# Patient Record
Sex: FEMALE | HISPANIC OR LATINO | Employment: FULL TIME | URBAN - METROPOLITAN AREA
[De-identification: names, ages, dates, MRNs, and addresses within clinical notes are randomized per-mention and may not be internally consistent; named-entity substitution may affect disease eponyms.]

---

## 2017-04-14 ENCOUNTER — OFFICE VISIT (OUTPATIENT)
Dept: URGENT CARE | Facility: PHYSICIAN GROUP | Age: 22
End: 2017-04-14
Payer: COMMERCIAL

## 2017-04-14 VITALS
DIASTOLIC BLOOD PRESSURE: 74 MMHG | TEMPERATURE: 98.5 F | WEIGHT: 210 LBS | HEART RATE: 88 BPM | BODY MASS INDEX: 30.06 KG/M2 | OXYGEN SATURATION: 96 % | RESPIRATION RATE: 18 BRPM | HEIGHT: 70 IN | SYSTOLIC BLOOD PRESSURE: 116 MMHG

## 2017-04-14 DIAGNOSIS — L29.9 PRURITUS: ICD-10-CM
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
## 2017-04-14 PROCEDURE — 99214 OFFICE O/P EST MOD 30 MIN: CPT | Performed by: PHYSICIAN ASSISTANT

## 2017-04-14 RX ORDER — HYDROXYZINE HYDROCHLORIDE 25 MG/1
25 TABLET, FILM COATED ORAL 3 TIMES DAILY PRN
Qty: 30 TAB | Refills: 0 | Status: SHIPPED | OUTPATIENT
Start: 2017-04-14 | End: 2017-05-22

## 2017-04-14 RX ORDER — METHYLPREDNISOLONE 4 MG/1
TABLET ORAL
Qty: 1 KIT | Refills: 0 | Status: SHIPPED | OUTPATIENT
Start: 2017-04-14 | End: 2017-05-22

## 2017-04-14 RX ORDER — TRIAMCINOLONE ACETONIDE 1 MG/ML
LOTION TOPICAL
Qty: 1 BOTTLE | Refills: 1 | Status: SHIPPED | OUTPATIENT
Start: 2017-04-14 | End: 2017-05-22

## 2017-04-14 NOTE — MR AVS SNAPSHOT
"        Mercy Almeida   2017 3:30 PM   Office Visit   MRN: [id number]    Department:  [facility]   Dept Phone:  [phone]    Description:  Female : 1995   Provider:  Lisa Terry PA-C           Reason for Visit     Insect Bite poss bed bugs, feet swelling      Allergies as of 2017     No Known Allergies      You were diagnosed with     Pruritus   [[id number]]         Vital Signs     Blood Pressure Pulse Temperature Respirations Height Weight    116/74 mmHg 88 36.9 °C (98.5 °F) 18 1.778 m (5' 10\") 95.255 kg (210 lb)    Body Mass Index Oxygen Saturation Smoking Status             30.13 kg/m2 96% Never Smoker          Basic Information     Date Of Birth Sex Race Ethnicity Preferred Language    1995 Female  or   Origin (Vincentian,Rwandan,Nigerian,John, etc) English      Health Maintenance        Date Due Completion Dates    IMM HEP A VACCINE (1 of 2 - Standard Series) 1995 ---    IMM HPV VACCINE (1 of 3 - Female 3 Dose Series) 2005 ---    IMM VARICELLA (CHICKENPOX) VACCINE (1 of 2 - 2 Dose Adolescent Series) 2007 ---    IMM DTaP/Tdap/Td Vaccine (1 - Tdap) 2013 ---    PAP SMEAR 2015 ---    IMM HEP B VACCINE (2 of 3 - Primary Series) 2015            Current Immunizations     Hepatitis B Vaccine Recombivax (Adol/Adult) 2015    Influenza Vaccine Quad Inj (Preserved) 10/7/2015    MMR Vaccine 10/7/2015      Below and/or attached are the medications your provider expects you to take. Review all of your home medications and newly ordered medications with your provider and/or pharmacist. Follow medication instructions as directed by your provider and/or pharmacist. Please keep your medication list with you and share with your provider. Update the information when medications are discontinued, doses are changed, or new medications (including over-the-counter products) are added; and carry medication information at " all times in the event of emergency situations     Allergies:  No Known Allergies          Medications  Valid as of: April 14, 2017 -  4:37 PM    Generic Name Brand Name Tablet Size Instructions for use    Amoxicillin (Cap) AMOXIL 500 MG Take 1 Cap by mouth 3 times a day.        Azithromycin (Tab) ZITHROMAX 250 MG Take 2 tabs by mouth on day 1, then take 1 tab on days 2-5        Cholecalciferol (Tab) cholecalciferol 1000 UNIT Take 1,000 Units by mouth every day.        Clindamycin Phosphate (Gel) CLEOCIN T 1 % Apply a small amount twice daily.        HydrOXYzine HCl (Tab) ATARAX 25 MG Take 1 Tab by mouth 3 times a day as needed for Itching.        Ibuprofen (Cap) Ibuprofen 200 MG Take  by mouth.        MethylPREDNISolone (Tablet Therapy Pack) MEDROL DOSEPAK 4 MG Take as directed        MetroNIDAZOLE (Tab) FLAGYL 500 MG Take 500 mg by mouth 3 times a day.        Triamcinolone Acetonide (Lotion) KENALOG 0.1 % Apply thin film to affected areas twice daily until resolved        Vitamin E (Cap) VITAMIN E 1000 UNIT Take 5 Caps by mouth every day.        .                 Medicines prescribed today were sent to:     [facility] [address] [place] [address] - [address] [place] [address] AT [facility] & [address] [address]    Phone: [phone] Fax: [phone]    Open 24 Hours?: No      Medication refill instructions:       If your prescription bottle indicates you have medication refills left, it is not necessary to call your provider’s office. Please contact your pharmacy and they will refill your medication.    If your prescription bottle indicates you do not have any refills left, you may request refills at any time through one of the following ways: The online [facility] system (except Urgent Care), by calling your provider’s office, or by asking your pharmacy to contact your provider’s office with a refill request. Medication refills are processed only during regular business hours and may not be  available until the next business day. Your provider may request additional information or to have a follow-up visit with you prior to refilling your medication.   *Please Note: Medication refills are assigned a new Rx number when refilled electronically. Your pharmacy may indicate that no refills were authorized even though a new prescription for the same medication is available at the pharmacy. Please request the medicine by name with the pharmacy before contacting your provider for a refill.           [employer] [facility] Access Code: Activation code not generated  Current [facility] Status: Active

## 2017-04-18 ASSESSMENT — ENCOUNTER SYMPTOMS
MUSCULOSKELETAL NEGATIVE: 1
FEVER: 0
DIZZINESS: 0
HEADACHES: 0
VOMITING: 0
SHORTNESS OF BREATH: 0
NAUSEA: 0
CHILLS: 0

## 2017-04-18 NOTE — PROGRESS NOTES
"Subjective:      Mercy Almeida is a 21 y.o. female who presents with Insect Bite            HPI   Patient presents to urgent care concerned about bites on her body that she believes are due to bed bugs. She states her and her mother recently took a trip to [place], and she started developing small bumps all over her body after the first night staying in a new hotel. She denies any previous similar episode like this. States the areas are extremely itchy. Denies any history of skin problems. Her mother does not have similar symptoms, and she does report sleeping in the same bed as her. They just returned home from their trip yesterday and she did notice a bug in her home yesterday.     Review of Systems   Constitutional: Negative for fever and chills.   Respiratory: Negative for shortness of breath.    Cardiovascular: Negative for chest pain.   Gastrointestinal: Negative for nausea and vomiting.   Genitourinary: Negative.    Musculoskeletal: Negative.    Skin: Positive for itching and rash.   Neurological: Negative for dizziness and headaches.          Objective:     /74 mmHg  Pulse 88  Temp(Src) 36.9 °C (98.5 °F)  Resp 18  Ht 1.778 m (5' 10\")  Wt 95.255 kg (210 lb)  BMI 30.13 kg/m2  SpO2 96%     Physical Exam   Constitutional: She is oriented to person, place, and time. She appears well-developed and well-nourished. No distress.   HENT:   Head: Normocephalic and atraumatic.   Eyes: Pupils are equal, round, and reactive to light.   Neck: Normal range of motion.   Cardiovascular: Normal rate.    Pulmonary/Chest: Effort normal.   Musculoskeletal: Normal range of motion.   Neurological: She is alert and oriented to person, place, and time.   Skin: Skin is warm and dry. Rash noted. She is not diaphoretic.        Multiple small, punctate lesions that have scabbed over with large areas of surrounding erythema and edema consistent with urticaria.    Psychiatric: She has a normal mood and affect. Her " behavior is normal.   Nursing note and vitals reviewed.         PMH:  has no past medical history on file.  MEDS:   Current outpatient prescriptions:   •  MethylPREDNISolone (MEDROL DOSEPAK) 4 MG Tablet Therapy Pack, Take as directed, Disp: 1 Kit, Rfl: 0  •  triamcinolone (KENALOG) 0.1 % lotion, Apply thin film to affected areas twice daily until resolved, Disp: 1 Bottle, Rfl: 1  •  hydrOXYzine (ATARAX) 25 MG Tab, Take 1 Tab by mouth 3 times a day as needed for Itching., Disp: 30 Tab, Rfl: 0  •  vitamin D (CHOLECALCIFEROL) 1000 UNIT Tab, Take 1,000 Units by mouth every day., Disp: , Rfl:   •  amoxicillin (AMOXIL) 500 MG Cap, Take 1 Cap by mouth 3 times a day., Disp: 30 Cap, Rfl: 0  •  vitamin E (VITAMIN E) 1000 UNIT Cap, Take 5 Caps by mouth every day., Disp: , Rfl:   •  azithromycin (ZITHROMAX) 250 MG Tab, Take 2 tabs by mouth on day 1, then take 1 tab on days 2-5, Disp: 6 Tab, Rfl: 0  •  metronidazole (FLAGYL) 500 MG TABS, Take 500 mg by mouth 3 times a day., Disp: , Rfl:   •  clindamycin (CLEOCIN T) 1 % GEL, Apply a small amount twice daily., Disp: 1 Tube, Rfl: 0  •  Ibuprofen (ADVIL) 200 MG CAPS, Take  by mouth., Disp: , Rfl:   ALLERGIES: No Known Allergies  SURGHX: History reviewed. No pertinent past surgical history.  SOCHX:  reports that she has never smoked. She does not have any smokeless tobacco history on file. She reports that she does not drink alcohol or use illicit drugs.  FH: family history is not on file.       Assessment/Plan:     1. Multiple insect bites  - MethylPREDNISolone (MEDROL DOSEPAK) 4 MG Tablet Therapy Pack; Take as directed  Dispense: 1 Kit; Refill: 0  - triamcinolone (KENALOG) 0.1 % lotion; Apply thin film to affected areas twice daily until resolved  Dispense: 1 Bottle; Refill: 1    2. Pruritus  - hydrOXYzine (ATARAX) 25 MG Tab; Take 1 Tab by mouth 3 times a day as needed for Itching.  Dispense: 30 Tab; Refill: 0   - Will cause sedation, avoid driving, operating heavy machinery, and  drinking alcohol    Advised patient her lesions resemble bed bug bites. Will treat with medrol dose pack due to large areas or urticarial rashes underlying lesions. Advised to spot treat with triamcinolone cream twice daily until resolved. Atarax given for pruritis. Patient advised that she wash all clothing and bedding at her house in very hot water and contact a  for inspection of her home. Call or return to office if symptoms of infection develop.

## 2017-05-22 ENCOUNTER — OFFICE VISIT (OUTPATIENT)
Dept: URGENT CARE | Facility: CLINIC | Age: 22
End: 2017-05-22
Payer: COMMERCIAL

## 2017-05-22 VITALS
SYSTOLIC BLOOD PRESSURE: 118 MMHG | BODY MASS INDEX: 30.49 KG/M2 | RESPIRATION RATE: 16 BRPM | WEIGHT: 213 LBS | DIASTOLIC BLOOD PRESSURE: 78 MMHG | TEMPERATURE: 98.4 F | OXYGEN SATURATION: 96 % | HEIGHT: 70 IN | HEART RATE: 81 BPM

## 2017-05-22 DIAGNOSIS — H10.33 ACUTE BACTERIAL CONJUNCTIVITIS OF BOTH EYES: ICD-10-CM

## 2017-05-22 DIAGNOSIS — J01.90 ACUTE BACTERIAL SINUSITIS: ICD-10-CM

## 2017-05-22 DIAGNOSIS — J02.9 ACUTE PHARYNGITIS, UNSPECIFIED ETIOLOGY: ICD-10-CM

## 2017-05-22 DIAGNOSIS — B96.89 ACUTE BACTERIAL SINUSITIS: ICD-10-CM

## 2017-05-22 LAB
INT CON NEG: NEGATIVE
INT CON POS: POSITIVE
S PYO AG THROAT QL: NEGATIVE

## 2017-05-22 PROCEDURE — 99214 OFFICE O/P EST MOD 30 MIN: CPT | Performed by: FAMILY MEDICINE

## 2017-05-22 PROCEDURE — 87880 STREP A ASSAY W/OPTIC: CPT | Performed by: FAMILY MEDICINE

## 2017-05-22 RX ORDER — AMOXICILLIN 875 MG/1
TABLET, COATED ORAL
Qty: 20 TAB | Refills: 0 | Status: SHIPPED | OUTPATIENT
Start: 2017-05-22 | End: 2018-01-23

## 2017-05-22 RX ORDER — OFLOXACIN 3 MG/ML
SOLUTION/ DROPS OPHTHALMIC
Qty: 5 ML | Refills: 1 | Status: CANCELLED | OUTPATIENT
Start: 2017-05-22

## 2017-05-22 RX ORDER — OFLOXACIN 3 MG/ML
SOLUTION/ DROPS OPHTHALMIC
Qty: 5 ML | Refills: 1 | Status: SHIPPED | OUTPATIENT
Start: 2017-05-22 | End: 2017-05-30

## 2017-05-22 NOTE — PROGRESS NOTES
Chief Complaint:    Chief Complaint   Patient presents with   • Eye Problem     x since yesterday having red, itchy, L eye, R eye started today, heavy discharge    • Nasal Congestion     x started having nasal congestion and drainage, dry cough, irritated throat       History of Present Illness:    This is a new problem. Started with sore throat on 5/20/17. On 5/21/17, started with both eyes red and with purulent discharge, nasal congestion, and cough (mainly when lays down - thinks may be due to post-nasal drainage). No fever. No contact lens use, foreign body to eye, or significant change in vision.      Review of Systems:    Constitutional: Negative for fever, chills, and diaphoresis.   Eyes: See HPI.  ENT: See HPI..    Respiratory: See HPI.  Cardiovascular: Negative for chest pain, palpitations, orthopnea, claudication, leg swelling, and PND.   Gastrointestinal: Negative for abdominal pain, nausea, vomiting, diarrhea, constipation, blood in stool, and melena.   Genitourinary: Negative for dysuria, urinary urgency, urinary frequency, hematuria, and flank pain.   Musculoskeletal: Negative for myalgias, joint pain, neck pain, and back pain.   Skin: Negative for rash and itching.   Neurological: Negative for dizziness, tingling, tremors, sensory change, speech change, focal weakness, seizures, loss of consciousness, and headaches.   Endo: Negative for polydipsia.   Heme: Does not bruise/bleed easily.   Psychiatric/Behavioral: Negative for depression, suicidal ideas, hallucinations, memory loss and substance abuse. The patient is not nervous/anxious and does not have insomnia.      Past Medical History:    History reviewed. No pertinent past medical history.    Past Surgical History:    History reviewed. No pertinent past surgical history.    Social History:    Social History     Social History   • Marital Status: Single     Spouse Name: N/A   • Number of Children: N/A   • Years of Education: N/A     Occupational  "History   • Not on file.     Social History Main Topics   • Smoking status: Never Smoker    • Smokeless tobacco: Not on file   • Alcohol Use: No   • Drug Use: No   • Sexual Activity: Not on file     Other Topics Concern   • Not on file     Social History Narrative       Family History:    History reviewed. No pertinent family history.    Medications:    No current outpatient prescriptions on file prior to visit.     No current facility-administered medications on file prior to visit.       Allergies:    No Known Allergies      Vitals:    Filed Vitals:    05/22/17 0850   BP: 118/78   Pulse: 81   Temp: 36.9 °C (98.4 °F)   Resp: 16   Height: 1.778 m (5' 10\")   Weight: 96.616 kg (213 lb)   SpO2: 96%       Physical Exam:    Constitutional: Vital signs reviewed. Appears well-developed and well-nourished. No acute distress.   Eyes: Bilateral conjunctivae are moderate-severely injected with dried purulent discharge at medial canthi bilaterally. PERRLA.  ENT: Bilateral nasal congestion and erythematous nasal mucosa. External ears normal. Hearing normal. Nasal mucosa pink. Lips/teeth are normal. Oral mucosa pink and moist. Posterior pharynx and tonsils are moderately erythematous without exudate.  Neck: Neck supple.   Cardiovascular: Regular rate and rhythm. No murmur.  Pulmonary/Chest: Respirations non-labored. Clear to auscultation bilaterally.  Lymph: Cervical nodes without tenderness or enlargement.  Musculoskeletal: Normal gait. Normal range of motion. No muscular atrophy or weakness.  Neurological: Alert and oriented to person, place, and time. Muscle tone normal. Coordination normal.   Skin: No rashes or lesions. Warm, dry, normal turgor.  Psychiatric: Normal mood and affect. Behavior is normal. Judgment and thought content normal.     Diagnostics:     POCT RAPID STREP A (Order #[id number]) on 5/22/17       Component Results      Component     Rapid Strep Screen     Negative     Internal Control Positive     Positive "     Internal Control Negative     Negative         Last Resulted Time     Mon May 22, 2017  9:58 AM       Assessment / Plan:    1. Acute bacterial conjunctivitis of both eyes  - ofloxacin (OCUFLOX) 0.3 % Solution; 1-2 DROPS TO EYES EVERY 2-4 HOURS WHILE AWAKE. USE FOR ONE EXTRA DAY AFTER BETTER.  Dispense: 5 mL; Refill: 1    2. Acute pharyngitis, unspecified etiology  - POCT Rapid Strep A  - amoxicillin (AMOXIL) 875 MG tablet; 1 TAB TWICE A DAY X 10 DAYS.  Dispense: 20 Tab; Refill: 0    3. Acute bacterial sinusitis  - amoxicillin (AMOXIL) 875 MG tablet; 1 TAB TWICE A DAY X 10 DAYS.  Dispense: 20 Tab; Refill: 0      Work note given - excuse for 5/22 thru and including 5/24/17. May return sooner if better.    Discussed with her limitations of Rapid Strep test (possible false negative, only testing for Strep A), DDX, and management options.    May use OTC meds for symptoms prn.    Agreeable to medications prescribed.    Declines Throat culture.    Follow-up with PCP or urgent care if getting worse or not better with above.

## 2017-05-22 NOTE — MR AVS SNAPSHOT
"        Mercy Almeida   2017 8:45 AM   Office Visit   MRN: [id number]    Department:  [facility]   Dept Phone:  [phone]    Description:  Female : 1995   Provider:  Betito Subramanian M.D.           Reason for Visit     Eye Problem x since yesterday having red, itchy, L eye, R eye started today, heavy discharge     Nasal Congestion x started having nasal congestion and drainage, dry cough, irritated throat      Allergies as of 2017     No Known Allergies      You were diagnosed with     Acute bacterial conjunctivitis of both eyes   [[id number]]       Acute pharyngitis, unspecified etiology   [[id number]]       Acute bacterial sinusitis   [[id number]]         Vital Signs     Blood Pressure Pulse Temperature Respirations Height Weight    118/78 mmHg 81 36.9 °C (98.4 °F) 16 1.778 m (5' 10\") 96.616 kg (213 lb)    Body Mass Index Oxygen Saturation Smoking Status             30.56 kg/m2 96% Never Smoker          Basic Information     Date Of Birth Sex Race Ethnicity Preferred Language    1995 Female  or   Origin (Northern Irish,Mosotho,Syrian,South African, etc) English      Health Maintenance        Date Due Completion Dates    IMM HEP A VACCINE (1 of 2 - Standard Series) 1995 ---    IMM HPV VACCINE (1 of 3 - Female 3 Dose Series) 2005 ---    IMM VARICELLA (CHICKENPOX) VACCINE (1 of 2 - 2 Dose Adolescent Series) 2007 ---    IMM DTaP/Tdap/Td Vaccine (1 - Tdap) 2013 ---    PAP SMEAR 2015 ---    IMM HEP B VACCINE (2 of 3 - Primary Series) 2015            Current Immunizations     Hepatitis B Vaccine Recombivax (Adol/Adult) 2015    Influenza Vaccine Quad Inj (Preserved) 10/7/2015    MMR Vaccine 10/7/2015      Below and/or attached are the medications your provider expects you to take. Review all of your home medications and newly ordered medications with your provider and/or pharmacist. Follow medication instructions as directed by " your provider and/or pharmacist. Please keep your medication list with you and share with your provider. Update the information when medications are discontinued, doses are changed, or new medications (including over-the-counter products) are added; and carry medication information at all times in the event of emergency situations     Allergies:  No Known Allergies          Medications  Valid as of: May 22, 2017 -  9:44 AM    Generic Name Brand Name Tablet Size Instructions for use    Amoxicillin (Tab) AMOXIL 875 MG 1 TAB TWICE A DAY X 10 DAYS.        Ofloxacin (Solution) OCUFLOX 0.3 % 1-2 DROPS TO EYES EVERY 2-4 HOURS WHILE AWAKE. USE FOR ONE EXTRA DAY AFTER BETTER.        .                 Medicines prescribed today were sent to:     [facility] [address] [place] [address], [place] - [address] [facility] AT [facility] & [place] [id number] [facility] [address]    Phone: [phone] Fax: [phone]    Open 24 Hours?: No      Medication refill instructions:       If your prescription bottle indicates you have medication refills left, it is not necessary to call your provider’s office. Please contact your pharmacy and they will refill your medication.    If your prescription bottle indicates you do not have any refills left, you may request refills at any time through one of the following ways: The online [employer] system (except Urgent Care), by calling your provider’s office, or by asking your pharmacy to contact your provider’s office with a refill request. Medication refills are processed only during regular business hours and may not be available until the next business day. Your provider may request additional information or to have a follow-up visit with you prior to refilling your medication.   *Please Note: Medication refills are assigned a new Rx number when refilled electronically. Your pharmacy may indicate that no refills were authorized even though a new prescription for the same medication is available  at the pharmacy. Please request the medicine by name with the pharmacy before contacting your provider for a refill.           MyChart Access Code: Activation code not generated  Current MyChart Status: Active

## 2017-05-22 NOTE — Clinical Note
May 22, 2017         Patient: Mercy Almeida   YOB: 1995   Date of Visit: 5/22/2017           To Whom it May Concern:    Mercy Almeida was seen in my clinic on 5/22/2017.     Please excuse from work for 5/22 thru and including 5/24/17 due to medical condition.    May return sooner if better.    If you have any questions or concerns, please don't hesitate to call.        Sincerely,           Betito Subramanian M.D.  Electronically Signed

## 2017-05-30 ENCOUNTER — OFFICE VISIT (OUTPATIENT)
Dept: URGENT CARE | Facility: PHYSICIAN GROUP | Age: 22
End: 2017-05-30
Payer: COMMERCIAL

## 2017-05-30 VITALS
SYSTOLIC BLOOD PRESSURE: 122 MMHG | TEMPERATURE: 97.9 F | DIASTOLIC BLOOD PRESSURE: 70 MMHG | OXYGEN SATURATION: 98 % | BODY MASS INDEX: 28.84 KG/M2 | RESPIRATION RATE: 16 BRPM | WEIGHT: 201 LBS | HEART RATE: 82 BPM

## 2017-05-30 DIAGNOSIS — H10.33 ACUTE BACTERIAL CONJUNCTIVITIS OF BOTH EYES: ICD-10-CM

## 2017-05-30 PROCEDURE — 99214 OFFICE O/P EST MOD 30 MIN: CPT | Performed by: FAMILY MEDICINE

## 2017-05-30 NOTE — Clinical Note
May 30, 2017         Patient: Mercy Almeida   YOB: 1995   Date of Visit: 5/30/2017           To Whom it May Concern:    Mrecy Almeida was seen in my clinic on 5/30/2017. She may return to work on 5/31/2017..    If you have any questions or concerns, please don't hesitate to call.        Sincerely,           Sky Murillo M.D.  Electronically Signed

## 2017-05-30 NOTE — PROGRESS NOTES
Subjective:      Mercy Almeida is a 22 y.o. female who presents with Conjunctivitis            Conjunctivitis  This is a new problem. The current episode started in the past 7 days. The problem occurs constantly. The problem has been rapidly worsening. Pertinent negatives include no chills, fever, headaches or visual change.       Review of Systems   Constitutional: Negative for fever and chills.   Neurological: Negative for headaches.     No Known Allergies      Objective:     /70 mmHg  Pulse 82  Temp(Src) 36.6 °C (97.9 °F)  Resp 16  Wt 91.173 kg (201 lb)  SpO2 98%     Physical Exam   Constitutional: She is oriented to person, place, and time. She appears well-developed and well-nourished. No distress.   HENT:   Head: Normocephalic and atraumatic.   Eyes: Conjunctivae and EOM are normal. Pupils are equal, round, and reactive to light. Right eye exhibits discharge. Left eye exhibits discharge. Right conjunctiva has no hemorrhage. Left conjunctiva has no hemorrhage.   Cardiovascular: Normal rate and regular rhythm.    No murmur heard.  Pulmonary/Chest: Effort normal and breath sounds normal. No respiratory distress.   Abdominal: Soft. She exhibits no distension. There is no tenderness.   Neurological: She is alert and oriented to person, place, and time. She has normal reflexes. No sensory deficit.   Skin: Skin is warm and dry.   Psychiatric: She has a normal mood and affect.               Assessment/Plan:     1. Acute bacterial conjunctivitis of both eyes  Differential diagnosis, natural history, supportive care, and indications for immediate follow-up discussed.   - azithromycin (AZASITE) 1 % ophthalmic solution; Place 1 Drop in both eyes every day.  Dispense: 2.5 mL; Refill: 0

## 2017-05-30 NOTE — PATIENT INSTRUCTIONS
"Conjunctivitis  Conjunctivitis is commonly called \"pink eye.\" Conjunctivitis can be caused by bacterial or viral infection, allergies, or injuries. There is usually redness of the lining of the eye, itching, discomfort, and sometimes discharge. There may be deposits of matter along the eyelids. A viral infection usually causes a watery discharge, while a bacterial infection causes a yellowish, thick discharge. Pink eye is very contagious and spreads by direct contact.  You may be given antibiotic eyedrops as part of your treatment. Before using your eye medicine, remove all drainage from the eye by washing gently with warm water and cotton balls. Continue to use the medication until you have awakened 2 mornings in a row without discharge from the eye. Do not rub your eye. This increases the irritation and helps spread infection. Use separate towels from other household members. Wash your hands with soap and water before and after touching your eyes. Use cold compresses to reduce pain and sunglasses to relieve irritation from light. Do not wear contact lenses or wear eye makeup until the infection is gone.  SEEK MEDICAL CARE IF:   · Your symptoms are not better after 3 days of treatment.  · You have increased pain or trouble seeing.  · The outer eyelids become very red or swollen.  Document Released: 01/25/2006 Document Revised: 03/11/2013 Document Reviewed: 12/18/2006  [email or web address]® Patient Information ©2014 [employer].    "

## 2017-05-30 NOTE — MR AVS SNAPSHOT
Mercy Almeida   2017 10:45 AM   Office Visit   MRN: [id number]    Department:  [facility]   Dept Phone:  [phone]    Description:  Female : 1995   Provider:  Sky Murillo M.D.           Reason for Visit     Conjunctivitis dx pink eye last wk, needs more meds due to loss of previous rx      Allergies as of 2017     No Known Allergies      You were diagnosed with     Acute bacterial conjunctivitis of both eyes   [[id number]]         Vital Signs     Blood Pressure Pulse Temperature Respirations Weight Oxygen Saturation    122/70 mmHg 82 36.6 °C (97.9 °F) 16 91.173 kg (201 lb) 98%    Smoking Status                   Never Smoker            Basic Information     Date Of Birth Sex Race Ethnicity Preferred Language    1995 Female  or   Origin (Icelandic,Slovenian,Singaporean,Serbian, etc) English      Health Maintenance        Date Due Completion Dates    IMM HEP A VACCINE (1 of 2 - Standard Series) 1995 ---    IMM HPV VACCINE (1 of 3 - Female 3 Dose Series) 2005 ---    IMM VARICELLA (CHICKENPOX) VACCINE (1 of 2 - 2 Dose Adolescent Series) 2007 ---    IMM DTaP/Tdap/Td Vaccine (1 - Tdap) 2013 ---    PAP SMEAR 2015 ---    IMM HEP B VACCINE (2 of 3 - Primary Series) 2015            Current Immunizations     Hepatitis B Vaccine Recombivax (Adol/Adult) 2015    Influenza Vaccine Quad Inj (Preserved) 10/7/2015    MMR Vaccine 10/7/2015      Below and/or attached are the medications your provider expects you to take. Review all of your home medications and newly ordered medications with your provider and/or pharmacist. Follow medication instructions as directed by your provider and/or pharmacist. Please keep your medication list with you and share with your provider. Update the information when medications are discontinued, doses are changed, or new medications (including over-the-counter products) are added; and  "carry medication information at all times in the event of emergency situations     Allergies:  No Known Allergies          Medications  Valid as of: May 30, 2017 - 12:03 PM    Generic Name Brand Name Tablet Size Instructions for use    Amoxicillin (Tab) AMOXIL 875 MG 1 TAB TWICE A DAY X 10 DAYS.        Azithromycin (Solution) AZASITE 1 % Place 1 Drop in both eyes every day.        .                 Medicines prescribed today were sent to:     [facility] [id number] [place] [address], [address] [facility] [address]    Phone: [phone] Fax: [phone]    Open 24 Hours?: No      Medication refill instructions:       If your prescription bottle indicates you have medication refills left, it is not necessary to call your provider’s office. Please contact your pharmacy and they will refill your medication.    If your prescription bottle indicates you do not have any refills left, you may request refills at any time through one of the following ways: The online Reglare system (except Urgent Care), by calling your provider’s office, or by asking your pharmacy to contact your provider’s office with a refill request. Medication refills are processed only during regular business hours and may not be available until the next business day. Your provider may request additional information or to have a follow-up visit with you prior to refilling your medication.   *Please Note: Medication refills are assigned a new Rx number when refilled electronically. Your pharmacy may indicate that no refills were authorized even though a new prescription for the same medication is available at the pharmacy. Please request the medicine by name with the pharmacy before contacting your provider for a refill.        Instructions    Conjunctivitis  Conjunctivitis is commonly called \"pink eye.\" Conjunctivitis can be caused by bacterial or viral infection, allergies, or injuries. " There is usually redness of the lining of the eye, itching, discomfort, and sometimes discharge. There may be deposits of matter along the eyelids. A viral infection usually causes a watery discharge, while a bacterial infection causes a yellowish, thick discharge. Pink eye is very contagious and spreads by direct contact.  You may be given antibiotic eyedrops as part of your treatment. Before using your eye medicine, remove all drainage from the eye by washing gently with warm water and cotton balls. Continue to use the medication until you have awakened 2 mornings in a row without discharge from the eye. Do not rub your eye. This increases the irritation and helps spread infection. Use separate towels from other household members. Wash your hands with soap and water before and after touching your eyes. Use cold compresses to reduce pain and sunglasses to relieve irritation from light. Do not wear contact lenses or wear eye makeup until the infection is gone.  SEEK MEDICAL CARE IF:   · Your symptoms are not better after 3 days of treatment.  · You have increased pain or trouble seeing.  · The outer eyelids become very red or swollen.  Document Released: 01/25/2006 Document Revised: 03/11/2013 Document Reviewed: 12/18/2006  ExitCare® Patient Information ©2014 [employer].            [employer]hart Access Code: Activation code not generated  Current [facility] Medical Status: Active

## 2017-06-12 ASSESSMENT — ENCOUNTER SYMPTOMS
FEVER: 0
HEADACHES: 0
VISUAL CHANGE: 0
CHILLS: 0

## 2017-11-28 ENCOUNTER — HOSPITAL ENCOUNTER (OUTPATIENT)
Facility: MEDICAL CENTER | Age: 22
End: 2017-11-28
Attending: FAMILY MEDICINE
Payer: COMMERCIAL

## 2017-11-28 PROCEDURE — 88175 CYTOPATH C/V AUTO FLUID REDO: CPT

## 2017-11-29 LAB — CYTOLOGY REG CYTOL: NORMAL

## 2018-01-23 ENCOUNTER — OFFICE VISIT (OUTPATIENT)
Dept: URGENT CARE | Facility: CLINIC | Age: 23
End: 2018-01-23
Payer: COMMERCIAL

## 2018-01-23 VITALS
SYSTOLIC BLOOD PRESSURE: 112 MMHG | WEIGHT: 215 LBS | DIASTOLIC BLOOD PRESSURE: 80 MMHG | BODY MASS INDEX: 30.78 KG/M2 | HEART RATE: 75 BPM | TEMPERATURE: 98.5 F | HEIGHT: 70 IN | OXYGEN SATURATION: 97 % | RESPIRATION RATE: 16 BRPM

## 2018-01-23 DIAGNOSIS — J02.9 SORE THROAT: ICD-10-CM

## 2018-01-23 DIAGNOSIS — J02.0 ACUTE STREPTOCOCCAL PHARYNGITIS: ICD-10-CM

## 2018-01-23 LAB
INT CON NEG: NEGATIVE
INT CON POS: POSITIVE
S PYO AG THROAT QL: POSITIVE

## 2018-01-23 PROCEDURE — 87880 STREP A ASSAY W/OPTIC: CPT | Performed by: NURSE PRACTITIONER

## 2018-01-23 PROCEDURE — 99214 OFFICE O/P EST MOD 30 MIN: CPT | Performed by: NURSE PRACTITIONER

## 2018-01-23 RX ORDER — PENICILLIN V POTASSIUM 500 MG/1
500 TABLET ORAL 3 TIMES DAILY
Qty: 30 TAB | Refills: 0 | Status: SHIPPED | OUTPATIENT
Start: 2018-01-23 | End: 2018-02-02

## 2018-01-23 NOTE — LETTER
January 23, 2018         Patient: Mercy Almeida   YOB: 1995   Date of Visit: 1/23/2018           To Whom it May Concern:    Mercy Almeida was seen in my clinic on 1/23/2018. She may be excused from work today and tomorrow.     If you have any questions or concerns, please don't hesitate to call.        Sincerely,           JUNI Walker.  Electronically Signed

## 2018-01-23 NOTE — NON-PROVIDER
Chief Complaint   Patient presents with   • Pharyngitis     3 days   • Chills     last nigh       HISTORY OF PRESENT ILLNESS: Patient is a 22 y.o. female who presents today due to symptoms that started 3 days ago. Reports moderate to severe sore throat, nasal congestion, sinus pressure, subjective fevers and chills, with generalized body aches. Denies chest pain, shortness of breath, or wheezing. Denies h/o asthma/copd/CAP. No immunocompromise. Has tried OTC cold medications without significant relief of symptoms. No recent ABX use. No other aggravating or alleviating factors.     There are no active problems to display for this patient.      Allergies:Patient has no known allergies.    Current Outpatient Prescriptions Ordered in [facility]   Medication Sig Dispense Refill   • penicillin v potassium (VEETID) 500 MG Tab Take 1 Tab by mouth 3 times a day for 10 days. 30 Tab 0   • maalox plus-benadryl-visc lidocaine (MAGIC MOUTHWASH) Take 5 mL by mouth every 6 hours as needed. 100 mL 0     No current Epic-ordered facility-administered medications on file.        No past medical history on file.    Social History   Substance Use Topics   • Smoking status: Never Smoker   • Smokeless tobacco: Never Used   • Alcohol use No       No family status information on file.   No family history on file.    ROS:  Review of Systems   Constitutional: Positive for subjective fever, chills, fatigue. Negative for weight loss and malaise.  HENT: Positive for congestion and sore throat. Negative for ear pain, nosebleeds, and neck pain.    Eyes: Negative for vision changes.   Cardiovascular: Negative for chest pain, palpitations, orthopnea and leg swelling.   Respiratory: Negative for cough and sputum production. Negative for shortness of breath and wheezing.   Gastrointestinal: Negative for abdominal pain, nausea, vomiting or diarrhea.   Skin: Negative for rash, diaphoresis.     Exam:  Blood pressure 112/80, pulse 75, temperature 36.9 °C (98.5  "°F), resp. rate 16, height 1.778 m (5' 10\"), weight 97.5 kg (215 lb), SpO2 97 %.  General: well-nourished, well-developed female in NAD  Head: normocephalic, atraumatic  Eyes: PERRLA, EOM within normal limits, no conjunctival injection, no scleral icterus, visual fields and acuity grossly intact.  Ears: normal shape and symmetry, no tenderness, no discharge. External canals are without any significant edema or erythema. Tympanic membranes are without any inflammation, no effusion. Gross auditory acuity is intact.  Nose: symmetrical without tenderness, mild discharge, erythema present bilateral nares.  Mouth/Throat: reasonable hygiene, tonsillar enlargement +3 with erythema present. No exudate.   Neck: no masses, range of motion within normal limits, no tracheal deviation.  Lymph: No cervical adenopathy. No supraclavicular adenopathy. Mild submandibular adenopathy  Neuro: alert and oriented. Cranial nerves 1-12 grossly intact.   Cardiovascular: regular rate and rhythm without murmurs, rubs, or gallops. No edema.   Pulmonary: no distress. Chest is symmetrical with respiration, no wheezes, crackles, or rhonchi.   Musculoskeletal: appropriate muscle tone, gait is stable.  Skin: warm, dry, intact, no clubbing, no cyanosis.   Psych: appropriate mood, affect, judgement.         Assessment/Plan:  1. Acute streptococcal pharyngitis  POCT Rapid Strep A    penicillin v potassium (VEETID) 500 MG Tab   2. Sore throat  maalox plus-benadryl-visc lidocaine (MAGIC MOUTHWASH)         Patient reports having sore throat x 3 days with associated cold symptoms upon onset. Reports having recent travel to [place] denies having recent sick contact.   Discussed symptoms most likely bacterial, prescribed pen v k for 10 days with magic mouth wash for pain and discomfort. Discussed natural progression and sx care. With OTC ibuprofen and or tylenol for fevers and or pain  Rest, increase fluids, hand and respiratory hygiene.  Offered Flu " vaccination patient declines at this time.    Honey for cough. Supportive care, differential diagnoses, and indications for immediate follow-up discussed with patient. Pathogenesis of diagnosis discussed including typical length and natural progression.  Instructed to return to clinic or nearest emergency department for any change in condition, further concerns, or worsening of symptoms.  Patient states understanding of the plan of care and discharge instructions.  Instructed to make an appointment with their primary care provider in the next 3-7 days if not significantly improving and for further care.         Please note that this dictation was created using voice recognition software. I have made every reasonable attempt to correct obvious errors, but I expect that there are errors of grammar and possibly content that I did not discover before finalizing the note.      Jimmy Rosenbaum RN, BSN, A.P.R.N.-Student

## 2018-01-23 NOTE — PROGRESS NOTES
Chart reviewed. I personally have evaluated patient and agree with student Jimmy Rosenbaum's evaluation, assessment and treatment plan.       Amara Mahan, APRN

## 2018-02-07 ENCOUNTER — OFFICE VISIT (OUTPATIENT)
Dept: URGENT CARE | Facility: PHYSICIAN GROUP | Age: 23
End: 2018-02-07
Payer: COMMERCIAL

## 2018-02-07 VITALS
OXYGEN SATURATION: 100 % | RESPIRATION RATE: 16 BRPM | TEMPERATURE: 97.5 F | DIASTOLIC BLOOD PRESSURE: 84 MMHG | HEART RATE: 84 BPM | HEIGHT: 70 IN | SYSTOLIC BLOOD PRESSURE: 114 MMHG | WEIGHT: 214 LBS | BODY MASS INDEX: 30.64 KG/M2

## 2018-02-07 DIAGNOSIS — J02.0 STREP THROAT: ICD-10-CM

## 2018-02-07 LAB
INT CON NEG: NEGATIVE
INT CON POS: POSITIVE
S PYO AG THROAT QL: NORMAL

## 2018-02-07 PROCEDURE — 99214 OFFICE O/P EST MOD 30 MIN: CPT | Performed by: PHYSICIAN ASSISTANT

## 2018-02-07 PROCEDURE — 87880 STREP A ASSAY W/OPTIC: CPT | Performed by: PHYSICIAN ASSISTANT

## 2018-02-07 RX ORDER — DEXAMETHASONE SODIUM PHOSPHATE 10 MG/ML
10 INJECTION INTRAMUSCULAR; INTRAVENOUS ONCE
Status: COMPLETED | OUTPATIENT
Start: 2018-02-07 | End: 2018-02-07

## 2018-02-07 RX ORDER — CEPHALEXIN 500 MG/1
500 CAPSULE ORAL 2 TIMES DAILY
Qty: 20 CAP | Refills: 0 | Status: SHIPPED | OUTPATIENT
Start: 2018-02-07 | End: 2018-02-17

## 2018-02-07 RX ADMIN — DEXAMETHASONE SODIUM PHOSPHATE 10 MG: 10 INJECTION INTRAMUSCULAR; INTRAVENOUS at 15:51

## 2018-02-07 ASSESSMENT — ENCOUNTER SYMPTOMS
TROUBLE SWALLOWING: 1
HOARSE VOICE: 0
SHORTNESS OF BREATH: 0
NECK PAIN: 0
COUGH: 0
DIARRHEA: 0
SWOLLEN GLANDS: 1
STRIDOR: 0

## 2018-02-07 NOTE — PROGRESS NOTES
"Subjective:      Mercy Almeida is a 22 y.o. female who presents with Sore Throat (x2 weeks, finished antibiotics, feels like symptoms are worse)            Pharyngitis    This is a new problem. The current episode started yesterday. The problem has been rapidly worsening. The maximum temperature recorded prior to her arrival was 100.4 - 100.9 F. The pain is at a severity of 6/10. The pain is moderate. Associated symptoms include swollen glands and trouble swallowing. Pertinent negatives include no coughing, diarrhea, drooling, hoarse voice, plugged ear sensation, neck pain, shortness of breath or stridor. She has had exposure to strep. She has tried nothing for the symptoms.     Past medical history: Is not pertinent to this examination  Past surgical history: Not pertinent to this examination  Family history: Is not pertinent to this examination  Allergies: No known drug allergies  Social history: Is reviewed in Epic      Review of Systems   HENT: Positive for trouble swallowing. Negative for drooling and hoarse voice.    Respiratory: Negative for cough, shortness of breath and stridor.    Gastrointestinal: Negative for diarrhea.   Musculoskeletal: Negative for neck pain.          Objective:     /84   Pulse 84   Temp 36.4 °C (97.5 °F)   Resp 16   Ht 1.778 m (5' 10\")   Wt 97.1 kg (214 lb)   SpO2 100%   Breastfeeding? No   BMI 30.71 kg/m²      Physical Exam       Gen.: Patient is A and O ×3, no acute distress, well-nourished well-hydrated  Vitals: Are listed and unremarkable  HEENT: Heads normocephalic, atraumatic, PERRLA, extraocular movements intact, oropharynx is 3+ enlarged tonsils with bilateral exudate. No uvular deviation  Neck: Soft supple anterior cervical lymphadenopathy  Cardiovascular: Regular rate and rhythm normal S1 and S2. No murmurs, rubs or gallops  Lungs are clear to auscultation bilaterally. no wheezes rales or rhonchi  Abdomen is soft, nontender, nondistended with good " bowel sounds, no hepatosplenomegaly  Skin: Is well perfused without evidence of rash or lesions  Neurological:  cranial nerves II through XII were assessed and intact.  Musculoskeletal: Full range of motion, 5 out of 5 strength against resistance  Neurovascularly: Intact with a 2 second cap refill, good distal pulses   urgent care course rapid strep was positive  Assessment/Plan:     1. Strep throat  Supportive care measures also encouraged. Throughout your toothbrush and get a new one. Patient was on penicillin a few weeks ago. Therefore we'll use Keflex today.    - dexamethasone (DECADRON) injection (check route below) 10 mg; 1 mL by Intramuscular route Once.  - cephALEXin (KEFLEX) 500 MG Cap; Take 1 Cap by mouth 2 times a day for 10 days.  Dispense: 20 Cap; Refill: 0

## 2018-02-22 ENCOUNTER — HOSPITAL ENCOUNTER (OUTPATIENT)
Dept: LAB | Facility: MEDICAL CENTER | Age: 23
End: 2018-02-22
Attending: FAMILY MEDICINE
Payer: COMMERCIAL

## 2018-02-22 LAB
25(OH)D3 SERPL-MCNC: 14 NG/ML (ref 30–100)
ALBUMIN SERPL BCP-MCNC: 4.1 G/DL (ref 3.2–4.9)
ALBUMIN/GLOB SERPL: 1.3 G/DL
ALP SERPL-CCNC: 65 U/L (ref 30–99)
ALT SERPL-CCNC: 9 U/L (ref 2–50)
ANION GAP SERPL CALC-SCNC: 8 MMOL/L (ref 0–11.9)
AST SERPL-CCNC: 12 U/L (ref 12–45)
BASOPHILS # BLD AUTO: 0.4 % (ref 0–1.8)
BASOPHILS # BLD: 0.03 K/UL (ref 0–0.12)
BILIRUB SERPL-MCNC: 0.6 MG/DL (ref 0.1–1.5)
BUN SERPL-MCNC: 20 MG/DL (ref 8–22)
CALCIUM SERPL-MCNC: 9.1 MG/DL (ref 8.5–10.5)
CHLORIDE SERPL-SCNC: 108 MMOL/L (ref 96–112)
CHOLEST SERPL-MCNC: 144 MG/DL (ref 100–199)
CO2 SERPL-SCNC: 24 MMOL/L (ref 20–33)
CREAT SERPL-MCNC: 0.82 MG/DL (ref 0.5–1.4)
EOSINOPHIL # BLD AUTO: 0.19 K/UL (ref 0–0.51)
EOSINOPHIL NFR BLD: 2.6 % (ref 0–6.9)
ERYTHROCYTE [DISTWIDTH] IN BLOOD BY AUTOMATED COUNT: 41.6 FL (ref 35.9–50)
GLOBULIN SER CALC-MCNC: 3.2 G/DL (ref 1.9–3.5)
GLUCOSE SERPL-MCNC: 95 MG/DL (ref 65–99)
HCT VFR BLD AUTO: 46.1 % (ref 37–47)
HDLC SERPL-MCNC: 53 MG/DL
HGB BLD-MCNC: 15.1 G/DL (ref 12–16)
IMM GRANULOCYTES # BLD AUTO: 0.02 K/UL (ref 0–0.11)
IMM GRANULOCYTES NFR BLD AUTO: 0.3 % (ref 0–0.9)
LDLC SERPL CALC-MCNC: 84 MG/DL
LYMPHOCYTES # BLD AUTO: 2.54 K/UL (ref 1–4.8)
LYMPHOCYTES NFR BLD: 35.4 % (ref 22–41)
MCH RBC QN AUTO: 27.3 PG (ref 27–33)
MCHC RBC AUTO-ENTMCNC: 32.8 G/DL (ref 33.6–35)
MCV RBC AUTO: 83.4 FL (ref 81.4–97.8)
MONOCYTES # BLD AUTO: 0.33 K/UL (ref 0–0.85)
MONOCYTES NFR BLD AUTO: 4.6 % (ref 0–13.4)
NEUTROPHILS # BLD AUTO: 4.07 K/UL (ref 2–7.15)
NEUTROPHILS NFR BLD: 56.7 % (ref 44–72)
NRBC # BLD AUTO: 0 K/UL
NRBC BLD-RTO: 0 /100 WBC
PLATELET # BLD AUTO: 373 K/UL (ref 164–446)
PMV BLD AUTO: 9.5 FL (ref 9–12.9)
POTASSIUM SERPL-SCNC: 4.2 MMOL/L (ref 3.6–5.5)
PROT SERPL-MCNC: 7.3 G/DL (ref 6–8.2)
RBC # BLD AUTO: 5.53 M/UL (ref 4.2–5.4)
SODIUM SERPL-SCNC: 140 MMOL/L (ref 135–145)
TRIGL SERPL-MCNC: 33 MG/DL (ref 0–149)
TSH SERPL DL<=0.005 MIU/L-ACNC: 1.79 UIU/ML (ref 0.38–5.33)
WBC # BLD AUTO: 7.2 K/UL (ref 4.8–10.8)

## 2018-02-22 PROCEDURE — 84443 ASSAY THYROID STIM HORMONE: CPT

## 2018-02-22 PROCEDURE — 80053 COMPREHEN METABOLIC PANEL: CPT

## 2018-02-22 PROCEDURE — 85025 COMPLETE CBC W/AUTO DIFF WBC: CPT

## 2018-02-22 PROCEDURE — 80061 LIPID PANEL: CPT

## 2018-02-22 PROCEDURE — 36415 COLL VENOUS BLD VENIPUNCTURE: CPT

## 2018-02-22 PROCEDURE — 82306 VITAMIN D 25 HYDROXY: CPT

## 2018-05-16 ENCOUNTER — NON-PROVIDER VISIT (OUTPATIENT)
Dept: OCCUPATIONAL MEDICINE | Facility: CLINIC | Age: 23
End: 2018-05-16

## 2018-05-16 DIAGNOSIS — Z02.1 PRE-EMPLOYMENT DRUG SCREENING: ICD-10-CM

## 2018-05-16 LAB
AMP AMPHETAMINE: NORMAL
COC COCAINE: NORMAL
INT CON NEG: NORMAL
INT CON POS: NORMAL
MET METHAMPHETAMINES: NORMAL
OPI OPIATES: NORMAL
PCP PHENCYCLIDINE: NORMAL
POC DRUG COMMENT 753798-OCCUPATIONAL HEALTH: NORMAL
THC: NORMAL

## 2018-05-16 PROCEDURE — 80305 DRUG TEST PRSMV DIR OPT OBS: CPT | Performed by: PREVENTIVE MEDICINE

## 2018-07-27 ENCOUNTER — HOSPITAL ENCOUNTER (OUTPATIENT)
Dept: LAB | Facility: MEDICAL CENTER | Age: 23
End: 2018-07-27
Attending: OBSTETRICS & GYNECOLOGY
Payer: COMMERCIAL

## 2018-07-27 PROCEDURE — 86694 HERPES SIMPLEX NES ANTBDY: CPT

## 2018-07-27 PROCEDURE — 87389 HIV-1 AG W/HIV-1&-2 AB AG IA: CPT

## 2018-07-27 PROCEDURE — 86695 HERPES SIMPLEX TYPE 1 TEST: CPT | Mod: 91

## 2018-07-27 PROCEDURE — 86803 HEPATITIS C AB TEST: CPT

## 2018-07-27 PROCEDURE — 86696 HERPES SIMPLEX TYPE 2 TEST: CPT | Mod: 91

## 2018-07-27 PROCEDURE — 86780 TREPONEMA PALLIDUM: CPT

## 2018-07-28 LAB
HCV AB SER QL: NEGATIVE
HIV 1+2 AB+HIV1 P24 AG SERPL QL IA: NON REACTIVE
TREPONEMA PALLIDUM IGG+IGM AB [PRESENCE] IN SERUM OR PLASMA BY IMMUNOASSAY: NON REACTIVE

## 2018-07-29 LAB
HSV1 GG IGG SER-ACNC: 4.13 IV
HSV1+2 IGG SER IA-ACNC: 10.1 IV
HSV2 GG IGG SER-ACNC: 0.07 IV

## 2018-08-05 LAB — TEST NAME 95000: NORMAL

## 2018-09-05 ENCOUNTER — OFFICE VISIT (OUTPATIENT)
Dept: URGENT CARE | Facility: CLINIC | Age: 23
End: 2018-09-05
Payer: COMMERCIAL

## 2018-09-05 VITALS
DIASTOLIC BLOOD PRESSURE: 80 MMHG | HEIGHT: 70 IN | HEART RATE: 80 BPM | TEMPERATURE: 98.3 F | OXYGEN SATURATION: 96 % | SYSTOLIC BLOOD PRESSURE: 116 MMHG | BODY MASS INDEX: 32.33 KG/M2 | RESPIRATION RATE: 14 BRPM | WEIGHT: 225.8 LBS

## 2018-09-05 DIAGNOSIS — J02.0 STREP PHARYNGITIS: ICD-10-CM

## 2018-09-05 LAB
INT CON NEG: NORMAL
INT CON POS: NORMAL
S PYO AG THROAT QL: POSITIVE

## 2018-09-05 PROCEDURE — 87880 STREP A ASSAY W/OPTIC: CPT | Performed by: PHYSICIAN ASSISTANT

## 2018-09-05 PROCEDURE — 99214 OFFICE O/P EST MOD 30 MIN: CPT | Performed by: PHYSICIAN ASSISTANT

## 2018-09-05 RX ORDER — METHYLPREDNISOLONE 4 MG/1
TABLET ORAL
Qty: 1 KIT | Refills: 0 | Status: SHIPPED | OUTPATIENT
Start: 2018-09-05 | End: 2020-01-09

## 2018-09-05 RX ORDER — AMOXICILLIN AND CLAVULANATE POTASSIUM 875; 125 MG/1; MG/1
1 TABLET, FILM COATED ORAL 2 TIMES DAILY
Qty: 20 TAB | Refills: 0 | Status: SHIPPED | OUTPATIENT
Start: 2018-09-05 | End: 2018-09-15

## 2018-09-05 ASSESSMENT — ENCOUNTER SYMPTOMS
SORE THROAT: 1
SWOLLEN GLANDS: 1
VOMITING: 0
DIZZINESS: 0
CHILLS: 0
HEADACHES: 0
TROUBLE SWALLOWING: 1
ABDOMINAL PAIN: 0
COUGH: 0
SPUTUM PRODUCTION: 0
MUSCULOSKELETAL NEGATIVE: 1
DIARRHEA: 0
SHORTNESS OF BREATH: 0
NAUSEA: 0
FEVER: 0

## 2018-09-05 ASSESSMENT — PAIN SCALES - GENERAL: PAINLEVEL: NO PAIN

## 2018-09-06 NOTE — PROGRESS NOTES
"Subjective:      Mercy Almeida is a 23 y.o. female who presents with Sore Throat (X3 dry, scratchy, ongoing tonsil problems for a year, dry cough)            Pharyngitis    This is a new problem. The current episode started in the past 7 days (2 days). The problem has been unchanged. Neither side of throat is experiencing more pain than the other. There has been no fever. The pain is at a severity of 2/10. The pain is mild. Associated symptoms include swollen glands and trouble swallowing. Pertinent negatives include no abdominal pain, congestion, coughing, diarrhea, ear pain, headaches, shortness of breath or vomiting. She has had no exposure to strep or mono. She has tried nothing for the symptoms.       Review of Systems   Constitutional: Negative for chills and fever.   HENT: Positive for sore throat and trouble swallowing. Negative for congestion and ear pain.    Respiratory: Negative for cough, sputum production and shortness of breath.    Cardiovascular: Negative for chest pain.   Gastrointestinal: Negative for abdominal pain, diarrhea, nausea and vomiting.   Genitourinary: Negative.    Musculoskeletal: Negative.    Skin: Negative for rash.   Neurological: Negative for dizziness and headaches.          Objective:     /80   Pulse 80   Temp 36.8 °C (98.3 °F)   Resp 14   Ht 1.778 m (5' 10\")   Wt 102.4 kg (225 lb 12.8 oz)   LMP 08/01/2018   SpO2 96%   Breastfeeding? No   BMI 32.40 kg/m²      Physical Exam   Constitutional: She is oriented to person, place, and time. She appears well-developed and well-nourished. No distress.   HENT:   Head: Normocephalic and atraumatic.   Right Ear: Hearing, tympanic membrane, external ear and ear canal normal.   Left Ear: Hearing, tympanic membrane, external ear and ear canal normal.   Mouth/Throat: Uvula is midline. No trismus in the jaw. Posterior oropharyngeal erythema present. No oropharyngeal exudate or posterior oropharyngeal edema. Tonsils are " 3+ on the right. Tonsils are 2+ on the left. No tonsillar exudate.   Eyes: Pupils are equal, round, and reactive to light. Right eye exhibits no discharge. Left eye exhibits no discharge.   Neck: Normal range of motion.   Cardiovascular: Normal rate, regular rhythm and normal heart sounds.    No murmur heard.  Pulmonary/Chest: Effort normal and breath sounds normal. No respiratory distress. She has no wheezes. She has no rales.   Musculoskeletal: Normal range of motion.   Lymphadenopathy:     She has cervical adenopathy.   Neurological: She is alert and oriented to person, place, and time.   Skin: Skin is warm and dry. She is not diaphoretic.   Psychiatric: She has a normal mood and affect. Her behavior is normal.   Nursing note and vitals reviewed.         PMH:  has no past medical history on file.  MEDS:   Current Outpatient Prescriptions:   •  amoxicillin-clavulanate (AUGMENTIN) 875-125 MG Tab, Take 1 Tab by mouth 2 times a day for 10 days., Disp: 20 Tab, Rfl: 0  •  MethylPREDNISolone (MEDROL DOSEPAK) 4 MG Tablet Therapy Pack, Take as directed, Disp: 1 Kit, Rfl: 0  •  maalox plus-benadryl-visc lidocaine (MAGIC MOUTHWASH), Take 5 mL by mouth every 6 hours as needed., Disp: 100 mL, Rfl: 0  ALLERGIES: No Known Allergies  SURGHX: History reviewed. No pertinent surgical history.  SOCHX:  reports that she has never smoked. She has never used smokeless tobacco. She reports that she does not drink alcohol or use drugs.  FH: family history is not on file.       Assessment/Plan:     1. Strep pharyngitis  - POCT Rapid Strep A: POSITIVE  - amoxicillin-clavulanate (AUGMENTIN) 875-125 MG Tab; Take 1 Tab by mouth 2 times a day for 10 days.  Dispense: 20 Tab; Refill: 0   - Complete full course of antibiotics as prescribed   - MethylPREDNISolone (MEDROL DOSEPAK) 4 MG Tablet Therapy Pack; Take as directed  Dispense: 1 Kit; Refill: 0  - REFERRAL TO ENT    - Advised to throw away toothbrush and get a new one 2-3 days after  initiation of treatment  - Advised she is contagious for 24 hours after initiating treatment  - Call or return to office if symptoms persist or worsen  - Patient has had recurrent episodes of strep and tonsillitis for many years. This is the 4th episode in the past 6 months. Referral to ENT placed today for discussion of tonsillectomy

## 2019-02-21 ENCOUNTER — HOSPITAL ENCOUNTER (OUTPATIENT)
Dept: LAB | Facility: MEDICAL CENTER | Age: 24
End: 2019-02-21
Attending: CLINICAL NURSE SPECIALIST
Payer: COMMERCIAL

## 2019-02-21 PROCEDURE — 87205 SMEAR GRAM STAIN: CPT

## 2019-02-21 PROCEDURE — 87070 CULTURE OTHR SPECIMN AEROBIC: CPT

## 2019-02-22 LAB
GRAM STN SPEC: NORMAL
SIGNIFICANT IND 70042: NORMAL
SITE SITE: NORMAL
SOURCE SOURCE: NORMAL

## 2019-02-25 LAB
BACTERIA GENITAL AEROBE CULT: NORMAL
GRAM STN SPEC: NORMAL
SIGNIFICANT IND 70042: NORMAL
SITE SITE: NORMAL
SOURCE SOURCE: NORMAL

## 2019-04-05 ENCOUNTER — HOSPITAL ENCOUNTER (OUTPATIENT)
Dept: LAB | Facility: MEDICAL CENTER | Age: 24
End: 2019-04-05
Attending: CLINICAL NURSE SPECIALIST
Payer: COMMERCIAL

## 2019-04-05 ENCOUNTER — HOSPITAL ENCOUNTER (OUTPATIENT)
Dept: LAB | Facility: MEDICAL CENTER | Age: 24
End: 2019-04-05
Attending: FAMILY MEDICINE
Payer: COMMERCIAL

## 2019-04-05 LAB
25(OH)D3 SERPL-MCNC: 21 NG/ML (ref 30–100)
ALBUMIN SERPL BCP-MCNC: 4.2 G/DL (ref 3.2–4.9)
ALBUMIN SERPL BCP-MCNC: 4.3 G/DL (ref 3.2–4.9)
ALBUMIN/GLOB SERPL: 1.4 G/DL
ALBUMIN/GLOB SERPL: 1.4 G/DL
ALP SERPL-CCNC: 77 U/L (ref 30–99)
ALP SERPL-CCNC: 79 U/L (ref 30–99)
ALT SERPL-CCNC: 15 U/L (ref 2–50)
ALT SERPL-CCNC: 16 U/L (ref 2–50)
ANION GAP SERPL CALC-SCNC: 6 MMOL/L (ref 0–11.9)
ANION GAP SERPL CALC-SCNC: 8 MMOL/L (ref 0–11.9)
AST SERPL-CCNC: 15 U/L (ref 12–45)
AST SERPL-CCNC: 15 U/L (ref 12–45)
BASOPHILS # BLD AUTO: 0.3 % (ref 0–1.8)
BASOPHILS # BLD: 0.02 K/UL (ref 0–0.12)
BILIRUB SERPL-MCNC: 0.6 MG/DL (ref 0.1–1.5)
BILIRUB SERPL-MCNC: 0.7 MG/DL (ref 0.1–1.5)
BUN SERPL-MCNC: 15 MG/DL (ref 8–22)
BUN SERPL-MCNC: 15 MG/DL (ref 8–22)
CALCIUM SERPL-MCNC: 8.9 MG/DL (ref 8.5–10.5)
CALCIUM SERPL-MCNC: 9.3 MG/DL (ref 8.5–10.5)
CHLORIDE SERPL-SCNC: 110 MMOL/L (ref 96–112)
CHLORIDE SERPL-SCNC: 110 MMOL/L (ref 96–112)
CHOLEST SERPL-MCNC: 155 MG/DL (ref 100–199)
CO2 SERPL-SCNC: 23 MMOL/L (ref 20–33)
CO2 SERPL-SCNC: 24 MMOL/L (ref 20–33)
CREAT SERPL-MCNC: 0.82 MG/DL (ref 0.5–1.4)
CREAT SERPL-MCNC: 0.84 MG/DL (ref 0.5–1.4)
EOSINOPHIL # BLD AUTO: 0.11 K/UL (ref 0–0.51)
EOSINOPHIL NFR BLD: 1.8 % (ref 0–6.9)
ERYTHROCYTE [DISTWIDTH] IN BLOOD BY AUTOMATED COUNT: 43.3 FL (ref 35.9–50)
FASTING STATUS PATIENT QL REPORTED: NORMAL
FASTING STATUS PATIENT QL REPORTED: NORMAL
FSH SERPL-ACNC: 3.2 MIU/ML
GLOBULIN SER CALC-MCNC: 3 G/DL (ref 1.9–3.5)
GLOBULIN SER CALC-MCNC: 3.1 G/DL (ref 1.9–3.5)
GLUCOSE SERPL-MCNC: 98 MG/DL (ref 65–99)
GLUCOSE SERPL-MCNC: 98 MG/DL (ref 65–99)
HCT VFR BLD AUTO: 46.6 % (ref 37–47)
HDLC SERPL-MCNC: 48 MG/DL
HGB BLD-MCNC: 15.4 G/DL (ref 12–16)
IMM GRANULOCYTES # BLD AUTO: 0.01 K/UL (ref 0–0.11)
IMM GRANULOCYTES NFR BLD AUTO: 0.2 % (ref 0–0.9)
LDLC SERPL CALC-MCNC: 99 MG/DL
LH SERPL-ACNC: 4 IU/L
LYMPHOCYTES # BLD AUTO: 2.31 K/UL (ref 1–4.8)
LYMPHOCYTES NFR BLD: 37.8 % (ref 22–41)
MCH RBC QN AUTO: 27.8 PG (ref 27–33)
MCHC RBC AUTO-ENTMCNC: 33 G/DL (ref 33.6–35)
MCV RBC AUTO: 84.3 FL (ref 81.4–97.8)
MONOCYTES # BLD AUTO: 0.31 K/UL (ref 0–0.85)
MONOCYTES NFR BLD AUTO: 5.1 % (ref 0–13.4)
NEUTROPHILS # BLD AUTO: 3.35 K/UL (ref 2–7.15)
NEUTROPHILS NFR BLD: 54.8 % (ref 44–72)
NRBC # BLD AUTO: 0 K/UL
NRBC BLD-RTO: 0 /100 WBC
PLATELET # BLD AUTO: 387 K/UL (ref 164–446)
PMV BLD AUTO: 9.3 FL (ref 9–12.9)
POTASSIUM SERPL-SCNC: 3.8 MMOL/L (ref 3.6–5.5)
POTASSIUM SERPL-SCNC: 3.8 MMOL/L (ref 3.6–5.5)
PROLACTIN SERPL-MCNC: 17.48 NG/ML (ref 2.8–26)
PROT SERPL-MCNC: 7.3 G/DL (ref 6–8.2)
PROT SERPL-MCNC: 7.3 G/DL (ref 6–8.2)
RBC # BLD AUTO: 5.53 M/UL (ref 4.2–5.4)
SODIUM SERPL-SCNC: 140 MMOL/L (ref 135–145)
SODIUM SERPL-SCNC: 141 MMOL/L (ref 135–145)
TRIGL SERPL-MCNC: 38 MG/DL (ref 0–149)
TSH SERPL DL<=0.005 MIU/L-ACNC: 1.88 UIU/ML (ref 0.38–5.33)
WBC # BLD AUTO: 6.1 K/UL (ref 4.8–10.8)

## 2019-04-05 PROCEDURE — 85025 COMPLETE CBC W/AUTO DIFF WBC: CPT

## 2019-04-05 PROCEDURE — 84146 ASSAY OF PROLACTIN: CPT

## 2019-04-05 PROCEDURE — 80053 COMPREHEN METABOLIC PANEL: CPT | Mod: 91

## 2019-04-05 PROCEDURE — 80053 COMPREHEN METABOLIC PANEL: CPT

## 2019-04-05 PROCEDURE — 82306 VITAMIN D 25 HYDROXY: CPT

## 2019-04-05 PROCEDURE — 84270 ASSAY OF SEX HORMONE GLOBUL: CPT

## 2019-04-05 PROCEDURE — 83002 ASSAY OF GONADOTROPIN (LH): CPT

## 2019-04-05 PROCEDURE — 83001 ASSAY OF GONADOTROPIN (FSH): CPT

## 2019-04-05 PROCEDURE — 83525 ASSAY OF INSULIN: CPT

## 2019-04-05 PROCEDURE — 84403 ASSAY OF TOTAL TESTOSTERONE: CPT

## 2019-04-05 PROCEDURE — 80061 LIPID PANEL: CPT

## 2019-04-05 PROCEDURE — 36415 COLL VENOUS BLD VENIPUNCTURE: CPT

## 2019-04-05 PROCEDURE — 84443 ASSAY THYROID STIM HORMONE: CPT

## 2019-04-09 LAB
INSULIN P FAST SERPL-ACNC: 11 UIU/ML (ref 3–19)
SHBG SERPL-SCNC: 96 NMOL/L (ref 30–135)
TESTOST FREE SERPL-MCNC: 2.6 PG/ML (ref 0.8–7.4)
TESTOST SERPL-MCNC: 32 NG/DL (ref 9–55)

## 2019-04-27 ENCOUNTER — HOSPITAL ENCOUNTER (OUTPATIENT)
Dept: LAB | Facility: MEDICAL CENTER | Age: 24
End: 2019-04-27
Attending: OBSTETRICS & GYNECOLOGY
Payer: COMMERCIAL

## 2019-04-27 LAB — PROGEST SERPL-MCNC: 0.19 NG/ML

## 2019-04-27 PROCEDURE — 36415 COLL VENOUS BLD VENIPUNCTURE: CPT

## 2019-04-27 PROCEDURE — 84144 ASSAY OF PROGESTERONE: CPT

## 2019-04-28 ENCOUNTER — OFFICE VISIT (OUTPATIENT)
Dept: URGENT CARE | Facility: CLINIC | Age: 24
End: 2019-04-28
Payer: COMMERCIAL

## 2019-04-28 VITALS
WEIGHT: 225 LBS | OXYGEN SATURATION: 99 % | RESPIRATION RATE: 16 BRPM | DIASTOLIC BLOOD PRESSURE: 80 MMHG | HEART RATE: 73 BPM | HEIGHT: 70 IN | SYSTOLIC BLOOD PRESSURE: 106 MMHG | TEMPERATURE: 98.6 F | BODY MASS INDEX: 32.21 KG/M2

## 2019-04-28 DIAGNOSIS — R21 RASH, SKIN: ICD-10-CM

## 2019-04-28 DIAGNOSIS — L29.8 PRURITIC ERYTHEMATOUS RASH: ICD-10-CM

## 2019-04-28 PROCEDURE — 99214 OFFICE O/P EST MOD 30 MIN: CPT | Performed by: NURSE PRACTITIONER

## 2019-04-28 RX ORDER — TRIAMCINOLONE ACETONIDE 1 MG/G
1 CREAM TOPICAL 2 TIMES DAILY
Qty: 1 TUBE | Refills: 1 | Status: SHIPPED | OUTPATIENT
Start: 2019-04-28 | End: 2019-05-05

## 2019-04-28 NOTE — PROGRESS NOTES
"Subjective:      Mercy Almeida is a 23 y.o. female who presents with Rash (upper body itchi x2 days)            Rash   This is a new problem. Episode onset: pt reports new onset of rash to her upper chest, under her arms and sides of her abdomen that started one week ago. She does not know what caused the rash. The problem is unchanged. The rash is characterized by redness and itchiness. It is unknown if there was an exposure to a precipitant. Past treatments include cold compress. The treatment provided no relief. There is no history of allergies or eczema.       Review of Systems   Skin: Positive for itching and rash.   All other systems reviewed and are negative.    No past medical history on file. No past surgical history on file.   Social History     Social History   • Marital status: Single     Spouse name: N/A   • Number of children: N/A   • Years of education: N/A     Occupational History   • Not on file.     Social History Main Topics   • Smoking status: Never Smoker   • Smokeless tobacco: Never Used   • Alcohol use No   • Drug use: No   • Sexual activity: Yes     Partners: Male     Other Topics Concern   • Not on file     Social History Narrative   • No narrative on file          Objective:     /80 (BP Location: Left arm, Patient Position: Sitting, BP Cuff Size: Adult)   Pulse 73   Temp 37 °C (98.6 °F)   Resp 16   Ht 1.778 m (5' 10\")   Wt 102.1 kg (225 lb)   SpO2 99%   BMI 32.28 kg/m²      Physical Exam   Constitutional: She is oriented to person, place, and time. Vital signs are normal. She appears well-developed and well-nourished.   HENT:   Head: Normocephalic and atraumatic.   Right Ear: External ear normal.   Left Ear: External ear normal.   Nose: Nose normal.   Eyes: Pupils are equal, round, and reactive to light. EOM are normal.   Neck: Normal range of motion.   Cardiovascular: Normal rate and regular rhythm.    Pulmonary/Chest: Effort normal.   Musculoskeletal: Normal range " of motion.   Neurological: She is alert and oriented to person, place, and time.   Skin: Skin is warm and dry. Capillary refill takes less than 2 seconds. Rash noted. There is erythema.        Psychiatric: She has a normal mood and affect. Her speech is normal and behavior is normal. Thought content normal.   Vitals reviewed.              Assessment/Plan:     1. Pruritic erythematous rash  - triamcinolone acetonide (KENALOG) 0.1 % Cream; Apply 1 Application to affected area(s) 2 times a day for 7 days.  Dispense: 1 Tube; Refill: 1    2. Rash, skin    Advised pt to also take OTC Zyrtec daily for one week  OTC benadryl at night for itch relief  Keep skin clean and dry  Wear loose clothing to prevent irritation and chaffing  Supportive care, differential diagnoses, and indications for immediate follow-up discussed with patient.    Pathogenesis of diagnosis discussed including typical length and natural progression.      Instructed to return to  or nearest emergency department if symptoms fail to improve, for any change in condition, further concerns, or new concerning symptoms.  Patient states understanding of the plan of care and discharge instructions.

## 2019-06-11 ENCOUNTER — HOSPITAL ENCOUNTER (OUTPATIENT)
Dept: LAB | Facility: MEDICAL CENTER | Age: 24
End: 2019-06-11
Attending: OBSTETRICS & GYNECOLOGY
Payer: COMMERCIAL

## 2019-06-11 LAB — PROGEST SERPL-MCNC: 0.35 NG/ML

## 2019-06-11 PROCEDURE — 84144 ASSAY OF PROGESTERONE: CPT

## 2019-06-11 PROCEDURE — 36415 COLL VENOUS BLD VENIPUNCTURE: CPT

## 2019-09-18 ENCOUNTER — HOSPITAL ENCOUNTER (OUTPATIENT)
Facility: MEDICAL CENTER | Age: 24
End: 2019-09-18
Attending: PHYSICIAN ASSISTANT
Payer: COMMERCIAL

## 2019-09-18 ENCOUNTER — OFFICE VISIT (OUTPATIENT)
Dept: URGENT CARE | Facility: CLINIC | Age: 24
End: 2019-09-18
Payer: COMMERCIAL

## 2019-09-18 VITALS
BODY MASS INDEX: 32.21 KG/M2 | HEIGHT: 70 IN | DIASTOLIC BLOOD PRESSURE: 78 MMHG | RESPIRATION RATE: 16 BRPM | OXYGEN SATURATION: 97 % | TEMPERATURE: 98 F | SYSTOLIC BLOOD PRESSURE: 112 MMHG | HEART RATE: 82 BPM | WEIGHT: 225 LBS

## 2019-09-18 DIAGNOSIS — R10.13 EPIGASTRIC PAIN: ICD-10-CM

## 2019-09-18 DIAGNOSIS — R11.0 NAUSEA: ICD-10-CM

## 2019-09-18 LAB
ALBUMIN SERPL BCP-MCNC: 4.6 G/DL (ref 3.2–4.9)
ALBUMIN/GLOB SERPL: 1.5 G/DL
ALP SERPL-CCNC: 77 U/L (ref 30–99)
ALT SERPL-CCNC: 17 U/L (ref 2–50)
ANION GAP SERPL CALC-SCNC: 9 MMOL/L (ref 0–11.9)
APPEARANCE UR: NORMAL
AST SERPL-CCNC: 15 U/L (ref 12–45)
BASOPHILS # BLD AUTO: 0.3 % (ref 0–1.8)
BASOPHILS # BLD: 0.02 K/UL (ref 0–0.12)
BILIRUB SERPL-MCNC: 0.6 MG/DL (ref 0.1–1.5)
BILIRUB UR STRIP-MCNC: NORMAL MG/DL
BUN SERPL-MCNC: 17 MG/DL (ref 8–22)
CALCIUM SERPL-MCNC: 9.8 MG/DL (ref 8.5–10.5)
CHLORIDE SERPL-SCNC: 107 MMOL/L (ref 96–112)
CO2 SERPL-SCNC: 23 MMOL/L (ref 20–33)
COLOR UR AUTO: YELLOW
CREAT SERPL-MCNC: 0.98 MG/DL (ref 0.5–1.4)
EOSINOPHIL # BLD AUTO: 0.08 K/UL (ref 0–0.51)
EOSINOPHIL NFR BLD: 1.1 % (ref 0–6.9)
ERYTHROCYTE [DISTWIDTH] IN BLOOD BY AUTOMATED COUNT: 40.8 FL (ref 35.9–50)
GLOBULIN SER CALC-MCNC: 3.1 G/DL (ref 1.9–3.5)
GLUCOSE SERPL-MCNC: 99 MG/DL (ref 65–99)
GLUCOSE UR STRIP.AUTO-MCNC: NORMAL MG/DL
HCT VFR BLD AUTO: 46.9 % (ref 37–47)
HGB BLD-MCNC: 15.8 G/DL (ref 12–16)
IMM GRANULOCYTES # BLD AUTO: 0.02 K/UL (ref 0–0.11)
IMM GRANULOCYTES NFR BLD AUTO: 0.3 % (ref 0–0.9)
INT CON NEG: NEGATIVE
INT CON POS: POSITIVE
KETONES UR STRIP.AUTO-MCNC: NORMAL MG/DL
LEUKOCYTE ESTERASE UR QL STRIP.AUTO: NORMAL
LIPASE SERPL-CCNC: 23 U/L (ref 11–82)
LYMPHOCYTES # BLD AUTO: 2.95 K/UL (ref 1–4.8)
LYMPHOCYTES NFR BLD: 41.2 % (ref 22–41)
MCH RBC QN AUTO: 28.1 PG (ref 27–33)
MCHC RBC AUTO-ENTMCNC: 33.7 G/DL (ref 33.6–35)
MCV RBC AUTO: 83.3 FL (ref 81.4–97.8)
MONOCYTES # BLD AUTO: 0.36 K/UL (ref 0–0.85)
MONOCYTES NFR BLD AUTO: 5 % (ref 0–13.4)
NEUTROPHILS # BLD AUTO: 3.73 K/UL (ref 2–7.15)
NEUTROPHILS NFR BLD: 52.1 % (ref 44–72)
NITRITE UR QL STRIP.AUTO: NORMAL
NRBC # BLD AUTO: 0 K/UL
NRBC BLD-RTO: 0 /100 WBC
PH UR STRIP.AUTO: 6.5 [PH] (ref 5–8)
PLATELET # BLD AUTO: 412 K/UL (ref 164–446)
PMV BLD AUTO: 9.3 FL (ref 9–12.9)
POC URINE PREGNANCY TEST: NEGATIVE
POTASSIUM SERPL-SCNC: 3.8 MMOL/L (ref 3.6–5.5)
PROT SERPL-MCNC: 7.7 G/DL (ref 6–8.2)
PROT UR QL STRIP: 100 MG/DL
RBC # BLD AUTO: 5.63 M/UL (ref 4.2–5.4)
RBC UR QL AUTO: NORMAL
SODIUM SERPL-SCNC: 139 MMOL/L (ref 135–145)
SP GR UR STRIP.AUTO: 1.02
UROBILINOGEN UR STRIP-MCNC: 0.2 MG/DL
WBC # BLD AUTO: 7.2 K/UL (ref 4.8–10.8)

## 2019-09-18 PROCEDURE — 99214 OFFICE O/P EST MOD 30 MIN: CPT | Performed by: PHYSICIAN ASSISTANT

## 2019-09-18 PROCEDURE — 83690 ASSAY OF LIPASE: CPT

## 2019-09-18 PROCEDURE — 81002 URINALYSIS NONAUTO W/O SCOPE: CPT | Performed by: PHYSICIAN ASSISTANT

## 2019-09-18 PROCEDURE — 85025 COMPLETE CBC W/AUTO DIFF WBC: CPT

## 2019-09-18 PROCEDURE — 80053 COMPREHEN METABOLIC PANEL: CPT

## 2019-09-18 PROCEDURE — 81025 URINE PREGNANCY TEST: CPT | Performed by: PHYSICIAN ASSISTANT

## 2019-09-18 PROCEDURE — 99000 SPECIMEN HANDLING OFFICE-LAB: CPT | Performed by: PHYSICIAN ASSISTANT

## 2019-09-18 RX ORDER — ONDANSETRON 4 MG/1
4 TABLET, FILM COATED ORAL EVERY 4 HOURS PRN
Qty: 30 TAB | Refills: 0 | Status: SHIPPED | OUTPATIENT
Start: 2019-09-18 | End: 2020-01-09

## 2019-09-18 RX ORDER — ONDANSETRON 4 MG/1
4 TABLET, ORALLY DISINTEGRATING ORAL ONCE
Status: COMPLETED | OUTPATIENT
Start: 2019-09-18 | End: 2019-09-18

## 2019-09-18 RX ORDER — OMEPRAZOLE 20 MG/1
20 CAPSULE, DELAYED RELEASE ORAL DAILY
Qty: 30 CAP | Refills: 0 | Status: SHIPPED | OUTPATIENT
Start: 2019-09-18 | End: 2020-01-09

## 2019-09-18 RX ADMIN — ONDANSETRON 4 MG: 4 TABLET, ORALLY DISINTEGRATING ORAL at 12:36

## 2019-09-18 NOTE — PATIENT INSTRUCTIONS
Food Choices for Gastroesophageal Reflux Disease, Adult  When you have gastroesophageal reflux disease (GERD), the foods you eat and your eating habits are very important. Choosing the right foods can help ease your discomfort.   WHAT GUIDELINES DO I NEED TO FOLLOW?   · Choose fruits, vegetables, whole grains, and low-fat dairy products.    · Choose low-fat meat, fish, and poultry.  · Limit fats such as oils, salad dressings, butter, nuts, and avocado.    · Keep a food diary. This helps you identify foods that cause symptoms.    · Avoid foods that cause symptoms. These may be different for everyone.    · Eat small meals often instead of 3 large meals a day.    · Eat your meals slowly, in a place where you are relaxed.    · Limit fried foods.    · Cook foods using methods other than frying.    · Avoid drinking alcohol.    · Avoid drinking large amounts of liquids with your meals.    · Avoid bending over or lying down until 2-3 hours after eating.    WHAT FOODS ARE NOT RECOMMENDED?   These are some foods and drinks that may make your symptoms worse:  Vegetables  Tomatoes. Tomato juice. Tomato and spaghetti sauce. Chili peppers. Onion and garlic. Horseradish.  Fruits  Oranges, grapefruit, and lemon (fruit and juice).  Meats  High-fat meats, fish, and poultry. This includes hot dogs, ribs, ham, sausage, salami, and anaya.  Dairy  Whole milk and chocolate milk. Sour cream. Cream. Butter. Ice cream. Cream cheese.   Drinks  Coffee and tea. Bubbly (carbonated) drinks or energy drinks.  Condiments  Hot sauce. Barbecue sauce.   Sweets/Desserts  Chocolate and cocoa. Donuts. Peppermint and spearmint.  Fats and Oils  High-fat foods. This includes French fries and potato chips.  Other  Vinegar. Strong spices. This includes black pepper, white pepper, red pepper, cayenne, johnson powder, cloves, ginger, and chili powder.  The items listed above may not be a complete list of foods and drinks to avoid. Contact your dietitian for more  information.     This information is not intended to replace advice given to you by your health care provider. Make sure you discuss any questions you have with your health care provider.     Document Released: 06/18/2013 Document Revised: 01/08/2016 Document Reviewed: 10/22/2014  [employer] Interactive Patient Education ©2016 [employer]

## 2019-09-21 ASSESSMENT — ENCOUNTER SYMPTOMS
BLOOD IN STOOL: 0
ABDOMINAL PAIN: 1
COUGH: 0
MUSCULOSKELETAL NEGATIVE: 1
NAUSEA: 1
DIARRHEA: 0
MYALGIAS: 0
VOMITING: 0
SHORTNESS OF BREATH: 0
FEVER: 0
CONSTIPATION: 0
DIZZINESS: 0
FLATUS: 0
CHILLS: 0

## 2019-09-21 NOTE — PROGRESS NOTES
"Subjective:      Mercy Almeida is a 24 y.o. female who presents with Abdominal Pain (x 4 days, mid upper abdominal pain and nausea)            Abdominal Pain   This is a new problem. The current episode started in the past 7 days (4 days). The problem occurs constantly. The pain is located in the epigastric region. The pain is at a severity of 2/10. The pain is mild. Associated symptoms include nausea. Pertinent negatives include no constipation, diarrhea, dysuria, fever, flatus, frequency, myalgias or vomiting. The pain is aggravated by certain positions and eating. The pain is relieved by nothing. She has tried nothing for the symptoms. There is no history of abdominal surgery, gallstones, GERD, irritable bowel syndrome or ulcerative colitis.     Patient presents to urgent care reporting a 4 day history of intermittent upper abdominal pain with associated nausea (no vomiting). She denies fevers, chills, body aches, constipation, diarrhea, urinary symptoms, or bloody stools. She has been taking OTC antacids as needed without significant relief. No history of abdominal surgeries.     Review of Systems   Constitutional: Negative for chills and fever.   HENT: Negative for congestion.    Respiratory: Negative for cough and shortness of breath.    Cardiovascular: Negative for chest pain.   Gastrointestinal: Positive for abdominal pain and nausea. Negative for blood in stool, constipation, diarrhea, flatus and vomiting.   Genitourinary: Negative.  Negative for dysuria and frequency.   Musculoskeletal: Negative.  Negative for myalgias.   Skin: Negative for rash.   Neurological: Negative for dizziness.        Objective:     /78 (BP Location: Left arm, Patient Position: Sitting, BP Cuff Size: Large adult)   Pulse 82   Temp 36.7 °C (98 °F) (Temporal)   Resp 16   Ht 1.778 m (5' 10\")   Wt 102.1 kg (225 lb)   SpO2 97%   BMI 32.28 kg/m²      Physical Exam   Constitutional: She is oriented to person, " place, and time. She appears well-developed and well-nourished. No distress.   HENT:   Head: Normocephalic and atraumatic.   Eyes: Pupils are equal, round, and reactive to light.   Neck: Normal range of motion.   Cardiovascular: Normal rate, regular rhythm and normal heart sounds.   No murmur heard.  Pulmonary/Chest: Effort normal and breath sounds normal. No stridor. No respiratory distress. She has no wheezes.   Abdominal: Soft. Normal appearance and bowel sounds are normal. She exhibits no distension. There is tenderness in the epigastric area. There is no rebound, no guarding, no CVA tenderness, no tenderness at McBurney's point and negative Jordan's sign.   Musculoskeletal: Normal range of motion.   Neurological: She is alert and oriented to person, place, and time.   Skin: Skin is warm and dry. She is not diaphoretic.   Psychiatric: She has a normal mood and affect. Her behavior is normal.   Nursing note and vitals reviewed.            POCT Urinalysis   Order: [id number]   Status:  Final result   Visible to patient:  Yes (MyChart) Next appt:  None Dx:  Epigastric pain    Ref Range & Units 3d ago   POC Color Negative yellow    POC Appearance Negative cloudy    POC Leukocyte Esterase Negative trace    POC Nitrites Negative neg    POC Urobiligen Negative (0.2) mg/dL 0.2    POC Protein Negative mg/dL 100    POC Urine PH 5.0 - 8.0 6.5    POC Blood Negative trace    POC Specific Gravity <1.005 - >1.030 1.025    POC Ketones Negative mg/dL neg    POC Bilirubin Negative mg/dL neg    POC Glucose Negative mg/dL neg                Assessment/Plan:     1. Epigastric pain  - POCT Urinalysis --> + protein, trace blood  - POCT Pregnancy --> negative   - CBC WITH DIFFERENTIAL; Future  - Comp Metabolic Panel; Future  - LIPASE; Future  - omeprazole (PRILOSEC) 20 MG delayed-release capsule; Take 1 Cap by mouth every day.  Dispense: 30 Cap; Refill: 0    2. Nausea  - ondansetron (ZOFRAN ODT) dispertab 4 mg   - Given in urgent care  with good relief of nausea   - ondansetron (ZOFRAN) 4 MG Tab tablet; Take 1 Tab by mouth every four hours as needed for Nausea/Vomiting.  Dispense: 30 Tab; Refill: 0    Patient given a GI cocktail in clinic with moderate relief of symptoms. Encouraged increased fluids and bland diet. Prilosec 20 mg daily x 2-4 weeks. GERD diet discussed. Call or return to office if symptoms persist or worsen. The patient demonstrated a good understanding and agreed with the treatment plan.

## 2020-01-09 ENCOUNTER — APPOINTMENT (OUTPATIENT)
Dept: RADIOLOGY | Facility: MEDICAL CENTER | Age: 25
DRG: 948 | End: 2020-01-09
Attending: EMERGENCY MEDICINE
Payer: COMMERCIAL

## 2020-01-09 ENCOUNTER — HOSPITAL ENCOUNTER (INPATIENT)
Facility: MEDICAL CENTER | Age: 25
LOS: 2 days | DRG: 948 | End: 2020-01-12
Attending: EMERGENCY MEDICINE | Admitting: HOSPITALIST
Payer: COMMERCIAL

## 2020-01-09 DIAGNOSIS — G43.111 INTRACTABLE MIGRAINE WITH AURA WITH STATUS MIGRAINOSUS: ICD-10-CM

## 2020-01-09 DIAGNOSIS — E55.9 VITAMIN D DEFICIENCY: ICD-10-CM

## 2020-01-09 DIAGNOSIS — R53.1 WEAKNESS: ICD-10-CM

## 2020-01-09 DIAGNOSIS — D32.9 MENINGIOMA (HCC): ICD-10-CM

## 2020-01-09 LAB
ALBUMIN SERPL BCP-MCNC: 5 G/DL (ref 3.2–4.9)
ALBUMIN/GLOB SERPL: 1.5 G/DL
ALP SERPL-CCNC: 86 U/L (ref 30–99)
ALT SERPL-CCNC: 10 U/L (ref 2–50)
ANION GAP SERPL CALC-SCNC: 12 MMOL/L (ref 0–11.9)
AST SERPL-CCNC: 15 U/L (ref 12–45)
BASOPHILS # BLD AUTO: 0.3 % (ref 0–1.8)
BASOPHILS # BLD: 0.02 K/UL (ref 0–0.12)
BILIRUB SERPL-MCNC: 0.7 MG/DL (ref 0.1–1.5)
BUN SERPL-MCNC: 21 MG/DL (ref 8–22)
CALCIUM SERPL-MCNC: 10.3 MG/DL (ref 8.5–10.5)
CHLORIDE SERPL-SCNC: 104 MMOL/L (ref 96–112)
CK SERPL-CCNC: 64 U/L (ref 0–154)
CO2 SERPL-SCNC: 23 MMOL/L (ref 20–33)
CREAT SERPL-MCNC: 0.95 MG/DL (ref 0.5–1.4)
EOSINOPHIL # BLD AUTO: 0.1 K/UL (ref 0–0.51)
EOSINOPHIL NFR BLD: 1.3 % (ref 0–6.9)
ERYTHROCYTE [DISTWIDTH] IN BLOOD BY AUTOMATED COUNT: 39.1 FL (ref 35.9–50)
GLOBULIN SER CALC-MCNC: 3.4 G/DL (ref 1.9–3.5)
GLUCOSE SERPL-MCNC: 73 MG/DL (ref 65–99)
HCG SERPL QL: NEGATIVE
HCT VFR BLD AUTO: 51.8 % (ref 37–47)
HGB BLD-MCNC: 17.7 G/DL (ref 12–16)
IMM GRANULOCYTES # BLD AUTO: 0.01 K/UL (ref 0–0.11)
IMM GRANULOCYTES NFR BLD AUTO: 0.1 % (ref 0–0.9)
LACTATE BLD-SCNC: 2 MMOL/L (ref 0.5–2)
LYMPHOCYTES # BLD AUTO: 2.77 K/UL (ref 1–4.8)
LYMPHOCYTES NFR BLD: 35.7 % (ref 22–41)
MAGNESIUM SERPL-MCNC: 2 MG/DL (ref 1.5–2.5)
MCH RBC QN AUTO: 28.1 PG (ref 27–33)
MCHC RBC AUTO-ENTMCNC: 34.2 G/DL (ref 33.6–35)
MCV RBC AUTO: 82.1 FL (ref 81.4–97.8)
MONOCYTES # BLD AUTO: 0.43 K/UL (ref 0–0.85)
MONOCYTES NFR BLD AUTO: 5.5 % (ref 0–13.4)
NEUTROPHILS # BLD AUTO: 4.43 K/UL (ref 2–7.15)
NEUTROPHILS NFR BLD: 57.1 % (ref 44–72)
NRBC # BLD AUTO: 0 K/UL
NRBC BLD-RTO: 0 /100 WBC
PLATELET # BLD AUTO: 396 K/UL (ref 164–446)
PMV BLD AUTO: 9.3 FL (ref 9–12.9)
POTASSIUM SERPL-SCNC: 3.9 MMOL/L (ref 3.6–5.5)
PROT SERPL-MCNC: 8.4 G/DL (ref 6–8.2)
RBC # BLD AUTO: 6.31 M/UL (ref 4.2–5.4)
SODIUM SERPL-SCNC: 139 MMOL/L (ref 135–145)
WBC # BLD AUTO: 7.8 K/UL (ref 4.8–10.8)

## 2020-01-09 PROCEDURE — G0378 HOSPITAL OBSERVATION PER HR: HCPCS

## 2020-01-09 PROCEDURE — 85025 COMPLETE CBC W/AUTO DIFF WBC: CPT

## 2020-01-09 PROCEDURE — 99285 EMERGENCY DEPT VISIT HI MDM: CPT

## 2020-01-09 PROCEDURE — 70450 CT HEAD/BRAIN W/O DYE: CPT

## 2020-01-09 PROCEDURE — 99220 PR INITIAL OBSERVATION CARE,LEVL III: CPT | Performed by: HOSPITALIST

## 2020-01-09 PROCEDURE — 700105 HCHG RX REV CODE 258: Performed by: HOSPITALIST

## 2020-01-09 PROCEDURE — 84703 CHORIONIC GONADOTROPIN ASSAY: CPT

## 2020-01-09 PROCEDURE — 83735 ASSAY OF MAGNESIUM: CPT

## 2020-01-09 PROCEDURE — 82550 ASSAY OF CK (CPK): CPT

## 2020-01-09 PROCEDURE — 83605 ASSAY OF LACTIC ACID: CPT

## 2020-01-09 PROCEDURE — 36415 COLL VENOUS BLD VENIPUNCTURE: CPT

## 2020-01-09 PROCEDURE — 80053 COMPREHEN METABOLIC PANEL: CPT

## 2020-01-09 RX ORDER — PROCHLORPERAZINE EDISYLATE 5 MG/ML
5-10 INJECTION INTRAMUSCULAR; INTRAVENOUS EVERY 4 HOURS PRN
Status: DISCONTINUED | OUTPATIENT
Start: 2020-01-09 | End: 2020-01-12 | Stop reason: HOSPADM

## 2020-01-09 RX ORDER — ONDANSETRON 2 MG/ML
4 INJECTION INTRAMUSCULAR; INTRAVENOUS EVERY 4 HOURS PRN
Status: DISCONTINUED | OUTPATIENT
Start: 2020-01-09 | End: 2020-01-12 | Stop reason: HOSPADM

## 2020-01-09 RX ORDER — PROMETHAZINE HYDROCHLORIDE 25 MG/1
12.5-25 TABLET ORAL EVERY 4 HOURS PRN
Status: DISCONTINUED | OUTPATIENT
Start: 2020-01-09 | End: 2020-01-12 | Stop reason: HOSPADM

## 2020-01-09 RX ORDER — ONDANSETRON 4 MG/1
4 TABLET, ORALLY DISINTEGRATING ORAL EVERY 4 HOURS PRN
Status: DISCONTINUED | OUTPATIENT
Start: 2020-01-09 | End: 2020-01-12 | Stop reason: HOSPADM

## 2020-01-09 RX ORDER — BISACODYL 10 MG
10 SUPPOSITORY, RECTAL RECTAL
Status: DISCONTINUED | OUTPATIENT
Start: 2020-01-09 | End: 2020-01-12 | Stop reason: HOSPADM

## 2020-01-09 RX ORDER — PROMETHAZINE HYDROCHLORIDE 25 MG/1
12.5-25 SUPPOSITORY RECTAL EVERY 4 HOURS PRN
Status: DISCONTINUED | OUTPATIENT
Start: 2020-01-09 | End: 2020-01-12 | Stop reason: HOSPADM

## 2020-01-09 RX ORDER — POLYETHYLENE GLYCOL 3350 17 G/17G
1 POWDER, FOR SOLUTION ORAL
Status: DISCONTINUED | OUTPATIENT
Start: 2020-01-09 | End: 2020-01-12 | Stop reason: HOSPADM

## 2020-01-09 RX ORDER — AMOXICILLIN 250 MG
2 CAPSULE ORAL 2 TIMES DAILY
Status: DISCONTINUED | OUTPATIENT
Start: 2020-01-09 | End: 2020-01-12 | Stop reason: HOSPADM

## 2020-01-09 RX ORDER — ACETAMINOPHEN 325 MG/1
650 TABLET ORAL EVERY 6 HOURS PRN
Status: DISCONTINUED | OUTPATIENT
Start: 2020-01-09 | End: 2020-01-12 | Stop reason: HOSPADM

## 2020-01-09 RX ORDER — SODIUM CHLORIDE 9 MG/ML
INJECTION, SOLUTION INTRAVENOUS CONTINUOUS
Status: DISCONTINUED | OUTPATIENT
Start: 2020-01-09 | End: 2020-01-12 | Stop reason: HOSPADM

## 2020-01-09 RX ADMIN — SODIUM CHLORIDE: 9 INJECTION, SOLUTION INTRAVENOUS at 22:42

## 2020-01-09 ASSESSMENT — ENCOUNTER SYMPTOMS
HEARTBURN: 0
SPUTUM PRODUCTION: 0
HEMOPTYSIS: 0
WEIGHT LOSS: 0
ABDOMINAL PAIN: 0
BACK PAIN: 0
VOMITING: 0
ORTHOPNEA: 0
PALPITATIONS: 0
FEVER: 0
TINGLING: 0
SINUS PAIN: 0
NAUSEA: 0
MYALGIAS: 0
NECK PAIN: 0
COUGH: 0
CLAUDICATION: 0
CHILLS: 0
BRUISES/BLEEDS EASILY: 0
PHOTOPHOBIA: 0
HEADACHES: 0
DOUBLE VISION: 1
DIZZINESS: 0
DEPRESSION: 0
EYE PAIN: 0
BLURRED VISION: 0

## 2020-01-09 ASSESSMENT — LIFESTYLE VARIABLES
TOTAL SCORE: 0
TOTAL SCORE: 0
EVER_SMOKED: NEVER
HAVE PEOPLE ANNOYED YOU BY CRITICIZING YOUR DRINKING: NO
EVER FELT BAD OR GUILTY ABOUT YOUR DRINKING: NO
DO YOU DRINK ALCOHOL: NO
TOTAL SCORE: 0
HAVE YOU EVER FELT YOU SHOULD CUT DOWN ON YOUR DRINKING: NO
CONSUMPTION TOTAL: INCOMPLETE
SUBSTANCE_ABUSE: 0
EVER HAD A DRINK FIRST THING IN THE MORNING TO STEADY YOUR NERVES TO GET RID OF A HANGOVER: NO

## 2020-01-09 ASSESSMENT — PATIENT HEALTH QUESTIONNAIRE - PHQ9
SUM OF ALL RESPONSES TO PHQ9 QUESTIONS 1 AND 2: 0
2. FEELING DOWN, DEPRESSED, IRRITABLE, OR HOPELESS: NOT AT ALL
1. LITTLE INTEREST OR PLEASURE IN DOING THINGS: NOT AT ALL

## 2020-01-09 ASSESSMENT — COPD QUESTIONNAIRES
HAVE YOU SMOKED AT LEAST 100 CIGARETTES IN YOUR ENTIRE LIFE: NO/DON'T KNOW
DURING THE PAST 4 WEEKS HOW MUCH DID YOU FEEL SHORT OF BREATH: NONE/LITTLE OF THE TIME
COPD SCREENING SCORE: 0
DO YOU EVER COUGH UP ANY MUCUS OR PHLEGM?: NO/ONLY WITH OCCASIONAL COLDS OR INFECTIONS

## 2020-01-09 NOTE — ED TRIAGE NOTES
Comes in stating she feel weak and tired.  No other symptoms noted.  No noted chills or fevers.  No prior history.   Denies current pregnancy.

## 2020-01-09 NOTE — ED PROVIDER NOTES
"ED Provider  Scribed for Jimi Muñoz D.O. by Olivia Montesinos. 1/9/2020  3:39 PM    Means of arrival:Walk-In  History obtained from:Patient  History limited by: None    CHIEF COMPLAINT  Chief Complaint   Patient presents with   • Weakness     HPI  Mercy Almeida is a 24 y.o. female who presents with complaints of generalized muscle weakness, more prominently to her bilateral upper extremities, onset 4 days ago. Patient states due to her weakness she has had difficulty getting out of bed and feels like her legs are going to give out if she walks too fast. Her weakness starts as soon as she wakes up in the morning. She has never had these symptoms in the past. She also endorses associated neck stiffness and leg cramping since last night and mentions that she will sometimes have double vision. Denies fever, chills, cough, congestion, vomiting, arthralgia, nausea, or diarrhea. Denies chance of pregnancy.     REVIEW OF SYSTEMS  See HPI for further details. All other systems are negative.     PAST MEDICAL HISTORY   Denies a past medical history of diabetes or hypertension.     SOCIAL HISTORY  Social History     Tobacco Use   • Smoking status: Never Smoker   • Smokeless tobacco: Never Used   Substance and Sexual Activity   • Alcohol use: No   • Drug use: No   • Sexual activity: Yes     Partners: Male       SURGICAL HISTORY  patient denies any surgical history    CURRENT MEDICATIONS  Home Medications     Reviewed by Augustine Loya (Pharmacy Tech) on 01/09/20 at 1721  Med List Status: Complete   Medication Last Dose Status        Patient Migel Taking any Medications                       ALLERGIES  No Known Allergies    PHYSICAL EXAM  VITAL SIGNS: /96   Pulse 96   Temp 36.2 °C (97.2 °F) (Temporal)   Resp 16   Ht 1.778 m (5' 10\")   Wt 88.1 kg (194 lb 3.6 oz)   SpO2 90%   BMI 27.87 kg/m²     Constitutional: Alert in no apparent distress.  HENT: No signs of trauma, mucous membranes are " moist  Eyes: Pupils are equal. EOMI. She is able to hold an upward gaze for 10 seconds. Conjunctiva normal, Non-icteric.   Neck: Normal range of motion, No tenderness, Supple.  Lymphatic: No lymphadenopathy noted.   Cardiovascular: Regular rate and rhythm, no murmurs.   Thorax & Lungs: Normal breath sounds, No respiratory distress, No wheezing, No chest tenderness.   Abdomen: Bowel sounds normal, Soft, No tenderness, No masses, No pulsatile masses. No peritoneal signs.  Skin: Warm, Dry, normal color.   Back: No bony tenderness, No CVA tenderness.   Extremities: No edema, No tenderness, No cyanosis  Musculoskeletal: Good range of motion in all major joints. No tenderness to palpation or major deformities noted.   Neurologic: Mild generalized weakness, no lateralizing weakness. Alert and oriented x4, No focal deficits noted.   Psychiatric: Affect normal, Judgment normal, Mood normal.       MEDICAL DECISION MAKING  This is a 24 y.o. female who presents with generalized allover weakness.  There was concerns for myasthenia gravis but she does have a upward gaze that does not seem to tire.  She has weakness in all extremities.  There is concerns for electrolyte imbalance anemia, dehydration multiple sclerosis.  CT does show a calcific mass in the left frontal lobe consistent with meningioma.  There is no signs of surrounding edema.  There is no bleeding.  I do not know if this is the etiology of her symptoms, or if there may be multiple sclerosis.  Spoke with the hospitalist for admission she will be admitted for further evaluation and treatment.  I spoke with the patient concerning these findings and the need for admission.      DIAGNOSTIC STUDIES / PROCEDURES    LABS  Results for orders placed or performed during the hospital encounter of 01/09/20   CBC WITH DIFFERENTIAL   Result Value Ref Range    WBC 7.8 4.8 - 10.8 K/uL    RBC 6.31 (H) 4.20 - 5.40 M/uL    Hemoglobin 17.7 (H) 12.0 - 16.0 g/dL    Hematocrit 51.8 (H)  37.0 - 47.0 %    MCV 82.1 81.4 - 97.8 fL    MCH 28.1 27.0 - 33.0 pg    MCHC 34.2 33.6 - 35.0 g/dL    RDW 39.1 35.9 - 50.0 fL    Platelet Count 396 164 - 446 K/uL    MPV 9.3 9.0 - 12.9 fL    Neutrophils-Polys 57.10 44.00 - 72.00 %    Lymphocytes 35.70 22.00 - 41.00 %    Monocytes 5.50 0.00 - 13.40 %    Eosinophils 1.30 0.00 - 6.90 %    Basophils 0.30 0.00 - 1.80 %    Immature Granulocytes 0.10 0.00 - 0.90 %    Nucleated RBC 0.00 /100 WBC    Neutrophils (Absolute) 4.43 2.00 - 7.15 K/uL    Lymphs (Absolute) 2.77 1.00 - 4.80 K/uL    Monos (Absolute) 0.43 0.00 - 0.85 K/uL    Eos (Absolute) 0.10 0.00 - 0.51 K/uL    Baso (Absolute) 0.02 0.00 - 0.12 K/uL    Immature Granulocytes (abs) 0.01 0.00 - 0.11 K/uL    NRBC (Absolute) 0.00 K/uL   COMP METABOLIC PANEL   Result Value Ref Range    Sodium 139 135 - 145 mmol/L    Potassium 3.9 3.6 - 5.5 mmol/L    Chloride 104 96 - 112 mmol/L    Co2 23 20 - 33 mmol/L    Anion Gap 12.0 (H) 0.0 - 11.9    Glucose 73 65 - 99 mg/dL    Bun 21 8 - 22 mg/dL    Creatinine 0.95 0.50 - 1.40 mg/dL    Calcium 10.3 8.5 - 10.5 mg/dL    AST(SGOT) 15 12 - 45 U/L    ALT(SGPT) 10 2 - 50 U/L    Alkaline Phosphatase 86 30 - 99 U/L    Total Bilirubin 0.7 0.1 - 1.5 mg/dL    Albumin 5.0 (H) 3.2 - 4.9 g/dL    Total Protein 8.4 (H) 6.0 - 8.2 g/dL    Globulin 3.4 1.9 - 3.5 g/dL    A-G Ratio 1.5 g/dL   LACTIC ACID   Result Value Ref Range    Lactic Acid 2.0 0.5 - 2.0 mmol/L   CREATINE KINASE   Result Value Ref Range    CPK Total 64 0 - 154 U/L   HCG QUAL SERUM   Result Value Ref Range    Beta-Hcg Qualitative Serum Negative Negative   ESTIMATED GFR   Result Value Ref Range    GFR If African American >60 >60 mL/min/1.73 m 2    GFR If Non African American >60 >60 mL/min/1.73 m 2     All labs reviewed by me.    RADIOLOGY  CT-HEAD W/O   Final Result      1. No acute intracranial abnormality.   2. 2 x 1.5 x 1.7 cm extra-axial calcified lesion at the parasagittal left frontal vertex is nonspecific, possibly representing an  ossified meningioma.        The radiologist's interpretations of all radiological studies have been reviewed by me.          COURSE  Pertinent Labs & Imaging studies reviewed. (See chart for details)    3:39 PM - Patient seen and examined at bedside. Discussed plan of care with the patient which includes blood tests to check her potassium and sodium and CT-head to rule out intracranial abnormality. Patient verbalized her understanding and agrees with the plan of care. Ordered for CT-head, urinalysis culture, HCG qual serum, CBC with differential, CMP, lactic acid, creatine kinase, and estimated GFR to evaluate her symptoms.       DISPOSITION:  Patient will be hospitalized by hospitalist in stable condition    FINAL IMPRESSION  1. Weakness    2. Meningioma (HCC)         Olivia ALTMAN (Scribe), am scribing for, and in the presence of, Jimi Muñoz D.O..    Electronically signed by: Olivia Montesinos (Scribe), 1/9/2020    Jimi ALTMAN D.O. personally performed the services described in this documentation, as scribed by Olivia Montesinos in my presence, and it is both accurate and complete. C    The note accurately reflects work and decisions made by me.  Jimi Muñoz  1/9/2020  5:25 PM

## 2020-01-10 ENCOUNTER — APPOINTMENT (OUTPATIENT)
Dept: RADIOLOGY | Facility: MEDICAL CENTER | Age: 25
DRG: 948 | End: 2020-01-10
Attending: HOSPITALIST
Payer: COMMERCIAL

## 2020-01-10 PROBLEM — E55.9 VITAMIN D DEFICIENCY: Status: ACTIVE | Noted: 2020-01-10

## 2020-01-10 LAB
25(OH)D3 SERPL-MCNC: 24 NG/ML (ref 30–100)
ALBUMIN SERPL BCP-MCNC: 4 G/DL (ref 3.2–4.9)
ALBUMIN/GLOB SERPL: 1.7 G/DL
ALP SERPL-CCNC: 69 U/L (ref 30–99)
ALT SERPL-CCNC: 7 U/L (ref 2–50)
ANION GAP SERPL CALC-SCNC: 10 MMOL/L (ref 0–11.9)
AST SERPL-CCNC: 11 U/L (ref 12–45)
BILIRUB SERPL-MCNC: 0.5 MG/DL (ref 0.1–1.5)
BUN SERPL-MCNC: 19 MG/DL (ref 8–22)
CALCIUM SERPL-MCNC: 9.2 MG/DL (ref 8.5–10.5)
CHLORIDE SERPL-SCNC: 106 MMOL/L (ref 96–112)
CHOLEST SERPL-MCNC: 129 MG/DL (ref 100–199)
CO2 SERPL-SCNC: 25 MMOL/L (ref 20–33)
CREAT SERPL-MCNC: 0.99 MG/DL (ref 0.5–1.4)
CRP SERPL HS-MCNC: 0.27 MG/DL (ref 0–0.75)
ERYTHROCYTE [DISTWIDTH] IN BLOOD BY AUTOMATED COUNT: 39.7 FL (ref 35.9–50)
ERYTHROCYTE [SEDIMENTATION RATE] IN BLOOD BY WESTERGREN METHOD: 3 MM/HOUR (ref 0–20)
GLOBULIN SER CALC-MCNC: 2.4 G/DL (ref 1.9–3.5)
GLUCOSE SERPL-MCNC: 78 MG/DL (ref 65–99)
HCT VFR BLD AUTO: 45.9 % (ref 37–47)
HDLC SERPL-MCNC: 40 MG/DL
HGB BLD-MCNC: 15.4 G/DL (ref 12–16)
HIV 1+2 AB+HIV1 P24 AG SERPL QL IA: NON REACTIVE
LDLC SERPL CALC-MCNC: 80 MG/DL
MCH RBC QN AUTO: 28 PG (ref 27–33)
MCHC RBC AUTO-ENTMCNC: 33.6 G/DL (ref 33.6–35)
MCV RBC AUTO: 83.5 FL (ref 81.4–97.8)
PLATELET # BLD AUTO: 342 K/UL (ref 164–446)
PMV BLD AUTO: 9.2 FL (ref 9–12.9)
POTASSIUM SERPL-SCNC: 3.7 MMOL/L (ref 3.6–5.5)
PROCALCITONIN SERPL-MCNC: <0.05 NG/ML
PROT SERPL-MCNC: 6.4 G/DL (ref 6–8.2)
RBC # BLD AUTO: 5.5 M/UL (ref 4.2–5.4)
SODIUM SERPL-SCNC: 141 MMOL/L (ref 135–145)
TREPONEMA PALLIDUM IGG+IGM AB [PRESENCE] IN SERUM OR PLASMA BY IMMUNOASSAY: NON REACTIVE
TRIGL SERPL-MCNC: 44 MG/DL (ref 0–149)
VIT B12 SERPL-MCNC: 415 PG/ML (ref 211–911)
WBC # BLD AUTO: 7.3 K/UL (ref 4.8–10.8)

## 2020-01-10 PROCEDURE — 700105 HCHG RX REV CODE 258: Performed by: HOSPITALIST

## 2020-01-10 PROCEDURE — 80061 LIPID PANEL: CPT

## 2020-01-10 PROCEDURE — 80053 COMPREHEN METABOLIC PANEL: CPT

## 2020-01-10 PROCEDURE — 85652 RBC SED RATE AUTOMATED: CPT

## 2020-01-10 PROCEDURE — 86038 ANTINUCLEAR ANTIBODIES: CPT

## 2020-01-10 PROCEDURE — 700111 HCHG RX REV CODE 636 W/ 250 OVERRIDE (IP): Performed by: HOSPITALIST

## 2020-01-10 PROCEDURE — 99232 SBSQ HOSP IP/OBS MODERATE 35: CPT | Performed by: HOSPITALIST

## 2020-01-10 PROCEDURE — 700117 HCHG RX CONTRAST REV CODE 255: Performed by: HOSPITALIST

## 2020-01-10 PROCEDURE — 85027 COMPLETE CBC AUTOMATED: CPT

## 2020-01-10 PROCEDURE — 86780 TREPONEMA PALLIDUM: CPT

## 2020-01-10 PROCEDURE — 36415 COLL VENOUS BLD VENIPUNCTURE: CPT

## 2020-01-10 PROCEDURE — 86140 C-REACTIVE PROTEIN: CPT

## 2020-01-10 PROCEDURE — 82607 VITAMIN B-12: CPT

## 2020-01-10 PROCEDURE — 770006 HCHG ROOM/CARE - MED/SURG/GYN SEMI*

## 2020-01-10 PROCEDURE — 72157 MRI CHEST SPINE W/O & W/DYE: CPT

## 2020-01-10 PROCEDURE — 86039 ANTINUCLEAR ANTIBODIES (ANA): CPT

## 2020-01-10 PROCEDURE — 72158 MRI LUMBAR SPINE W/O & W/DYE: CPT

## 2020-01-10 PROCEDURE — 97165 OT EVAL LOW COMPLEX 30 MIN: CPT

## 2020-01-10 PROCEDURE — 70553 MRI BRAIN STEM W/O & W/DYE: CPT

## 2020-01-10 PROCEDURE — 84145 PROCALCITONIN (PCT): CPT

## 2020-01-10 PROCEDURE — A9576 INJ PROHANCE MULTIPACK: HCPCS | Performed by: HOSPITALIST

## 2020-01-10 PROCEDURE — 97161 PT EVAL LOW COMPLEX 20 MIN: CPT

## 2020-01-10 PROCEDURE — 72156 MRI NECK SPINE W/O & W/DYE: CPT

## 2020-01-10 PROCEDURE — 96374 THER/PROPH/DIAG INJ IV PUSH: CPT

## 2020-01-10 PROCEDURE — 82306 VITAMIN D 25 HYDROXY: CPT

## 2020-01-10 PROCEDURE — 87389 HIV-1 AG W/HIV-1&-2 AB AG IA: CPT

## 2020-01-10 RX ORDER — LORAZEPAM 2 MG/ML
0.5 INJECTION INTRAMUSCULAR ONCE
Status: COMPLETED | OUTPATIENT
Start: 2020-01-10 | End: 2020-01-10

## 2020-01-10 RX ADMIN — GADOTERIDOL 15 ML: 279.3 INJECTION, SOLUTION INTRAVENOUS at 22:27

## 2020-01-10 RX ADMIN — SODIUM CHLORIDE: 9 INJECTION, SOLUTION INTRAVENOUS at 18:58

## 2020-01-10 RX ADMIN — LORAZEPAM 0.5 MG: 2 INJECTION INTRAMUSCULAR; INTRAVENOUS at 20:09

## 2020-01-10 ASSESSMENT — COGNITIVE AND FUNCTIONAL STATUS - GENERAL
TOILETING: A LOT
DAILY ACTIVITIY SCORE: 14
PERSONAL GROOMING: A LITTLE
EATING MEALS: A LITTLE
DAILY ACTIVITIY SCORE: 20
HELP NEEDED FOR BATHING: A LOT
SUGGESTED CMS G CODE MODIFIER DAILY ACTIVITY: CJ
MOVING TO AND FROM BED TO CHAIR: A LITTLE
WALKING IN HOSPITAL ROOM: A LITTLE
STANDING UP FROM CHAIR USING ARMS: A LITTLE
DRESSING REGULAR UPPER BODY CLOTHING: A LOT
DRESSING REGULAR UPPER BODY CLOTHING: A LOT
SUGGESTED CMS G CODE MODIFIER MOBILITY: CK
MOBILITY SCORE: 17
HELP NEEDED FOR BATHING: A LITTLE
DRESSING REGULAR LOWER BODY CLOTHING: A LITTLE
SUGGESTED CMS G CODE MODIFIER DAILY ACTIVITY: CK
MOVING FROM LYING ON BACK TO SITTING ON SIDE OF FLAT BED: A LITTLE
MOVING FROM LYING ON BACK TO SITTING ON SIDE OF FLAT BED: A LOT
CLIMB 3 TO 5 STEPS WITH RAILING: A LOT
CLIMB 3 TO 5 STEPS WITH RAILING: A LITTLE
DRESSING REGULAR LOWER BODY CLOTHING: A LOT
MOVING TO AND FROM BED TO CHAIR: A LITTLE
WALKING IN HOSPITAL ROOM: A LITTLE
STANDING UP FROM CHAIR USING ARMS: A LITTLE
SUGGESTED CMS G CODE MODIFIER MOBILITY: CK
MOBILITY SCORE: 15
TURNING FROM BACK TO SIDE WHILE IN FLAT BAD: A LOT
TURNING FROM BACK TO SIDE WHILE IN FLAT BAD: A LOT

## 2020-01-10 ASSESSMENT — LIFESTYLE VARIABLES
ALCOHOL_USE: NO
EVER FELT BAD OR GUILTY ABOUT YOUR DRINKING: NO
HAVE PEOPLE ANNOYED YOU BY CRITICIZING YOUR DRINKING: NO
TOTAL SCORE: 0
HOW MANY TIMES IN THE PAST YEAR HAVE YOU HAD 5 OR MORE DRINKS IN A DAY: 0
TOTAL SCORE: 0
TOTAL SCORE: 0
AVERAGE NUMBER OF DAYS PER WEEK YOU HAVE A DRINK CONTAINING ALCOHOL: 0
ON A TYPICAL DAY WHEN YOU DRINK ALCOHOL HOW MANY DRINKS DO YOU HAVE: 0
DOES PATIENT WANT TO STOP DRINKING: NO
SUBSTANCE_ABUSE: 0
HAVE YOU EVER FELT YOU SHOULD CUT DOWN ON YOUR DRINKING: NO
CONSUMPTION TOTAL: NEGATIVE
EVER HAD A DRINK FIRST THING IN THE MORNING TO STEADY YOUR NERVES TO GET RID OF A HANGOVER: NO

## 2020-01-10 ASSESSMENT — ENCOUNTER SYMPTOMS
TREMORS: 0
SPUTUM PRODUCTION: 0
COUGH: 0
SPEECH CHANGE: 0
WEAKNESS: 1
DOUBLE VISION: 1
HEARTBURN: 0
LOSS OF CONSCIOUSNESS: 0
WEIGHT LOSS: 0
EYE PAIN: 0
VOMITING: 0
ABDOMINAL PAIN: 0
BRUISES/BLEEDS EASILY: 0
BACK PAIN: 0
CLAUDICATION: 0
NECK PAIN: 0
CHILLS: 0
SENSORY CHANGE: 0
SEIZURES: 0
FOCAL WEAKNESS: 1
HEMOPTYSIS: 0
NAUSEA: 0
HEADACHES: 1
BLURRED VISION: 0
DIZZINESS: 0
PHOTOPHOBIA: 0
TINGLING: 0
FEVER: 0
PALPITATIONS: 0
MYALGIAS: 0
ORTHOPNEA: 0
DEPRESSION: 0
SINUS PAIN: 0

## 2020-01-10 ASSESSMENT — COPD QUESTIONNAIRES
IN THE PAST 12 MONTHS DO YOU DO LESS THAN YOU USED TO BECAUSE OF YOUR BREATHING PROBLEMS: DISAGREE/UNSURE
DO YOU EVER COUGH UP ANY MUCUS OR PHLEGM?: NO/ONLY WITH OCCASIONAL COLDS OR INFECTIONS
COPD SCREENING SCORE: 0
HAVE YOU SMOKED AT LEAST 100 CIGARETTES IN YOUR ENTIRE LIFE: NO/DON'T KNOW
DURING THE PAST 4 WEEKS HOW MUCH DID YOU FEEL SHORT OF BREATH: NONE/LITTLE OF THE TIME

## 2020-01-10 ASSESSMENT — PATIENT HEALTH QUESTIONNAIRE - PHQ9
2. FEELING DOWN, DEPRESSED, IRRITABLE, OR HOPELESS: NOT AT ALL
1. LITTLE INTEREST OR PLEASURE IN DOING THINGS: NOT AT ALL
SUM OF ALL RESPONSES TO PHQ9 QUESTIONS 1 AND 2: 0

## 2020-01-10 ASSESSMENT — GAIT ASSESSMENTS
ASSISTIVE DEVICE: FRONT WHEEL WALKER
GAIT LEVEL OF ASSIST: MINIMAL ASSIST
DISTANCE (FEET): 25

## 2020-01-10 ASSESSMENT — ACTIVITIES OF DAILY LIVING (ADL): TOILETING: INDEPENDENT

## 2020-01-10 NOTE — ASSESSMENT & PLAN NOTE
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

## 2020-01-10 NOTE — PROGRESS NOTES
[facility] Medicine Daily Progress Note    Date of Service  1/10/2020    Chief Complaint  24 y.o. female admitted 1/9/2020 with no significant past medical history is coming today complaining of upper and lower extremity weakness associated with diplopia, headaches and photophobia    Hospital Course    The patient went to the hospital her vital signs were within normal limits.  CT scan found a meningioma in the parasagittal left frontal vertex.      Interval Problem Update  1/10: Patient seen and examined by me today she has more upper extremity weakness than lower extremity weakness.  Hyperactive reflexes in the left lower extremity.  Waiting for MRI of the brain and spinal cord.  If negative I will get an LP.    Consultants/Specialty  None    Code Status  Full    Disposition  TBD    Review of Systems  Review of Systems   Constitutional: Negative for chills, fever and weight loss.   HENT: Negative for congestion, nosebleeds and sinus pain.    Eyes: Positive for double vision (2 to 3 weeks ago now resolved). Negative for blurred vision, photophobia and pain.   Respiratory: Negative for cough, hemoptysis and sputum production.    Cardiovascular: Negative for chest pain, palpitations, orthopnea and claudication.   Gastrointestinal: Negative for abdominal pain, heartburn, nausea and vomiting.   Genitourinary: Negative for dysuria, frequency and urgency.   Musculoskeletal: Negative for back pain, myalgias and neck pain.   Skin: Negative for itching and rash.   Neurological: Positive for focal weakness, weakness and headaches. Negative for dizziness, tingling, tremors, sensory change, speech change, seizures and loss of consciousness.   Endo/Heme/Allergies: Does not bruise/bleed easily.   Psychiatric/Behavioral: Negative for depression, substance abuse and suicidal ideas.        Physical Exam  Temp:  [36.2 °C (97.2 °F)-37.4 °C (99.3 °F)] 37.4 °C (99.3 °F)  Pulse:  [60-96] 72  Resp:  [14-18] 16  BP: (110-138)/(76-96)  110/77  SpO2:  [90 %-98 %] 94 %    Physical Exam  Vitals signs and nursing note reviewed.   Constitutional:       General: She is not in acute distress.     Appearance: Normal appearance. She is not ill-appearing.   HENT:      Head: Normocephalic and atraumatic.      Nose: Nose normal. No congestion or rhinorrhea.      Mouth/Throat:      Pharynx: Oropharynx is clear.   Eyes:      General: No scleral icterus.        Right eye: No discharge.         Left eye: No discharge.      Extraocular Movements: Extraocular movements intact.      Conjunctiva/sclera: Conjunctivae normal.      Pupils: Pupils are equal, round, and reactive to light.   Neck:      Musculoskeletal: Normal range of motion and neck supple. No neck rigidity or muscular tenderness.   Cardiovascular:      Rate and Rhythm: Normal rate and regular rhythm.      Pulses: Normal pulses.   Pulmonary:      Effort: Pulmonary effort is normal. No respiratory distress.      Breath sounds: Normal breath sounds. No wheezing or rales.   Abdominal:      General: Bowel sounds are normal. There is no distension.      Palpations: Abdomen is soft.      Tenderness: There is no tenderness. There is no guarding.   Musculoskeletal: Normal range of motion.         General: No swelling or deformity.   Skin:     General: Skin is warm and dry.      Coloration: Skin is not jaundiced.   Neurological:      General: No focal deficit present.      Mental Status: She is alert and oriented to person, place, and time.      Cranial Nerves: No cranial nerve deficit.      Sensory: No sensory deficit.      Motor: Weakness (4/5 all 4 extremities.) present.      Deep Tendon Reflexes: Reflexes abnormal.   Psychiatric:         Mood and Affect: Mood normal.         Behavior: Behavior normal.         Fluids    Intake/Output Summary (Last 24 hours) at 1/10/2020 1122  Last data filed at 1/9/2020 2300  Gross per 24 hour   Intake 150 ml   Output --   Net 150 ml       Laboratory  Recent Labs      01/09/20  1525 01/10/20  0426   WBC 7.8 7.3   RBC 6.31* 5.50*   HEMOGLOBIN 17.7* 15.4   HEMATOCRIT 51.8* 45.9   MCV 82.1 83.5   MCH 28.1 28.0   MCHC 34.2 33.6   RDW 39.1 39.7   PLATELETCT 396 342   MPV 9.3 9.2     Recent Labs     01/09/20  1525 01/10/20  0426   SODIUM 139 141   POTASSIUM 3.9 3.7   CHLORIDE 104 106   CO2 23 25   GLUCOSE 73 78   BUN 21 19   CREATININE 0.95 0.99   CALCIUM 10.3 9.2             Recent Labs     01/10/20  0426   TRIGLYCERIDE 44   HDL 40   LDL 80       Imaging  CT-HEAD W/O   Final Result      1. No acute intracranial abnormality.   2. 2 x 1.5 x 1.7 cm extra-axial calcified lesion at the parasagittal left frontal vertex is nonspecific, possibly representing an ossified meningioma.      MR-BRAIN-WITH & W/O    (Results Pending)   MR-LUMBAR SPINE-WITH & W/O    (Results Pending)   MR-THORACIC SPINE-WITH & W/O    (Results Pending)   MR-CERVICAL SPINE-WITH & W/O    (Results Pending)        Assessment/Plan  Vitamin D deficiency  Assessment & Plan  Had vitamin D deficiency in the past and I will recheck this    Meningioma (HCC)- (present on admission)  Assessment & Plan  CT head showed 2 x 1.5 x 1.7 cm extra-axial calcified lesion at the parasagittal left frontal vertex is nonspecific, possibly representing an ossified meningioma, will get MRI to better clarify this finding although looks unlikely to be the source of patient's complaints.    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

## 2020-01-10 NOTE — ASSESSMENT & PLAN NOTE
CT head showed 2 x 1.5 x 1.7 cm extra-axial calcified lesion at the parasagittal left frontal vertex is nonspecific, possibly representing an ossified meningioma, will get MRI to better clarify this finding although looks unlikely to be the source of patient's complaints.

## 2020-01-10 NOTE — CARE PLAN
Problem: Communication  Goal: The ability to communicate needs accurately and effectively will improve  Outcome: PROGRESSING AS EXPECTED     Problem: Safety  Goal: Will remain free from injury  Outcome: PROGRESSING AS EXPECTED  Goal: Will remain free from falls  Outcome: PROGRESSING AS EXPECTED     Problem: Infection  Goal: Will remain free from infection  Outcome: PROGRESSING AS EXPECTED     Problem: Venous Thromboembolism (VTW)/Deep Vein Thrombosis (DVT) Prevention:  Goal: Patient will participate in Venous Thrombosis (VTE)/Deep Vein Thrombosis (DVT)Prevention Measures  Outcome: PROGRESSING AS EXPECTED     Problem: Bowel/Gastric:  Goal: Normal bowel function is maintained or improved  Outcome: PROGRESSING AS EXPECTED  Goal: Will not experience complications related to bowel motility  Outcome: PROGRESSING AS EXPECTED     Problem: Knowledge Deficit  Goal: Knowledge of disease process/condition, treatment plan, diagnostic tests, and medications will improve  Outcome: PROGRESSING AS EXPECTED  Goal: Knowledge of the prescribed therapeutic regimen will improve  Outcome: PROGRESSING AS EXPECTED     Problem: Discharge Barriers/Planning  Goal: Patient's continuum of care needs will be met  Outcome: PROGRESSING AS EXPECTED     Problem: Respiratory:  Goal: Respiratory status will improve  Outcome: PROGRESSING AS EXPECTED

## 2020-01-10 NOTE — DISCHARGE PLANNING
Anticipated Discharge Disposition:   Home with help from spouse    Action:    Patient with weakness in upper and lower extremities.  LP pending.    Pt drowsy, stated she lives with her spouse in first level apartment in [place].  She does not use DME and has been independent with ADLs and IADLs.    Barriers to Discharge:    Medical clearance    Plan:    Review OT/PT evaluations.    Care Transition Team Assessment    Information Source  Orientation : Oriented x 4  Information Given By: Patient  Who is responsible for making decisions for patient? : Patient    Readmission Evaluation  Is this a readmission?: No    Elopement Risk  Legal Hold: No  Ambulatory or Self Mobile in Wheelchair: No-Not an Elopement Risk  Elopement Risk: Not at Risk for Elopement    Interdisciplinary Discharge Planning  Patient or legal guardian wants to designate a caregiver (see row info): No    Discharge Preparedness  What is your plan after discharge?: Uncertain - pending medical team collaboration  Prior Functional Level: Ambulatory, Drives Self, Independent with Activities of Daily Living, Independent with Medication Management  Difficulity with ADLs: Bathing, Dressing, Walking  Difficulity with IADLs: Cooking, Driving, Laundry, Shopping    Functional Assesment  Prior Functional Level: Ambulatory, Drives Self, Independent with Activities of Daily Living, Independent with Medication Management    Finances  Financial Barriers to Discharge: No  Prescription Coverage: Yes    Vision / Hearing Impairment  Vision Impairment : No  Hearing Impairment : No         Advance Directive  Advance Directive?: None    Domestic Abuse  Have you ever been the victim of abuse or violence?: No  Physical Abuse or Sexual Abuse: No  Verbal Abuse or Emotional Abuse: No  Possible Abuse Reported to:: Not Applicable         Discharge Risks or Barriers  Discharge risks or barriers?: No    Anticipated Discharge Information  Anticipated discharge disposition: Home  Discharge  Address: ([address]  [place] [address])  Discharge Contact Phone Number: [phone]

## 2020-01-10 NOTE — PROGRESS NOTES
2 RN skin check complete. Pt. Has small bruise and scar to right knee. Pt.'s skin is otherwise clean, dry and intact.

## 2020-01-10 NOTE — THERAPY
"Physical Therapy Evaluation completed.   Bed Mobility:  Supine to Sit: Moderate Assist  Transfers: Sit to Stand: Minimal Assist  Gait: Level Of Assist: Minimal Assist with Front-Wheel Walker       Plan of Care: Will benefit from Physical Therapy 3 times per week  Discharge Recommendations: Equipment: Will Continue to Assess for Equipment Needs. Post-acute therapy Discharge to a transitional care facility for continued skilled therapy services VS Discharge to home with outpatient or home health for additional skilled therapy services.    Pt presents with c/o 4 days of weakness. Today, pt struggles most with rolling in bed, trying to reach for rails, needs mod assist for this. Pt's LE strength tests at 5/5 B and coordination is intact. Pt is min A for ambulation x 25 feet, did not go into hallway due to light sensitive. Pt lives with spouse who works. PT will follow to assess for d/c recommendation and progress as able.     See \"Rehab Therapy-Acute\" Patient Summary Report for complete documentation.     "

## 2020-01-10 NOTE — CARE PLAN
Problem: Communication  Goal: The ability to communicate needs accurately and effectively will improve  Outcome: PROGRESSING AS EXPECTED  Note:   Pt. Is A&O x 4. Follows commands and responds appropriately. Will continue to round hourly and encourage the Pt. To ask questions and voice feelings.            Problem: Safety  Goal: Will remain free from falls  Outcome: PROGRESSING AS EXPECTED  Note:   Pt. Is a one person assist, with BUE weakness. Q 2 turns in place Room has adequate lighting, is free of clutter, call light is within reach, and bed is locked and in the lowest position. Will continue to hourly round.

## 2020-01-10 NOTE — THERAPY
"Occupational Therapy Evaluation completed.   Functional Status: Pt presents to skilled OT services following c/o Upper and lower extremity weakness associated with diplopia, HA and photophobia, CT revealed meningioma in the parasagittal left frontal vertex, pending MRI this am. Pt performed bed mobility with mod a, LB dressing mod a, F balance seated eob. RUE 5/5, LUE 4-<>4+ with questionable hyperreflexia, ambulated short distance in room with HHA of 2 with min a, distance limited d/t fatigue and light sensitivity, coordination and sensation intact with formal testing. Pt will benefit from acute skilled OT services while in house and depending on pt's progression with therapy and medical needs will dictate safe d/c disposition. Will continue to monitor.   Plan of Care: Will benefit from Occupational Therapy 3 times per week  Discharge Recommendations:  Equipment: Will Continue to Assess for Equipment Needs. Post-acute therapy Recommend post-acute placement for additional occupational therapy services prior to discharge home; pending progress with therapy.       See \"Rehab Therapy-Acute\" Patient Summary Report for complete documentation.    "

## 2020-01-10 NOTE — H&P
[facility] Medicine History & Physical Note    Date of Service  1/9/2020    Primary Care Physician  Juan Crabtree D.O.    Consultants  None    Code Status  Full code    Chief Complaint  Upper and lower extremity weakness    History of Presenting Illness  24 y.o. female who presented 1/9/2020 with no significant past medical history is coming today complaining of upper and lower extremity weakness, for the last 4 days, patient said that started on her arms and then moved to her legs, patient stated that she has been having problem walking because she feels that her legs are going to give up, patient denies any numbness or tingling, her sensation is intact in her upper and lower extremities, denies any rash, no recent diarrhea or viral infection, no ill contacts, no recent travel, patient had double vision 2 to 3 weeks ago but now is resolved, she denies any headache dizziness lightheadedness, no slurred speech no coughing shortness of breath no adenopathy, patient had CT head in the emergency room that showed2 x 1.5 x 1.7 cm extra-axial calcified lesion at the parasagittal left frontal vertex is nonspecific, possibly representing an ossified meningioma which most likely is not the cause of her symptoms, her initial blood work is unremarkable, when I saw the patient she is in the emergency room she is alert oriented follows commands she is able to give good history, will admit patient for observation, plan is to get MRI of the brain and MRI spine there is concern for possible multiple sclerosis due to patient findings and then progressing to extremity weakness, patient denies any history of neurological problems in her family or rheumatologic problems, plan of care was discussed with patient and she agree, all questions answered.    Review of Systems  Review of Systems   Constitutional: Negative for chills, fever and weight loss.   HENT: Negative for congestion, nosebleeds and sinus pain.    Eyes: Positive for double  vision (2 to 3 weeks ago now resolved). Negative for blurred vision, photophobia and pain.   Respiratory: Negative for cough, hemoptysis and sputum production.    Cardiovascular: Negative for chest pain, palpitations, orthopnea and claudication.   Gastrointestinal: Negative for abdominal pain, heartburn, nausea and vomiting.   Genitourinary: Negative for dysuria, frequency and urgency.   Musculoskeletal: Negative for back pain, myalgias and neck pain.   Skin: Negative for itching and rash.   Neurological: Negative for dizziness, tingling and headaches.   Endo/Heme/Allergies: Does not bruise/bleed easily.   Psychiatric/Behavioral: Negative for depression, substance abuse and suicidal ideas.       Past Medical History  No past medical history    Surgical History  No recent surgeries    Family History  No neurological disease, no rheumatological disease, no diabetes or heart problems.    Social History   reports that she has never smoked. She has never used smokeless tobacco. She reports that she does not drink alcohol or use drugs.    Allergies  No Known Allergies    Medications  None       Physical Exam  Temp:  [36.2 °C (97.2 °F)] 36.2 °C (97.2 °F)  Pulse:  [75-96] 75  Resp:  [16] 16  BP: (130-131)/(83-96) 130/83  SpO2:  [90 %-97 %] 97 %    Physical Exam  Vitals signs and nursing note reviewed.   Constitutional:       General: She is not in acute distress.     Appearance: Normal appearance. She is not ill-appearing.   HENT:      Head: Normocephalic and atraumatic.      Nose: Nose normal. No congestion or rhinorrhea.      Mouth/Throat:      Pharynx: Oropharynx is clear.   Eyes:      General: No scleral icterus.        Right eye: No discharge.         Left eye: No discharge.      Extraocular Movements: Extraocular movements intact.      Conjunctiva/sclera: Conjunctivae normal.      Pupils: Pupils are equal, round, and reactive to light.   Neck:      Musculoskeletal: Normal range of motion and neck supple. No neck  rigidity or muscular tenderness.   Cardiovascular:      Rate and Rhythm: Normal rate and regular rhythm.      Pulses: Normal pulses.   Pulmonary:      Effort: Pulmonary effort is normal. No respiratory distress.      Breath sounds: Normal breath sounds. No wheezing or rales.   Abdominal:      General: Bowel sounds are normal. There is no distension.      Palpations: Abdomen is soft.      Tenderness: There is no tenderness. There is no guarding.   Musculoskeletal: Normal range of motion.         General: No swelling or deformity.   Skin:     General: Skin is warm and dry.      Coloration: Skin is not jaundiced.   Neurological:      General: No focal deficit present.      Mental Status: She is alert and oriented to person, place, and time.      Cranial Nerves: No cranial nerve deficit.      Sensory: No sensory deficit.      Motor: Weakness (4/5 all 4 extremities.) present.   Psychiatric:         Mood and Affect: Mood normal.         Behavior: Behavior normal.         Laboratory:  Recent Labs     01/09/20  1525   WBC 7.8   RBC 6.31*   HEMOGLOBIN 17.7*   HEMATOCRIT 51.8*   MCV 82.1   MCH 28.1   MCHC 34.2   RDW 39.1   PLATELETCT 396   MPV 9.3     Recent Labs     01/09/20  1525   SODIUM 139   POTASSIUM 3.9   CHLORIDE 104   CO2 23   GLUCOSE 73   BUN 21   CREATININE 0.95   CALCIUM 10.3     Recent Labs     01/09/20  1525   ALTSGPT 10   ASTSGOT 15   ALKPHOSPHAT 86   TBILIRUBIN 0.7   GLUCOSE 73         No results for input(s): NTPROBNP in the last 72 hours.      No results for input(s): TROPONINT in the last 72 hours.    Urinalysis:    No results found     Imaging:  CT-HEAD W/O   Final Result      1. No acute intracranial abnormality.   2. 2 x 1.5 x 1.7 cm extra-axial calcified lesion at the parasagittal left frontal vertex is nonspecific, possibly representing an ossified meningioma.      MR-BRAIN-WITH & W/O    (Results Pending)   MR-LUMBAR SPINE-WITH & W/O    (Results Pending)   MR-THORACIC SPINE-WITH & W/O    (Results  Pending)   MR-CERVICAL SPINE-WITH & W/O    (Results Pending)         Assessment/Plan:  I anticipate this patient is appropriate for observation status at this time.    Meningioma (HCC)- (present on admission)  Assessment & Plan  CT head showed 2 x 1.5 x 1.7 cm extra-axial calcified lesion at the parasagittal left frontal vertex is nonspecific, possibly representing an ossified meningioma, will get MRI to better clarify this finding although looks unlikely to be the source of patient's complaints.    Weakness- (present on admission)  Assessment & Plan  Concern for multiple sclerosis, since patient had double vision 2 to 3 weeks ago and now developing upper and lower extremity weakness, will get MRI brain, and spine.  PT OT evaluation, pending on MRI findings will consider neuro consultation and lumbar puncture.      VTE prophylaxis: SCDs

## 2020-01-11 LAB
APPEARANCE UR: ABNORMAL
BACTERIA #/AREA URNS HPF: ABNORMAL /HPF
BILIRUB UR QL STRIP.AUTO: NEGATIVE
BURR CELLS/RBC NFR CSF MANUAL: 0 %
CLARITY CSF: CLEAR
COLOR CSF: COLORLESS
COLOR SPUN CSF: COLORLESS
COLOR UR: YELLOW
EPI CELLS #/AREA URNS HPF: ABNORMAL /HPF
GLUCOSE CSF-MCNC: 56 MG/DL (ref 40–80)
GLUCOSE UR STRIP.AUTO-MCNC: NEGATIVE MG/DL
GRAM STN SPEC: NORMAL
HYALINE CASTS #/AREA URNS LPF: ABNORMAL /LPF
INR PPP: 1.08 (ref 0.87–1.13)
KETONES UR STRIP.AUTO-MCNC: NEGATIVE MG/DL
LEUKOCYTE ESTERASE UR QL STRIP.AUTO: ABNORMAL
LYMPHOCYTES NFR CSF: 91 %
MICRO URNS: ABNORMAL
MONONUC CELLS NFR CSF: 9 %
NITRITE UR QL STRIP.AUTO: POSITIVE
NUCLEAR IGG SER QL IA: DETECTED
PH UR STRIP.AUTO: 5.5 [PH] (ref 5–8)
PROT CSF-MCNC: 25 MG/DL (ref 15–45)
PROT UR QL STRIP: NEGATIVE MG/DL
PROTHROMBIN TIME: 14.3 SEC (ref 12–14.6)
RBC # CSF: 1 CELLS/UL
RBC # URNS HPF: ABNORMAL /HPF
RBC UR QL AUTO: ABNORMAL
SIGNIFICANT IND 70042: NORMAL
SITE SITE: NORMAL
SOURCE SOURCE: NORMAL
SP GR UR STRIP.AUTO: 1.02
SPECIMEN VOL CSF: 11 ML
TUBE # CSF: 3
TUBE # CSF: 4
UROBILINOGEN UR STRIP.AUTO-MCNC: 0.2 MG/DL
WBC # CSF: 1 CELLS/UL (ref 0–10)
WBC #/AREA URNS HPF: ABNORMAL /HPF

## 2020-01-11 PROCEDURE — 700105 HCHG RX REV CODE 258: Performed by: HOSPITALIST

## 2020-01-11 PROCEDURE — 87186 SC STD MICRODIL/AGAR DIL: CPT

## 2020-01-11 PROCEDURE — 87077 CULTURE AEROBIC IDENTIFY: CPT

## 2020-01-11 PROCEDURE — 62270 DX LMBR SPI PNXR: CPT | Performed by: INTERNAL MEDICINE

## 2020-01-11 PROCEDURE — 84157 ASSAY OF PROTEIN OTHER: CPT

## 2020-01-11 PROCEDURE — 99232 SBSQ HOSP IP/OBS MODERATE 35: CPT | Performed by: HOSPITALIST

## 2020-01-11 PROCEDURE — 87798 DETECT AGENT NOS DNA AMP: CPT

## 2020-01-11 PROCEDURE — 82945 GLUCOSE OTHER FLUID: CPT

## 2020-01-11 PROCEDURE — 85610 PROTHROMBIN TIME: CPT

## 2020-01-11 PROCEDURE — 82784 ASSAY IGA/IGD/IGG/IGM EACH: CPT

## 2020-01-11 PROCEDURE — 81001 URINALYSIS AUTO W/SCOPE: CPT

## 2020-01-11 PROCEDURE — 83916 OLIGOCLONAL BANDS: CPT

## 2020-01-11 PROCEDURE — A9270 NON-COVERED ITEM OR SERVICE: HCPCS | Performed by: HOSPITALIST

## 2020-01-11 PROCEDURE — 86592 SYPHILIS TEST NON-TREP QUAL: CPT

## 2020-01-11 PROCEDURE — 700102 HCHG RX REV CODE 250 W/ 637 OVERRIDE(OP): Performed by: HOSPITALIST

## 2020-01-11 PROCEDURE — 700101 HCHG RX REV CODE 250: Performed by: NURSE PRACTITIONER

## 2020-01-11 PROCEDURE — 89051 BODY FLUID CELL COUNT: CPT

## 2020-01-11 PROCEDURE — 009U3ZX DRAINAGE OF SPINAL CANAL, PERCUTANEOUS APPROACH, DIAGNOSTIC: ICD-10-PCS | Performed by: INTERNAL MEDICINE

## 2020-01-11 PROCEDURE — 36415 COLL VENOUS BLD VENIPUNCTURE: CPT

## 2020-01-11 PROCEDURE — 87205 SMEAR GRAM STAIN: CPT

## 2020-01-11 PROCEDURE — 86694 HERPES SIMPLEX NES ANTBDY: CPT

## 2020-01-11 PROCEDURE — 770006 HCHG ROOM/CARE - MED/SURG/GYN SEMI*

## 2020-01-11 PROCEDURE — 87086 URINE CULTURE/COLONY COUNT: CPT

## 2020-01-11 PROCEDURE — 87070 CULTURE OTHR SPECIMN AEROBIC: CPT

## 2020-01-11 PROCEDURE — 62270 DX LMBR SPI PNXR: CPT

## 2020-01-11 RX ORDER — ERGOCALCIFEROL 1.25 MG/1
50000 CAPSULE ORAL
Status: DISCONTINUED | OUTPATIENT
Start: 2020-01-11 | End: 2020-01-12 | Stop reason: HOSPADM

## 2020-01-11 RX ADMIN — ACETAMINOPHEN 650 MG: 325 TABLET, FILM COATED ORAL at 22:23

## 2020-01-11 RX ADMIN — LIDOCAINE HYDROCHLORIDE 20 ML: 10 INJECTION, SOLUTION INFILTRATION; PERINEURAL at 11:00

## 2020-01-11 RX ADMIN — SODIUM CHLORIDE 1000 ML: 9 INJECTION, SOLUTION INTRAVENOUS at 20:04

## 2020-01-11 ASSESSMENT — ENCOUNTER SYMPTOMS
WEIGHT LOSS: 0
PHOTOPHOBIA: 0
FOCAL WEAKNESS: 1
CLAUDICATION: 0
PALPITATIONS: 0
HEADACHES: 1
DIZZINESS: 0
NAUSEA: 0
HEMOPTYSIS: 0
FEVER: 0
BACK PAIN: 0
WEAKNESS: 1
BLURRED VISION: 0
SENSORY CHANGE: 0
ABDOMINAL PAIN: 0
TREMORS: 0
DOUBLE VISION: 1
LOSS OF CONSCIOUSNESS: 0
SPEECH CHANGE: 0
SEIZURES: 0
CHILLS: 0
HEARTBURN: 0
SPUTUM PRODUCTION: 0
TINGLING: 0
SINUS PAIN: 0
MYALGIAS: 0
COUGH: 0
VOMITING: 0
BRUISES/BLEEDS EASILY: 0
DEPRESSION: 0
NECK PAIN: 0
EYE PAIN: 0
ORTHOPNEA: 0

## 2020-01-11 ASSESSMENT — PATIENT HEALTH QUESTIONNAIRE - PHQ9
1. LITTLE INTEREST OR PLEASURE IN DOING THINGS: NOT AT ALL
SUM OF ALL RESPONSES TO PHQ9 QUESTIONS 1 AND 2: 0
2. FEELING DOWN, DEPRESSED, IRRITABLE, OR HOPELESS: NOT AT ALL

## 2020-01-11 ASSESSMENT — LIFESTYLE VARIABLES: SUBSTANCE_ABUSE: 0

## 2020-01-11 NOTE — PROCEDURES
Procedure Lumbar Puncture  Date/Time: 1/11/2020 3:06 PM  Performed by: Bruce Turner M.D.  Authorized by: Bruce Turner M.D.     Consent:     Consent obtained:  Verbal    Consent given by:  Patient    Risks discussed:  Bleeding, infection, pain, headache, nerve damage and repeat procedure    Alternatives discussed:  No treatment and delayed treatment  Pre-procedure details:     Procedure purpose:  Diagnostic    Preparation: Patient was prepped and draped in usual sterile fashion    Sedation:     Sedation type:  None  Anesthesia:     Anesthesia method:  None  Procedure details:     Lumbar space:  L4-L5 interspace    Patient position:  L lateral decubitus    Needle gauge:  20    Needle type:  Spinal needle - Quincke tip    Needle length (in):  3.5    Ultrasound guidance: no      Number of attempts:  1    Fluid appearance:  Clear    Tubes of fluid:  4    Total volume (ml):  11  Post-procedure:     Puncture site:  Adhesive bandage applied    Patient tolerance of procedure:  Tolerated well, no immediate complications  Comments:      Informed verbal consent obtained.  Procedure completed.    CSF laboratory testing per primary team, they were informed.

## 2020-01-11 NOTE — PROGRESS NOTES
[facility] Medicine Daily Progress Note    Date of Service  1/11/2020    Chief Complaint  24 y.o. female admitted 1/9/2020 with no significant past medical history is coming today complaining of upper and lower extremity weakness associated with diplopia, headaches and photophobia    Hospital Course    The patient went to the hospital her vital signs were within normal limits.  CT scan found a meningioma in the parasagittal left frontal vertex.      Interval Problem Update  1/10: Patient seen and examined by me today she has more upper extremity weakness than lower extremity weakness.  Hyperactive reflexes in the left lower extremity.  Waiting for MRI of the brain and spinal cord.  If negative I will get an LP.  1/11: Patient was seen and examined by me today.  She states that her muscle weakness is still persistent especially in the upper extremities.  I will order an LP to rule out meningitis.  Consultants/Specialty  None    Code Status  Full    Disposition  TBD    Review of Systems  Review of Systems   Constitutional: Negative for chills, fever and weight loss.   HENT: Negative for congestion, nosebleeds and sinus pain.    Eyes: Positive for double vision (2 to 3 weeks ago now resolved). Negative for blurred vision, photophobia and pain.   Respiratory: Negative for cough, hemoptysis and sputum production.    Cardiovascular: Negative for chest pain, palpitations, orthopnea and claudication.   Gastrointestinal: Negative for abdominal pain, heartburn, nausea and vomiting.   Genitourinary: Negative for dysuria, frequency and urgency.   Musculoskeletal: Negative for back pain, myalgias and neck pain.   Skin: Negative for itching and rash.   Neurological: Positive for focal weakness, weakness and headaches. Negative for dizziness, tingling, tremors, sensory change, speech change, seizures and loss of consciousness.   Endo/Heme/Allergies: Does not bruise/bleed easily.   Psychiatric/Behavioral: Negative for depression,  substance abuse and suicidal ideas.        Physical Exam  Temp:  [36.1 °C (97 °F)-37.2 °C (99 °F)] 36.7 °C (98 °F)  Pulse:  [67-83] 67  Resp:  [16] 16  BP: (114-129)/(78-90) 121/82  SpO2:  [92 %-96 %] 96 %    Physical Exam  Vitals signs and nursing note reviewed.   Constitutional:       General: She is not in acute distress.     Appearance: Normal appearance. She is not ill-appearing.   HENT:      Head: Normocephalic and atraumatic.      Nose: Nose normal. No congestion or rhinorrhea.      Mouth/Throat:      Pharynx: Oropharynx is clear.   Eyes:      General: No scleral icterus.        Right eye: No discharge.         Left eye: No discharge.      Extraocular Movements: Extraocular movements intact.      Conjunctiva/sclera: Conjunctivae normal.      Pupils: Pupils are equal, round, and reactive to light.   Neck:      Musculoskeletal: Normal range of motion and neck supple. No neck rigidity or muscular tenderness.   Cardiovascular:      Rate and Rhythm: Normal rate and regular rhythm.      Pulses: Normal pulses.   Pulmonary:      Effort: Pulmonary effort is normal. No respiratory distress.      Breath sounds: Normal breath sounds. No wheezing or rales.   Abdominal:      General: Bowel sounds are normal. There is no distension.      Palpations: Abdomen is soft.      Tenderness: There is no tenderness. There is no guarding.   Musculoskeletal: Normal range of motion.         General: No swelling or deformity.   Skin:     General: Skin is warm and dry.      Coloration: Skin is not jaundiced.   Neurological:      General: No focal deficit present.      Mental Status: She is alert and oriented to person, place, and time.      Cranial Nerves: No cranial nerve deficit.      Sensory: No sensory deficit.      Motor: Weakness (4/5 all 4 extremities.) present.      Deep Tendon Reflexes: Reflexes abnormal.   Psychiatric:         Mood and Affect: Mood normal.         Behavior: Behavior normal.         Fluids    Intake/Output  Summary (Last 24 hours) at 1/11/2020 1130  Last data filed at 1/11/2020 0600  Gross per 24 hour   Intake 1547.5 ml   Output 200 ml   Net 1347.5 ml       Laboratory  Recent Labs     01/09/20  1525 01/10/20  0426   WBC 7.8 7.3   RBC 6.31* 5.50*   HEMOGLOBIN 17.7* 15.4   HEMATOCRIT 51.8* 45.9   MCV 82.1 83.5   MCH 28.1 28.0   MCHC 34.2 33.6   RDW 39.1 39.7   PLATELETCT 396 342   MPV 9.3 9.2     Recent Labs     01/09/20  1525 01/10/20  0426   SODIUM 139 141   POTASSIUM 3.9 3.7   CHLORIDE 104 106   CO2 23 25   GLUCOSE 73 78   BUN 21 19   CREATININE 0.95 0.99   CALCIUM 10.3 9.2             Recent Labs     01/10/20  0426   TRIGLYCERIDE 44   HDL 40   LDL 80       Imaging  MR-THORACIC SPINE-WITH & W/O   Final Result      MRI OF THE THORACIC SPINE WITHOUT AND WITH CONTRAST WITHIN NORMAL LIMITS.      MR-LUMBAR SPINE-WITH & W/O   Final Result      1.  L4-5 mild central disc bulging with underlying right paramedian annular fissure. No central or foraminal stenosis.   2.  L5-S1 borderline left foraminal stenosis.   3.  No evidence of demyelinating lesion in the distal cord/conus and no abnormal enhancement along the nerve roots of the cauda equina. No imaging findings are demonstrated suggestive of demyelinating disease or Guillan-Sweeden syndrome.      MR-CERVICAL SPINE-WITH & W/O   Final Result      1. MRI OF THE CERVICAL SPINE WITHOUT AND WITH CONTRAST WITHIN NORMAL LIMITS. NO EVIDENCE OF DEMYELINATING DISEASE IN THE CERVICAL SPINAL CORD.      MR-BRAIN-WITH & W/O   Final Result      1.  Incidental 25 mm calcific/ossific lesion at the left frontal vertex consistent with an entirely calcified meningioma or inner table osteoma. No associated enhancing soft tissue component. Doubtful significance.   2.  Otherwise, unremarkable MRI of the brain without and with contrast. No evidence of acute infarction or hemorrhage.      CT-HEAD W/O   Final Result      1. No acute intracranial abnormality.   2. 2 x 1.5 x 1.7 cm extra-axial calcified  lesion at the parasagittal left frontal vertex is nonspecific, possibly representing an ossified meningioma.           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       VTE prophylaxis:SCD

## 2020-01-12 ENCOUNTER — HOME HEALTH ADMISSION (OUTPATIENT)
Dept: HOME HEALTH SERVICES | Facility: HOME HEALTHCARE | Age: 25
End: 2020-01-12
Payer: COMMERCIAL

## 2020-01-12 VITALS
OXYGEN SATURATION: 96 % | DIASTOLIC BLOOD PRESSURE: 65 MMHG | BODY MASS INDEX: 27.4 KG/M2 | WEIGHT: 191.36 LBS | TEMPERATURE: 98.1 F | RESPIRATION RATE: 16 BRPM | SYSTOLIC BLOOD PRESSURE: 110 MMHG | HEIGHT: 70 IN | HEART RATE: 72 BPM

## 2020-01-12 LAB — CK SERPL-CCNC: 32 U/L (ref 0–154)

## 2020-01-12 PROCEDURE — 700102 HCHG RX REV CODE 250 W/ 637 OVERRIDE(OP): Performed by: HOSPITALIST

## 2020-01-12 PROCEDURE — 97535 SELF CARE MNGMENT TRAINING: CPT

## 2020-01-12 PROCEDURE — 700105 HCHG RX REV CODE 258: Performed by: HOSPITALIST

## 2020-01-12 PROCEDURE — 36415 COLL VENOUS BLD VENIPUNCTURE: CPT

## 2020-01-12 PROCEDURE — A9270 NON-COVERED ITEM OR SERVICE: HCPCS | Performed by: HOSPITALIST

## 2020-01-12 PROCEDURE — 700111 HCHG RX REV CODE 636 W/ 250 OVERRIDE (IP): Performed by: HOSPITALIST

## 2020-01-12 PROCEDURE — 99239 HOSP IP/OBS DSCHRG MGMT >30: CPT | Performed by: HOSPITALIST

## 2020-01-12 PROCEDURE — 82550 ASSAY OF CK (CPK): CPT

## 2020-01-12 RX ORDER — NITROFURANTOIN 25; 75 MG/1; MG/1
100 CAPSULE ORAL 2 TIMES DAILY WITH MEALS
Qty: 10 CAP | Refills: 0 | Status: SHIPPED | OUTPATIENT
Start: 2020-01-12 | End: 2020-01-17

## 2020-01-12 RX ORDER — METOCLOPRAMIDE HYDROCHLORIDE 5 MG/ML
10 INJECTION INTRAMUSCULAR; INTRAVENOUS ONCE
Status: COMPLETED | OUTPATIENT
Start: 2020-01-12 | End: 2020-01-12

## 2020-01-12 RX ORDER — NITROFURANTOIN 25; 75 MG/1; MG/1
100 CAPSULE ORAL 2 TIMES DAILY WITH MEALS
Status: DISCONTINUED | OUTPATIENT
Start: 2020-01-12 | End: 2020-01-12 | Stop reason: HOSPADM

## 2020-01-12 RX ORDER — SUMATRIPTAN 6 MG/.5ML
6 INJECTION, SOLUTION SUBCUTANEOUS ONCE
Status: COMPLETED | OUTPATIENT
Start: 2020-01-12 | End: 2020-01-12

## 2020-01-12 RX ORDER — SODIUM CHLORIDE 9 MG/ML
500 INJECTION, SOLUTION INTRAVENOUS ONCE
Status: COMPLETED | OUTPATIENT
Start: 2020-01-12 | End: 2020-01-12

## 2020-01-12 RX ORDER — KETOROLAC TROMETHAMINE 30 MG/ML
30 INJECTION, SOLUTION INTRAMUSCULAR; INTRAVENOUS EVERY 6 HOURS PRN
Status: DISCONTINUED | OUTPATIENT
Start: 2020-01-12 | End: 2020-01-12 | Stop reason: HOSPADM

## 2020-01-12 RX ADMIN — SODIUM CHLORIDE 500 ML: 9 INJECTION, SOLUTION INTRAVENOUS at 16:31

## 2020-01-12 RX ADMIN — NITROFURANTOIN MONOHYDRATE AND NITROFURANTOIN MACROCRYSTALLINE 100 MG: 75; 25 CAPSULE ORAL at 17:30

## 2020-01-12 RX ADMIN — ACETAMINOPHEN 650 MG: 325 TABLET, FILM COATED ORAL at 10:54

## 2020-01-12 RX ADMIN — METOCLOPRAMIDE 10 MG: 5 INJECTION, SOLUTION INTRAMUSCULAR; INTRAVENOUS at 16:29

## 2020-01-12 RX ADMIN — ERGOCALCIFEROL 50000 UNITS: 1.25 CAPSULE ORAL at 16:30

## 2020-01-12 RX ADMIN — SUMATRIPTAN 6 MG: 6 INJECTION, SOLUTION SUBCUTANEOUS at 16:28

## 2020-01-12 ASSESSMENT — COGNITIVE AND FUNCTIONAL STATUS - GENERAL
DAILY ACTIVITIY SCORE: 21
SUGGESTED CMS G CODE MODIFIER DAILY ACTIVITY: CJ
HELP NEEDED FOR BATHING: A LITTLE
TOILETING: A LITTLE
DRESSING REGULAR LOWER BODY CLOTHING: A LITTLE

## 2020-01-12 ASSESSMENT — PATIENT HEALTH QUESTIONNAIRE - PHQ9
2. FEELING DOWN, DEPRESSED, IRRITABLE, OR HOPELESS: NOT AT ALL
SUM OF ALL RESPONSES TO PHQ9 QUESTIONS 1 AND 2: 0
1. LITTLE INTEREST OR PLEASURE IN DOING THINGS: NOT AT ALL

## 2020-01-12 NOTE — DISCHARGE PLANNING
Received Choice form at [phone]  Agency/Facility Name: [facility]  Referral sent per Choice form @ [phone]

## 2020-01-12 NOTE — PROGRESS NOTES
Mer[facility] has been stable on this shift.  A&O x4, bed rest for most of the day, LP done at 1215 approximately, tolerated well.  Pt denies any pain or shortness of breath.  No post procedures issues at this time.  VSS

## 2020-01-12 NOTE — THERAPY
"Occupational Therapy Treatment completed with focus on ADLs, ADL transfers and patient education.  Functional Status:  SPV supine>sit with HOB elevated (pt has adjustable base bed at home), SPV sit>stand with cues, Mod I toilet txf, Mod I toileting, SPV functional mobility pushing IV pole for convenience   Plan of Care: Will benefit from Occupational Therapy 3 times per week  Discharge Recommendations:  Equipment No Equipment Needed. Post-acute therapy Recommend home health transitional care for continued occupational therapy services.     See \"Rehab Therapy-Acute\" Patient Summary Report for complete documentation.     Pt seen for OT tx at request of MD to assess progress and assist with DC planning. All imaging and LP results are negative. OT session followed  educating pt on post-acute options should dc home be deemed unsafe. Pt demonstrated improved independence with basic ADLs compared to performance at evaluation. Pt did not need physical assist for bed mobility, sit>stand, functional transfers, standing grooming or toileting. Pt reports reduced photosensitivity and improved tolerance for activity. Pt did report increased HA following 10min OOB activity, requested BTB instead of bedside chair, demonstrated sit>supine and scooting herself higher in bed without assist. Pt expressed confidence that she could manage her basic ADLs at home, reports strong desire to d/c home. Recommend HHOT follow up to ensure safe home environment and to progress UE strength.   "

## 2020-01-12 NOTE — DISCHARGE PLANNING
Anticipated Discharge Disposition: home with home health    Action: Spoke with pt and obtained choices for home health: 1. [facility]. 2. [facility]. 3. [facility]. Faxed choice form to YOEL Galvez at x [phone].     Barriers to Discharge: home health acceptance, medical clearance.     Plan: follow up with HH

## 2020-01-12 NOTE — FACE TO FACE
Face to Face Supporting Documentation - Home Health    The encounter with this patient was in whole or in part the primary reason for home health admission.    Date of encounter:   Patient:                    MRN:                       YOB: 2021  Mercy Almeida  [id number]  1995     Home health to see patient for:  Skilled Nursing care for assessment, interventions & education, Home health aide, Physical Therapy evaluation and treatment and Occupational therapy evaluation and treatment    Skilled need for:  New Onset Medical Diagnosis status migrainosus and Recent Deterioration of Health Status Left sided hemiplegia    Skilled nursing interventions to include:  Line/Drain/Airway education and care    Homebound status evidenced by:  Need the aid of supportive devices such as crutches, canes, wheelchairs or walkers or Needs the assistance of another person in order to leave the home. Leaving home requires a considerable and taxing effort. There is a normal inability to leave the home.    Community Physician to provide follow up care: Juan Crabtree D.O.     Optional Interventions? No      I certify the face to face encounter for this home health care referral meets the CMS requirements and the encounter/clinical assessment with the patient was, in whole, or in part, for the medical condition(s) listed above, which is the primary reason for home health care. Based on my clinical findings: the service(s) are medically necessary, support the need for home health care, and the homebound criteria are met.  I certify that this patient has had a face to face encounter by myself.  Mariana Phillips M.D. - NPI: [id number]

## 2020-01-12 NOTE — DISCHARGE SUMMARY
Discharge Summary    CHIEF COMPLAINT ON ADMISSION  Chief Complaint   Patient presents with   • Weakness       Reason for Admission  Muscle Weakness     Admission Date  1/9/2020    CODE STATUS  Full Code    HPI & HOSPITAL COURSE  This is a 24 y.o. female here with with no significant past medical history is coming today complaining of upper and lower extremity weakness associated with diplopia, headaches and photophobia.   The patient went to the hospital her vital signs were within normal limits.  CT scan found a meningioma in the parasagittal left frontal vertex. MRI of the brain, C-spine, T-spine, L-spine with IV contrast was negative for demyelination disease.  CPK levels and procalcitonin were normal.  Patient then underwent an lumbar puncture left WBC count was 0, RBC count was 1, glucose 56, protein for 25.  Blood and LP cultures were negative.  Oligonucleotide levels were pending.  Patient is also complaining of urinary symptoms such as increased frequency, dysuria and foul-smelling urine.  Her UA was positive and she was subsequently started on Macrobid.  Patient was found to have migraine attacks and was treated with abortive therapy.  She was asked to follow-up with outpatient neurologist and will have home health.    Therefore, she is discharged in good and stable condition to home with organized home healthcare and close outpatient follow-up.    The patient met 2-midnight criteria for an inpatient stay at the time of discharge.    Discharge Date  1/12/2020    FOLLOW UP ITEMS POST DISCHARGE  Follow-up on urine cultures  Follow-up on Eliquis nucleotide levels    DISCHARGE DIAGNOSES  Principal Problem:    Weakness POA: Yes  Active Problems:    Meningioma (HCC) POA: Yes    Vitamin D deficiency POA: Unknown  Resolved Problems:    * No resolved hospital problems. *      FOLLOW UP  No future appointments.  No follow-up provider specified.    MEDICATIONS ON DISCHARGE     Medication List      START taking these  medications      Instructions   nitrofurantoin monohyd macro 100 MG Caps  Commonly known as:  MACROBID   Take 1 Cap by mouth 2 times a day, with meals for 5 days.  Dose:  100 mg            Allergies  No Known Allergies    DIET  Orders Placed This Encounter   Procedures   • Diet Order Regular     Standing Status:   Standing     Number of Occurrences:   1     Order Specific Question:   Diet:     Answer:   Regular [1]       ACTIVITY  As tolerated.  Weight bearing as tolerated    CONSULTATIONS  None    PROCEDURES  None    LABORATORY  Lab Results   Component Value Date    SODIUM 141 01/10/2020    POTASSIUM 3.7 01/10/2020    CHLORIDE 106 01/10/2020    CO2 25 01/10/2020    GLUCOSE 78 01/10/2020    BUN 19 01/10/2020    CREATININE 0.99 01/10/2020        Lab Results   Component Value Date    WBC 7.3 01/10/2020    HEMOGLOBIN 15.4 01/10/2020    HEMATOCRIT 45.9 01/10/2020    PLATELETCT 342 01/10/2020        Total time of the discharge process exceeds 50 minutes.

## 2020-01-12 NOTE — PROGRESS NOTES
Bedside report given per day shift. Pt alert and oriented x4, up to bathroom with SBA, band aid on lower back is clean, dry, intact from LP today. Pt has headache 2/10 after walking to bathroom.

## 2020-01-12 NOTE — DISCHARGE PLANNING
ATTN: Case Management  RE: Referral for Home Health    Reason for referral denial: Currently at capacity for this plan              Unfortunately, we are not able to accept this referral for the reason listed above. If further clarity is needed, our Transitional Care Specialists are available to discuss any barriers to service at [phone].      We look forward to collaborating with you in the future,  [facility] Team

## 2020-01-13 LAB
ANA INTERPRETIVE COMMENT Q5143: ABNORMAL
ANA PATTERN Q5144: ABNORMAL
ANA TITER Q5145: ABNORMAL
ANTINUCLEAR ANTIBODY (ANA), HEP-2, IGG Q5142: DETECTED
BACTERIA UR CULT: ABNORMAL
BACTERIA UR CULT: ABNORMAL
HSV1+2 IGM CSF-ACNC: 0.15 IV
SIGNIFICANT IND 70042: ABNORMAL
SITE SITE: ABNORMAL
SOURCE SOURCE: ABNORMAL

## 2020-01-13 NOTE — DISCHARGE INSTRUCTIONS
Discharge Instructions    Discharged to home by car with relative. Discharged via wheelchair, hospital escort: Yes.  Special equipment needed: Not Applicable    Be sure to schedule a follow-up appointment with your primary care doctor or any specialists as instructed.     Discharge Plan:   Diet Plan: Discussed  Activity Level: Discussed  Confirmed Follow up Appointment: Patient to Call and Schedule Appointment  Confirmed Symptoms Management: Discussed  Medication Reconciliation Updated: Yes  Influenza Vaccine Indication: Patient Refuses    I understand that a diet low in cholesterol, fat, and sodium is recommended for good health. Unless I have been given specific instructions below for another diet, I accept this instruction as my diet prescription.   Other diet: Regular    Special Instructions: None    · Is patient discharged on Warfarin / Coumadin?   No     Depression / Suicide Risk    As you are discharged from this [facility] facility, it is important to learn how to keep safe from harming yourself.    Recognize the warning signs:  · Abrupt changes in personality, positive or negative- including increase in energy   · Giving away possessions  · Change in eating patterns- significant weight changes-  positive or negative  · Change in sleeping patterns- unable to sleep or sleeping all the time   · Unwillingness or inability to communicate  · Depression  · Unusual sadness, discouragement and loneliness  · Talk of wanting to die  · Neglect of personal appearance   · Rebelliousness- reckless behavior  · Withdrawal from people/activities they love  · Confusion- inability to concentrate     If you or a loved one observes any of these behaviors or has concerns about self-harm, here's what you can do:  · Talk about it- your feelings and reasons for harming yourself  · Remove any means that you might use to hurt yourself (examples: pills, rope, extension cords, firearm)  · Get professional help from the community  (Mental Health, Substance Abuse, psychological counseling)  · Do not be alone:Call your Safe Contact- someone whom you trust who will be there for you.  · Call your local CRISIS HOTLINE 268-7743 or 136-709-9466  · Call your local Children's Mobile Crisis Response Team [place] (821) 334-7172 or www.[employer]  · Call the toll free National Suicide Prevention Hotlines   · National Suicide Prevention Lifeline 480-869-WUDH (4325)  · National Hope Line Network 800-SUICIDE (455-1602)

## 2020-01-14 LAB
BACTERIA CSF CULT: NORMAL
GRAM STN SPEC: NORMAL
IGG CSF-MCNC: 1.7 MG/DL (ref 0–6)
SIGNIFICANT IND 70042: NORMAL
SITE SITE: NORMAL
SOURCE SOURCE: NORMAL
SPECIMEN SOURCE: NORMAL
VDRL CSF QL: NON REACTIVE
VZV DNA SPEC QL NAA+PROBE: NOT DETECTED

## 2020-01-15 LAB
OLIGOCLONAL BANDS CSF ELPH-IMP: ABNORMAL
OLIGOCLONAL BANDS CSF IEF: 4 BANDS (ref 0–1)
OLIGOCLONAL BANDS.IT SER+CSF QL: POSITIVE

## 2020-01-27 ENCOUNTER — HOSPITAL ENCOUNTER (OUTPATIENT)
Dept: LAB | Facility: MEDICAL CENTER | Age: 25
End: 2020-01-27
Attending: SPECIALIST
Payer: COMMERCIAL

## 2020-01-27 LAB
APPEARANCE UR: ABNORMAL
BACTERIA #/AREA URNS HPF: ABNORMAL /HPF
BILIRUB UR QL STRIP.AUTO: NEGATIVE
CAOX CRY #/AREA URNS HPF: ABNORMAL /HPF
COLOR UR: YELLOW
EPI CELLS #/AREA URNS HPF: ABNORMAL /HPF
GLUCOSE UR STRIP.AUTO-MCNC: NEGATIVE MG/DL
KETONES UR STRIP.AUTO-MCNC: NEGATIVE MG/DL
LEUKOCYTE ESTERASE UR QL STRIP.AUTO: ABNORMAL
MICRO URNS: ABNORMAL
NITRITE UR QL STRIP.AUTO: POSITIVE
PH UR STRIP.AUTO: 6 [PH] (ref 5–8)
PROT UR QL STRIP: NEGATIVE MG/DL
RBC # URNS HPF: ABNORMAL /HPF
RBC UR QL AUTO: ABNORMAL
SP GR UR STRIP.AUTO: 1.02
T4 SERPL-MCNC: 10.5 UG/DL (ref 4–12)
THYROPEROXIDASE AB SERPL-ACNC: 1441 IU/ML (ref 0–9)
UROBILINOGEN UR STRIP.AUTO-MCNC: 0.2 MG/DL
WBC #/AREA URNS HPF: ABNORMAL /HPF

## 2020-01-27 PROCEDURE — 84436 ASSAY OF TOTAL THYROXINE: CPT

## 2020-01-27 PROCEDURE — 86800 THYROGLOBULIN ANTIBODY: CPT

## 2020-01-27 PROCEDURE — 81001 URINALYSIS AUTO W/SCOPE: CPT

## 2020-01-27 PROCEDURE — 87186 SC STD MICRODIL/AGAR DIL: CPT

## 2020-01-27 PROCEDURE — 87077 CULTURE AEROBIC IDENTIFY: CPT

## 2020-01-27 PROCEDURE — 87086 URINE CULTURE/COLONY COUNT: CPT

## 2020-01-27 PROCEDURE — 86376 MICROSOMAL ANTIBODY EACH: CPT

## 2020-01-27 PROCEDURE — 83516 IMMUNOASSAY NONANTIBODY: CPT

## 2020-01-27 PROCEDURE — 36415 COLL VENOUS BLD VENIPUNCTURE: CPT

## 2020-01-27 PROCEDURE — 83519 RIA NONANTIBODY: CPT

## 2020-01-29 LAB
ACHR BIND AB SER-SCNC: 0 NMOL/L (ref 0–0.4)
ACHR BLOCK AB/ACHR TOTAL SFR SER: 12 % (ref 0–26)
THYROGLOB AB SERPL-ACNC: 1.2 IU/ML (ref 0–4)

## 2020-01-30 LAB
BACTERIA UR CULT: ABNORMAL
BACTERIA UR CULT: ABNORMAL
SIGNIFICANT IND 70042: ABNORMAL
SITE SITE: ABNORMAL
SOURCE SOURCE: ABNORMAL

## 2020-05-04 ENCOUNTER — HOSPITAL ENCOUNTER (OUTPATIENT)
Dept: LAB | Facility: MEDICAL CENTER | Age: 25
End: 2020-05-04
Attending: INTERNAL MEDICINE
Payer: COMMERCIAL

## 2020-05-04 LAB
ALBUMIN SERPL BCP-MCNC: 4 G/DL (ref 3.2–4.9)
ALBUMIN/GLOB SERPL: 1.5 G/DL
ALP SERPL-CCNC: 79 U/L (ref 30–99)
ALT SERPL-CCNC: 29 U/L (ref 2–50)
AMYLASE SERPL-CCNC: 114 U/L (ref 20–103)
ANION GAP SERPL CALC-SCNC: 10 MMOL/L (ref 7–16)
AST SERPL-CCNC: 21 U/L (ref 12–45)
BASOPHILS # BLD AUTO: 0.5 % (ref 0–1.8)
BASOPHILS # BLD: 0.04 K/UL (ref 0–0.12)
BILIRUB SERPL-MCNC: 0.3 MG/DL (ref 0.1–1.5)
BUN SERPL-MCNC: 19 MG/DL (ref 8–22)
CALCIUM SERPL-MCNC: 9.3 MG/DL (ref 8.5–10.5)
CHLORIDE SERPL-SCNC: 103 MMOL/L (ref 96–112)
CO2 SERPL-SCNC: 23 MMOL/L (ref 20–33)
CORTIS SERPL-MCNC: 9.3 UG/DL (ref 0–23)
CREAT SERPL-MCNC: 0.8 MG/DL (ref 0.5–1.4)
EOSINOPHIL # BLD AUTO: 0.18 K/UL (ref 0–0.51)
EOSINOPHIL NFR BLD: 2.3 % (ref 0–6.9)
ERYTHROCYTE [DISTWIDTH] IN BLOOD BY AUTOMATED COUNT: 45.4 FL (ref 35.9–50)
EST. AVERAGE GLUCOSE BLD GHB EST-MCNC: 97 MG/DL
GLOBULIN SER CALC-MCNC: 2.7 G/DL (ref 1.9–3.5)
GLUCOSE SERPL-MCNC: 90 MG/DL (ref 65–99)
HBA1C MFR BLD: 5 % (ref 0–5.6)
HCT VFR BLD AUTO: 44.5 % (ref 37–47)
HGB BLD-MCNC: 14.5 G/DL (ref 12–16)
IMM GRANULOCYTES # BLD AUTO: 0.02 K/UL (ref 0–0.11)
IMM GRANULOCYTES NFR BLD AUTO: 0.3 % (ref 0–0.9)
LIPASE SERPL-CCNC: 60 U/L (ref 11–82)
LYMPHOCYTES # BLD AUTO: 2.83 K/UL (ref 1–4.8)
LYMPHOCYTES NFR BLD: 35.9 % (ref 22–41)
MCH RBC QN AUTO: 28.3 PG (ref 27–33)
MCHC RBC AUTO-ENTMCNC: 32.6 G/DL (ref 33.6–35)
MCV RBC AUTO: 86.9 FL (ref 81.4–97.8)
MONOCYTES # BLD AUTO: 0.4 K/UL (ref 0–0.85)
MONOCYTES NFR BLD AUTO: 5.1 % (ref 0–13.4)
NEUTROPHILS # BLD AUTO: 4.42 K/UL (ref 2–7.15)
NEUTROPHILS NFR BLD: 55.9 % (ref 44–72)
NRBC # BLD AUTO: 0 K/UL
NRBC BLD-RTO: 0 /100 WBC
PLATELET # BLD AUTO: 375 K/UL (ref 164–446)
PMV BLD AUTO: 9.4 FL (ref 9–12.9)
POTASSIUM SERPL-SCNC: 4.4 MMOL/L (ref 3.6–5.5)
PROT SERPL-MCNC: 6.7 G/DL (ref 6–8.2)
PTH-INTACT SERPL-MCNC: 32.9 PG/ML (ref 14–72)
RBC # BLD AUTO: 5.12 M/UL (ref 4.2–5.4)
SODIUM SERPL-SCNC: 136 MMOL/L (ref 135–145)
WBC # BLD AUTO: 7.9 K/UL (ref 4.8–10.8)

## 2020-05-04 PROCEDURE — 83690 ASSAY OF LIPASE: CPT

## 2020-05-04 PROCEDURE — 83036 HEMOGLOBIN GLYCOSYLATED A1C: CPT

## 2020-05-04 PROCEDURE — 36415 COLL VENOUS BLD VENIPUNCTURE: CPT

## 2020-05-04 PROCEDURE — 85025 COMPLETE CBC W/AUTO DIFF WBC: CPT

## 2020-05-04 PROCEDURE — 84443 ASSAY THYROID STIM HORMONE: CPT

## 2020-05-04 PROCEDURE — 82150 ASSAY OF AMYLASE: CPT

## 2020-05-04 PROCEDURE — 82533 TOTAL CORTISOL: CPT

## 2020-05-04 PROCEDURE — 83970 ASSAY OF PARATHORMONE: CPT

## 2020-05-04 PROCEDURE — 80053 COMPREHEN METABOLIC PANEL: CPT

## 2020-05-05 ENCOUNTER — HOSPITAL ENCOUNTER (OUTPATIENT)
Facility: MEDICAL CENTER | Age: 25
End: 2020-05-05
Attending: INTERNAL MEDICINE
Payer: COMMERCIAL

## 2020-05-05 LAB — WBC STL QL MICRO: NORMAL

## 2020-05-05 PROCEDURE — 89055 LEUKOCYTE ASSESSMENT FECAL: CPT

## 2020-05-05 PROCEDURE — 83630 LACTOFERRIN FECAL (QUAL): CPT

## 2020-05-05 PROCEDURE — 83993 ASSAY FOR CALPROTECTIN FECAL: CPT

## 2020-05-07 LAB — LACTOFERRIN STL QL IA: NEGATIVE

## 2020-05-08 LAB
CALPROTECTIN STL-MCNT: 22 UG/G
TEST NAME 95000: NORMAL

## 2020-05-18 ENCOUNTER — APPOINTMENT (OUTPATIENT)
Dept: RADIOLOGY | Facility: MEDICAL CENTER | Age: 25
End: 2020-05-18
Attending: INTERNAL MEDICINE
Payer: COMMERCIAL

## 2020-05-19 ENCOUNTER — TELEPHONE (OUTPATIENT)
Dept: HEALTH INFORMATION MANAGEMENT | Facility: OTHER | Age: 25
End: 2020-05-19

## 2020-05-19 NOTE — TELEPHONE ENCOUNTER
1. Caller Name: Mercy Wallis                        Call Back Number: [phone]  [facility] PCP or Specialty Provider: Yes         2.  Does patient have any active symptoms of respiratory illness? No.    3.  Does patient have any comoribidities? None     4.  Has the patient traveled in the last 14 days OR had any known contact with someone who is suspected or confirmed to have COVID-19?  Yes, She states that her Mother tested positive on 5/15.  She says that she was around her on that day.  She has been quarantining herself since then.      5. POTENTIAL PUI (LOW): Advised to perform self care, monitor for worsening symptoms and to call back in 3 days if no improvement. Instructed to self isolate and contact [facility] at [phone]     She will schedule out 15 days after the 15th and if she becomes symptomatic she will then be tested at the [facility] which is what they advised her to do as well.    Note routed to [facility] Provider: CYNTHIA only.

## 2020-08-17 ENCOUNTER — HOSPITAL ENCOUNTER (OUTPATIENT)
Dept: LAB | Facility: MEDICAL CENTER | Age: 25
End: 2020-08-17
Attending: NURSE PRACTITIONER
Payer: COMMERCIAL

## 2020-08-17 LAB
25(OH)D3 SERPL-MCNC: 22 NG/ML (ref 30–100)
ALBUMIN SERPL BCP-MCNC: 4.1 G/DL (ref 3.2–4.9)
ALBUMIN/GLOB SERPL: 1.5 G/DL
ALP SERPL-CCNC: 85 U/L (ref 30–99)
ALT SERPL-CCNC: 10 U/L (ref 2–50)
ANION GAP SERPL CALC-SCNC: 10 MMOL/L (ref 7–16)
AST SERPL-CCNC: 10 U/L (ref 12–45)
BASOPHILS # BLD AUTO: 0.5 % (ref 0–1.8)
BASOPHILS # BLD: 0.03 K/UL (ref 0–0.12)
BILIRUB SERPL-MCNC: 0.5 MG/DL (ref 0.1–1.5)
BUN SERPL-MCNC: 18 MG/DL (ref 8–22)
CALCIUM SERPL-MCNC: 9.5 MG/DL (ref 8.5–10.5)
CHLORIDE SERPL-SCNC: 103 MMOL/L (ref 96–112)
CHOLEST SERPL-MCNC: 131 MG/DL (ref 100–199)
CO2 SERPL-SCNC: 24 MMOL/L (ref 20–33)
CREAT SERPL-MCNC: 0.76 MG/DL (ref 0.5–1.4)
EOSINOPHIL # BLD AUTO: 0.21 K/UL (ref 0–0.51)
EOSINOPHIL NFR BLD: 3.2 % (ref 0–6.9)
ERYTHROCYTE [DISTWIDTH] IN BLOOD BY AUTOMATED COUNT: 40.4 FL (ref 35.9–50)
FASTING STATUS PATIENT QL REPORTED: NORMAL
GLOBULIN SER CALC-MCNC: 2.8 G/DL (ref 1.9–3.5)
GLUCOSE SERPL-MCNC: 90 MG/DL (ref 65–99)
HCT VFR BLD AUTO: 44 % (ref 37–47)
HDLC SERPL-MCNC: 57 MG/DL
HGB BLD-MCNC: 14.4 G/DL (ref 12–16)
IMM GRANULOCYTES # BLD AUTO: 0.01 K/UL (ref 0–0.11)
IMM GRANULOCYTES NFR BLD AUTO: 0.2 % (ref 0–0.9)
LDLC SERPL CALC-MCNC: 68 MG/DL
LYMPHOCYTES # BLD AUTO: 3.02 K/UL (ref 1–4.8)
LYMPHOCYTES NFR BLD: 46.5 % (ref 22–41)
MCH RBC QN AUTO: 28 PG (ref 27–33)
MCHC RBC AUTO-ENTMCNC: 32.7 G/DL (ref 33.6–35)
MCV RBC AUTO: 85.6 FL (ref 81.4–97.8)
MONOCYTES # BLD AUTO: 0.44 K/UL (ref 0–0.85)
MONOCYTES NFR BLD AUTO: 6.8 % (ref 0–13.4)
NEUTROPHILS # BLD AUTO: 2.79 K/UL (ref 2–7.15)
NEUTROPHILS NFR BLD: 42.8 % (ref 44–72)
NRBC # BLD AUTO: 0 K/UL
NRBC BLD-RTO: 0 /100 WBC
PLATELET # BLD AUTO: 369 K/UL (ref 164–446)
PMV BLD AUTO: 9.7 FL (ref 9–12.9)
POTASSIUM SERPL-SCNC: 4.2 MMOL/L (ref 3.6–5.5)
PROT SERPL-MCNC: 6.9 G/DL (ref 6–8.2)
RBC # BLD AUTO: 5.14 M/UL (ref 4.2–5.4)
SODIUM SERPL-SCNC: 137 MMOL/L (ref 135–145)
T3FREE SERPL-MCNC: 2.81 PG/ML (ref 2–4.4)
TRIGL SERPL-MCNC: 29 MG/DL (ref 0–149)
TSH SERPL DL<=0.005 MIU/L-ACNC: 1.99 UIU/ML (ref 0.38–5.33)
WBC # BLD AUTO: 6.5 K/UL (ref 4.8–10.8)

## 2020-08-17 PROCEDURE — 80053 COMPREHEN METABOLIC PANEL: CPT

## 2020-08-17 PROCEDURE — 84439 ASSAY OF FREE THYROXINE: CPT

## 2020-08-17 PROCEDURE — 82306 VITAMIN D 25 HYDROXY: CPT

## 2020-08-17 PROCEDURE — 36415 COLL VENOUS BLD VENIPUNCTURE: CPT

## 2020-08-17 PROCEDURE — 80061 LIPID PANEL: CPT

## 2020-08-17 PROCEDURE — 84481 FREE ASSAY (FT-3): CPT

## 2020-08-17 PROCEDURE — 85025 COMPLETE CBC W/AUTO DIFF WBC: CPT

## 2020-08-17 PROCEDURE — 84443 ASSAY THYROID STIM HORMONE: CPT

## 2020-08-22 LAB — T4 FREE SERPL DIALY-MCNC: 1.8 NG/DL (ref 1.1–2.4)

## 2020-12-26 ENCOUNTER — HOSPITAL ENCOUNTER (OUTPATIENT)
Dept: LAB | Facility: MEDICAL CENTER | Age: 25
End: 2020-12-26
Attending: NURSE PRACTITIONER
Payer: COMMERCIAL

## 2020-12-26 LAB — 25(OH)D3 SERPL-MCNC: 21 NG/ML (ref 30–100)

## 2020-12-26 PROCEDURE — 36415 COLL VENOUS BLD VENIPUNCTURE: CPT

## 2020-12-26 PROCEDURE — 83519 RIA NONANTIBODY: CPT | Mod: 91

## 2020-12-26 PROCEDURE — 82306 VITAMIN D 25 HYDROXY: CPT

## 2020-12-26 PROCEDURE — 83516 IMMUNOASSAY NONANTIBODY: CPT

## 2020-12-29 LAB — MUSK AB SER-SCNC: 0 NMOL/L (ref 0–0.03)

## 2020-12-31 LAB
ACHR BIND AB SER-SCNC: 0 NMOL/L (ref 0–0.4)
ACHR BLOCK AB/ACHR TOTAL SFR SER: 1 % (ref 0–26)
AQP4 H2O CHANNEL AB SERPL IA-ACNC: <1.5 U/ML

## 2021-12-08 ENCOUNTER — HOSPITAL ENCOUNTER (OUTPATIENT)
Dept: LAB | Facility: MEDICAL CENTER | Age: 26
End: 2021-12-08
Attending: OBSTETRICS & GYNECOLOGY
Payer: COMMERCIAL

## 2021-12-08 LAB — B-HCG SERPL-ACNC: ABNORMAL MIU/ML (ref 0–5)

## 2021-12-08 PROCEDURE — 84702 CHORIONIC GONADOTROPIN TEST: CPT

## 2021-12-08 PROCEDURE — 36415 COLL VENOUS BLD VENIPUNCTURE: CPT

## 2022-02-12 ENCOUNTER — HOSPITAL ENCOUNTER (OUTPATIENT)
Dept: LAB | Facility: MEDICAL CENTER | Age: 27
End: 2022-02-12
Attending: OBSTETRICS & GYNECOLOGY
Payer: COMMERCIAL

## 2022-02-12 LAB
ABO GROUP BLD: NORMAL
BASOPHILS # BLD AUTO: 0.2 % (ref 0–1.8)
BASOPHILS # BLD: 0.02 K/UL (ref 0–0.12)
BLD GP AB SCN SERPL QL: NORMAL
EOSINOPHIL # BLD AUTO: 0.12 K/UL (ref 0–0.51)
EOSINOPHIL NFR BLD: 1.4 % (ref 0–6.9)
ERYTHROCYTE [DISTWIDTH] IN BLOOD BY AUTOMATED COUNT: 41.8 FL (ref 35.9–50)
HBV SURFACE AG SER QL: NORMAL
HCT VFR BLD AUTO: 40.8 % (ref 37–47)
HCV AB SER QL: NORMAL
HGB BLD-MCNC: 13.9 G/DL (ref 12–16)
HIV 1+2 AB+HIV1 P24 AG SERPL QL IA: NORMAL
IMM GRANULOCYTES # BLD AUTO: 0.03 K/UL (ref 0–0.11)
IMM GRANULOCYTES NFR BLD AUTO: 0.3 % (ref 0–0.9)
LYMPHOCYTES # BLD AUTO: 2.51 K/UL (ref 1–4.8)
LYMPHOCYTES NFR BLD: 29.3 % (ref 22–41)
MCH RBC QN AUTO: 28.9 PG (ref 27–33)
MCHC RBC AUTO-ENTMCNC: 34.1 G/DL (ref 33.6–35)
MCV RBC AUTO: 84.8 FL (ref 81.4–97.8)
MONOCYTES # BLD AUTO: 0.45 K/UL (ref 0–0.85)
MONOCYTES NFR BLD AUTO: 5.2 % (ref 0–13.4)
NEUTROPHILS # BLD AUTO: 5.45 K/UL (ref 2–7.15)
NEUTROPHILS NFR BLD: 63.6 % (ref 44–72)
NRBC # BLD AUTO: 0 K/UL
NRBC BLD-RTO: 0 /100 WBC
PLATELET # BLD AUTO: 363 K/UL (ref 164–446)
PMV BLD AUTO: 9.7 FL (ref 9–12.9)
RBC # BLD AUTO: 4.81 M/UL (ref 4.2–5.4)
RH BLD: NORMAL
RUBV AB SER QL: 80.3 IU/ML
TREPONEMA PALLIDUM IGG+IGM AB [PRESENCE] IN SERUM OR PLASMA BY IMMUNOASSAY: NORMAL
WBC # BLD AUTO: 8.6 K/UL (ref 4.8–10.8)

## 2022-02-12 PROCEDURE — 86900 BLOOD TYPING SEROLOGIC ABO: CPT

## 2022-02-12 PROCEDURE — 87086 URINE CULTURE/COLONY COUNT: CPT

## 2022-02-12 PROCEDURE — 86780 TREPONEMA PALLIDUM: CPT

## 2022-02-12 PROCEDURE — 87077 CULTURE AEROBIC IDENTIFY: CPT

## 2022-02-12 PROCEDURE — 86901 BLOOD TYPING SEROLOGIC RH(D): CPT

## 2022-02-12 PROCEDURE — 86762 RUBELLA ANTIBODY: CPT

## 2022-02-12 PROCEDURE — 86850 RBC ANTIBODY SCREEN: CPT

## 2022-02-12 PROCEDURE — 87389 HIV-1 AG W/HIV-1&-2 AB AG IA: CPT

## 2022-02-12 PROCEDURE — 86803 HEPATITIS C AB TEST: CPT

## 2022-02-12 PROCEDURE — 36415 COLL VENOUS BLD VENIPUNCTURE: CPT

## 2022-02-12 PROCEDURE — 87340 HEPATITIS B SURFACE AG IA: CPT

## 2022-02-12 PROCEDURE — 85025 COMPLETE CBC W/AUTO DIFF WBC: CPT

## 2022-02-12 PROCEDURE — 87186 SC STD MICRODIL/AGAR DIL: CPT

## 2022-04-30 ENCOUNTER — HOSPITAL ENCOUNTER (OUTPATIENT)
Dept: LAB | Facility: MEDICAL CENTER | Age: 27
End: 2022-04-30
Attending: OBSTETRICS & GYNECOLOGY
Payer: COMMERCIAL

## 2022-04-30 LAB
ERYTHROCYTE [DISTWIDTH] IN BLOOD BY AUTOMATED COUNT: 43 FL (ref 35.9–50)
GLUCOSE 1H P 50 G GLC PO SERPL-MCNC: 132 MG/DL (ref 70–139)
HCT VFR BLD AUTO: 37.2 % (ref 37–47)
HGB BLD-MCNC: 12 G/DL (ref 12–16)
MCH RBC QN AUTO: 27.4 PG (ref 27–33)
MCHC RBC AUTO-ENTMCNC: 32.3 G/DL (ref 33.6–35)
MCV RBC AUTO: 84.9 FL (ref 81.4–97.8)
PLATELET # BLD AUTO: 357 K/UL (ref 164–446)
PMV BLD AUTO: 9.8 FL (ref 9–12.9)
RBC # BLD AUTO: 4.38 M/UL (ref 4.2–5.4)
T PALLIDUM AB SER QL IA: NORMAL
WBC # BLD AUTO: 10 K/UL (ref 4.8–10.8)

## 2022-04-30 PROCEDURE — 86780 TREPONEMA PALLIDUM: CPT

## 2022-04-30 PROCEDURE — 82950 GLUCOSE TEST: CPT

## 2022-04-30 PROCEDURE — 85027 COMPLETE CBC AUTOMATED: CPT

## 2022-04-30 PROCEDURE — 36415 COLL VENOUS BLD VENIPUNCTURE: CPT

## 2022-06-22 ENCOUNTER — HOSPITAL ENCOUNTER (OUTPATIENT)
Dept: LAB | Facility: MEDICAL CENTER | Age: 27
End: 2022-06-22
Attending: NURSE PRACTITIONER
Payer: COMMERCIAL

## 2022-06-24 ENCOUNTER — HOSPITAL ENCOUNTER (OUTPATIENT)
Dept: LAB | Facility: MEDICAL CENTER | Age: 27
End: 2022-06-24
Attending: NURSE PRACTITIONER
Payer: COMMERCIAL

## 2022-06-24 LAB
ALBUMIN SERPL BCP-MCNC: 3.2 G/DL (ref 3.2–4.9)
ALBUMIN/GLOB SERPL: 1.2 G/DL
ALP SERPL-CCNC: 99 U/L (ref 30–99)
ALT SERPL-CCNC: 18 U/L (ref 2–50)
ANION GAP SERPL CALC-SCNC: 9 MMOL/L (ref 7–16)
APPEARANCE UR: ABNORMAL
AST SERPL-CCNC: 21 U/L (ref 12–45)
BACTERIA #/AREA URNS HPF: ABNORMAL /HPF
BILIRUB SERPL-MCNC: 0.3 MG/DL (ref 0.1–1.5)
BILIRUB UR QL STRIP.AUTO: NEGATIVE
BUN SERPL-MCNC: 12 MG/DL (ref 8–22)
CALCIUM SERPL-MCNC: 8.8 MG/DL (ref 8.5–10.5)
CHLORIDE SERPL-SCNC: 107 MMOL/L (ref 96–112)
CO2 SERPL-SCNC: 22 MMOL/L (ref 20–33)
COLOR UR: YELLOW
CREAT SERPL-MCNC: 0.85 MG/DL (ref 0.5–1.4)
CREAT UR-MCNC: 137.71 MG/DL
EPI CELLS #/AREA URNS HPF: ABNORMAL /HPF
ERYTHROCYTE [DISTWIDTH] IN BLOOD BY AUTOMATED COUNT: 40.4 FL (ref 35.9–50)
FASTING STATUS PATIENT QL REPORTED: NORMAL
GFR SERPLBLD CREATININE-BSD FMLA CKD-EPI: 96 ML/MIN/1.73 M 2
GLOBULIN SER CALC-MCNC: 2.6 G/DL (ref 1.9–3.5)
GLUCOSE SERPL-MCNC: 83 MG/DL (ref 65–99)
GLUCOSE UR STRIP.AUTO-MCNC: NEGATIVE MG/DL
HCT VFR BLD AUTO: 33.8 % (ref 37–47)
HGB BLD-MCNC: 10.8 G/DL (ref 12–16)
HYALINE CASTS #/AREA URNS LPF: ABNORMAL /LPF
KETONES UR STRIP.AUTO-MCNC: NEGATIVE MG/DL
LEUKOCYTE ESTERASE UR QL STRIP.AUTO: ABNORMAL
MCH RBC QN AUTO: 24.9 PG (ref 27–33)
MCHC RBC AUTO-ENTMCNC: 32 G/DL (ref 33.6–35)
MCV RBC AUTO: 78.1 FL (ref 81.4–97.8)
MICRO URNS: ABNORMAL
NITRITE UR QL STRIP.AUTO: NEGATIVE
PH UR STRIP.AUTO: 5.5 [PH] (ref 5–8)
PLATELET # BLD AUTO: 310 K/UL (ref 164–446)
PMV BLD AUTO: 10.6 FL (ref 9–12.9)
POTASSIUM SERPL-SCNC: 4.1 MMOL/L (ref 3.6–5.5)
PROT 24H UR-MCNC: 1680 MG/24 HR (ref 30–150)
PROT 24H UR-MRATE: 96 MG/DL (ref 0–15)
PROT SERPL-MCNC: 5.8 G/DL (ref 6–8.2)
PROT UR QL STRIP: 100 MG/DL
PROT UR-MCNC: 91 MG/DL (ref 0–15)
PROT/CREAT UR: 661 MG/G (ref 10–107)
RBC # BLD AUTO: 4.33 M/UL (ref 4.2–5.4)
RBC # URNS HPF: ABNORMAL /HPF
RBC UR QL AUTO: NEGATIVE
SODIUM SERPL-SCNC: 138 MMOL/L (ref 135–145)
SP GR UR STRIP.AUTO: 1.02
SPECIMEN VOL UR: 1750 ML
URATE SERPL-MCNC: 5.7 MG/DL (ref 1.9–8.2)
UROBILINOGEN UR STRIP.AUTO-MCNC: 0.2 MG/DL
WBC # BLD AUTO: 8.2 K/UL (ref 4.8–10.8)
WBC #/AREA URNS HPF: ABNORMAL /HPF

## 2022-06-24 PROCEDURE — 82575 CREATININE CLEARANCE TEST: CPT

## 2022-06-24 PROCEDURE — 81001 URINALYSIS AUTO W/SCOPE: CPT

## 2022-06-24 PROCEDURE — 82570 ASSAY OF URINE CREATININE: CPT

## 2022-06-24 PROCEDURE — 81050 URINALYSIS VOLUME MEASURE: CPT

## 2022-06-24 PROCEDURE — 84156 ASSAY OF PROTEIN URINE: CPT | Mod: 91

## 2022-06-24 PROCEDURE — 84550 ASSAY OF BLOOD/URIC ACID: CPT

## 2022-06-24 PROCEDURE — 85027 COMPLETE CBC AUTOMATED: CPT

## 2022-06-24 PROCEDURE — 36415 COLL VENOUS BLD VENIPUNCTURE: CPT

## 2022-06-24 PROCEDURE — 80053 COMPREHEN METABOLIC PANEL: CPT

## 2022-06-27 ENCOUNTER — HOSPITAL ENCOUNTER (INPATIENT)
Facility: MEDICAL CENTER | Age: 27
LOS: 5 days | End: 2022-07-02
Attending: OBSTETRICS & GYNECOLOGY | Admitting: OBSTETRICS & GYNECOLOGY
Payer: COMMERCIAL

## 2022-06-27 DIAGNOSIS — G89.18 POST-OPERATIVE PAIN: ICD-10-CM

## 2022-06-27 PROBLEM — Z34.90 ENCOUNTER FOR INDUCTION OF LABOR: Status: ACTIVE | Noted: 2022-06-27

## 2022-06-27 LAB
ALBUMIN SERPL BCP-MCNC: 3.7 G/DL (ref 3.2–4.9)
ALBUMIN/GLOB SERPL: 1.4 G/DL
ALP SERPL-CCNC: 111 U/L (ref 30–99)
ALT SERPL-CCNC: 26 U/L (ref 2–50)
ANION GAP SERPL CALC-SCNC: 13 MMOL/L (ref 7–16)
AST SERPL-CCNC: 26 U/L (ref 12–45)
BASOPHILS # BLD AUTO: 0.2 % (ref 0–1.8)
BASOPHILS # BLD: 0.02 K/UL (ref 0–0.12)
BILIRUB SERPL-MCNC: 0.2 MG/DL (ref 0.1–1.5)
BUN SERPL-MCNC: 18 MG/DL (ref 8–22)
CALCIUM SERPL-MCNC: 9.4 MG/DL (ref 8.5–10.5)
CHLORIDE SERPL-SCNC: 109 MMOL/L (ref 96–112)
CO2 SERPL-SCNC: 18 MMOL/L (ref 20–33)
COLLECT DURATION TIME UR: 24 HR
CREAT CL/1.73 SQ M ?TM UR+SERPL-ARVRAT: 114 ML/MIN (ref 88–128)
CREAT SERPL-MCNC: 0.84 MG/DL (ref 0.5–1.4)
CREAT SERPL-MCNC: 0.85 MG/DL (ref 0.5–1.4)
CREAT UR-MCNC: 104.03 MG/DL
CREAT UR-MCNC: 201.09 MG/DL
EOSINOPHIL # BLD AUTO: 0.07 K/UL (ref 0–0.51)
EOSINOPHIL NFR BLD: 0.7 % (ref 0–6.9)
ERYTHROCYTE [DISTWIDTH] IN BLOOD BY AUTOMATED COUNT: 40.7 FL (ref 35.9–50)
GFR SERPLBLD CREATININE-BSD FMLA CKD-EPI: 96 ML/MIN/1.73 M 2
GLOBULIN SER CALC-MCNC: 2.7 G/DL (ref 1.9–3.5)
GLUCOSE SERPL-MCNC: 75 MG/DL (ref 65–99)
HCT VFR BLD AUTO: 35.3 % (ref 37–47)
HGB BLD-MCNC: 11.4 G/DL (ref 12–16)
HOLDING TUBE BB 8507: NORMAL
IMM GRANULOCYTES # BLD AUTO: 0.05 K/UL (ref 0–0.11)
IMM GRANULOCYTES NFR BLD AUTO: 0.5 % (ref 0–0.9)
LYMPHOCYTES # BLD AUTO: 2.37 K/UL (ref 1–4.8)
LYMPHOCYTES NFR BLD: 22.9 % (ref 22–41)
MCH RBC QN AUTO: 25.1 PG (ref 27–33)
MCHC RBC AUTO-ENTMCNC: 32.3 G/DL (ref 33.6–35)
MCV RBC AUTO: 77.6 FL (ref 81.4–97.8)
MONOCYTES # BLD AUTO: 0.59 K/UL (ref 0–0.85)
MONOCYTES NFR BLD AUTO: 5.7 % (ref 0–13.4)
NEUTROPHILS # BLD AUTO: 7.27 K/UL (ref 2–7.15)
NEUTROPHILS NFR BLD: 70 % (ref 44–72)
NRBC # BLD AUTO: 0 K/UL
NRBC BLD-RTO: 0 /100 WBC
PLATELET # BLD AUTO: 319 K/UL (ref 164–446)
PMV BLD AUTO: 10.2 FL (ref 9–12.9)
POTASSIUM SERPL-SCNC: 4.3 MMOL/L (ref 3.6–5.5)
PROT SERPL-MCNC: 6.4 G/DL (ref 6–8.2)
PROT UR-MCNC: 524 MG/DL (ref 0–15)
PROT/CREAT UR: 2606 MG/G (ref 10–107)
RBC # BLD AUTO: 4.55 M/UL (ref 4.2–5.4)
SODIUM SERPL-SCNC: 140 MMOL/L (ref 135–145)
SPECIMEN VOL UR: 1750 ML
URINE CREATININE EXCRETED 1125: 1821 MG/24 HR
WBC # BLD AUTO: 10.4 K/UL (ref 4.8–10.8)

## 2022-06-27 PROCEDURE — 3E0P7VZ INTRODUCTION OF HORMONE INTO FEMALE REPRODUCTIVE, VIA NATURAL OR ARTIFICIAL OPENING: ICD-10-PCS | Performed by: OBSTETRICS & GYNECOLOGY

## 2022-06-27 PROCEDURE — A9270 NON-COVERED ITEM OR SERVICE: HCPCS | Performed by: OBSTETRICS & GYNECOLOGY

## 2022-06-27 PROCEDURE — 84156 ASSAY OF PROTEIN URINE: CPT

## 2022-06-27 PROCEDURE — 36415 COLL VENOUS BLD VENIPUNCTURE: CPT

## 2022-06-27 PROCEDURE — 700102 HCHG RX REV CODE 250 W/ 637 OVERRIDE(OP): Performed by: OBSTETRICS & GYNECOLOGY

## 2022-06-27 PROCEDURE — 82570 ASSAY OF URINE CREATININE: CPT

## 2022-06-27 PROCEDURE — 85025 COMPLETE CBC W/AUTO DIFF WBC: CPT

## 2022-06-27 PROCEDURE — 80053 COMPREHEN METABOLIC PANEL: CPT

## 2022-06-27 PROCEDURE — 770002 HCHG ROOM/CARE - OB PRIVATE (112)

## 2022-06-27 RX ORDER — ACETAMINOPHEN 500 MG
1000 TABLET ORAL
Status: DISCONTINUED | OUTPATIENT
Start: 2022-06-27 | End: 2022-06-29 | Stop reason: HOSPADM

## 2022-06-27 RX ORDER — NIFEDIPINE 10 MG/1
10 CAPSULE ORAL
Status: DISCONTINUED | OUTPATIENT
Start: 2022-06-27 | End: 2022-06-29 | Stop reason: HOSPADM

## 2022-06-27 RX ORDER — IBUPROFEN 800 MG/1
800 TABLET ORAL
Status: DISCONTINUED | OUTPATIENT
Start: 2022-06-27 | End: 2022-06-29 | Stop reason: HOSPADM

## 2022-06-27 RX ORDER — LABETALOL 100 MG/1
100 TABLET, FILM COATED ORAL 2 TIMES DAILY
COMMUNITY
End: 2022-10-06

## 2022-06-27 RX ORDER — LABETALOL HYDROCHLORIDE 5 MG/ML
20-80 INJECTION, SOLUTION INTRAVENOUS
Status: DISCONTINUED | OUTPATIENT
Start: 2022-06-27 | End: 2022-06-29 | Stop reason: HOSPADM

## 2022-06-27 RX ORDER — SODIUM CHLORIDE, SODIUM LACTATE, POTASSIUM CHLORIDE, CALCIUM CHLORIDE 600; 310; 30; 20 MG/100ML; MG/100ML; MG/100ML; MG/100ML
INJECTION, SOLUTION INTRAVENOUS CONTINUOUS
Status: DISCONTINUED | OUTPATIENT
Start: 2022-06-27 | End: 2022-06-29

## 2022-06-27 RX ORDER — TERBUTALINE SULFATE 1 MG/ML
0.25 INJECTION, SOLUTION SUBCUTANEOUS
Status: DISCONTINUED | OUTPATIENT
Start: 2022-06-27 | End: 2022-06-29 | Stop reason: HOSPADM

## 2022-06-27 RX ORDER — OXYTOCIN 10 [USP'U]/ML
10 INJECTION, SOLUTION INTRAMUSCULAR; INTRAVENOUS
Status: DISCONTINUED | OUTPATIENT
Start: 2022-06-27 | End: 2022-06-29 | Stop reason: HOSPADM

## 2022-06-27 RX ORDER — HYDRALAZINE HYDROCHLORIDE 20 MG/ML
5-10 INJECTION INTRAMUSCULAR; INTRAVENOUS
Status: DISCONTINUED | OUTPATIENT
Start: 2022-06-27 | End: 2022-06-29 | Stop reason: HOSPADM

## 2022-06-27 RX ADMIN — DINOPROSTONE 10 MG: 10 INSERT VAGINAL at 22:25

## 2022-06-27 ASSESSMENT — LIFESTYLE VARIABLES
AVERAGE NUMBER OF DAYS PER WEEK YOU HAVE A DRINK CONTAINING ALCOHOL: 0
EVER HAD A DRINK FIRST THING IN THE MORNING TO STEADY YOUR NERVES TO GET RID OF A HANGOVER: NO
ALCOHOL_USE: NO
HOW MANY TIMES IN THE PAST YEAR HAVE YOU HAD 5 OR MORE DRINKS IN A DAY: 0
TOTAL SCORE: 0
EVER_SMOKED: YES
CONSUMPTION TOTAL: NEGATIVE
TOTAL SCORE: 0
ON A TYPICAL DAY WHEN YOU DRINK ALCOHOL HOW MANY DRINKS DO YOU HAVE: 0
HAVE YOU EVER FELT YOU SHOULD CUT DOWN ON YOUR DRINKING: NO
HAVE PEOPLE ANNOYED YOU BY CRITICIZING YOUR DRINKING: NO
TOTAL SCORE: 0
EVER FELT BAD OR GUILTY ABOUT YOUR DRINKING: NO
DOES PATIENT WANT TO STOP DRINKING: NO

## 2022-06-27 ASSESSMENT — FIBROSIS 4 INDEX: FIB4 SCORE: 0.45

## 2022-06-28 PROCEDURE — 700102 HCHG RX REV CODE 250 W/ 637 OVERRIDE(OP): Performed by: OBSTETRICS & GYNECOLOGY

## 2022-06-28 PROCEDURE — A9270 NON-COVERED ITEM OR SERVICE: HCPCS | Performed by: OBSTETRICS & GYNECOLOGY

## 2022-06-28 PROCEDURE — 770002 HCHG ROOM/CARE - OB PRIVATE (112)

## 2022-06-28 RX ORDER — LABETALOL 100 MG/1
100 TABLET, FILM COATED ORAL TWICE DAILY
Status: DISCONTINUED | OUTPATIENT
Start: 2022-06-28 | End: 2022-06-29 | Stop reason: HOSPADM

## 2022-06-28 RX ADMIN — LABETALOL HYDROCHLORIDE 100 MG: 100 TABLET, FILM COATED ORAL at 10:36

## 2022-06-28 RX ADMIN — LABETALOL HYDROCHLORIDE 100 MG: 100 TABLET, FILM COATED ORAL at 17:57

## 2022-06-28 RX ADMIN — MISOPROSTOL 25 MCG: 100 TABLET ORAL at 12:17

## 2022-06-28 RX ADMIN — MISOPROSTOL 25 MCG: 100 TABLET ORAL at 17:45

## 2022-06-28 ASSESSMENT — PAIN DESCRIPTION - PAIN TYPE
TYPE: ACUTE PAIN

## 2022-06-28 NOTE — PROGRESS NOTES
L&D Progress Note    Name:   Mercy Almeida   Date/Time:  6/28/2022 11:04 AM  Gestational Age:  35w5d  Admit Date:   6/27/2022  Admitting Dx:   Encounter for induction of labor [Z34.90]  Preeclampsia  Subjective:  Uterine contractions: no  Pain:    no  Complaints:   no   Headache: .  no  Blurred vision:  no   SOB:    no   Nausea/vomiting:  no  Epigastric pain:  no  Fetal movement:  normal  Vaginal bleeding: . no    Objective:   Vitals:    06/28/22 0430 06/28/22 0700 06/28/22 0702 06/28/22 0937   BP: 138/88  131/74 (!) 146/96   Pulse: 67  71 73   Resp:       Temp:  36.3 °C (97.4 °F)     TempSrc:  Temporal     Weight:       Height:       cat I  toco no activity    Meds:   Labs:  Recent Results (from the past 72 hour(s))   Hold Blood Bank Specimen (Not Tested)    Collection Time: 06/27/22  8:40 PM   Result Value Ref Range    Holding Tube - Bb DONE    CBC with differential    Collection Time: 06/27/22  8:40 PM   Result Value Ref Range    WBC 10.4 4.8 - 10.8 K/uL    RBC 4.55 4.20 - 5.40 M/uL    Hemoglobin 11.4 (L) 12.0 - 16.0 g/dL    Hematocrit 35.3 (L) 37.0 - 47.0 %    MCV 77.6 (L) 81.4 - 97.8 fL    MCH 25.1 (L) 27.0 - 33.0 pg    MCHC 32.3 (L) 33.6 - 35.0 g/dL    RDW 40.7 35.9 - 50.0 fL    Platelet Count 319 164 - 446 K/uL    MPV 10.2 9.0 - 12.9 fL    Neutrophils-Polys 70.00 44.00 - 72.00 %    Lymphocytes 22.90 22.00 - 41.00 %    Monocytes 5.70 0.00 - 13.40 %    Eosinophils 0.70 0.00 - 6.90 %    Basophils 0.20 0.00 - 1.80 %    Immature Granulocytes 0.50 0.00 - 0.90 %    Nucleated RBC 0.00 /100 WBC    Neutrophils (Absolute) 7.27 (H) 2.00 - 7.15 K/uL    Lymphs (Absolute) 2.37 1.00 - 4.80 K/uL    Monos (Absolute) 0.59 0.00 - 0.85 K/uL    Eos (Absolute) 0.07 0.00 - 0.51 K/uL    Baso (Absolute) 0.02 0.00 - 0.12 K/uL    Immature Granulocytes (abs) 0.05 0.00 - 0.11 K/uL    NRBC (Absolute) 0.00 K/uL   Comp Metabolic Panel    Collection Time: 06/27/22  8:40 PM   Result Value Ref Range    Sodium 140 135 - 145 mmol/L     Potassium 4.3 3.6 - 5.5 mmol/L    Chloride 109 96 - 112 mmol/L    Co2 18 (L) 20 - 33 mmol/L    Anion Gap 13.0 7.0 - 16.0    Glucose 75 65 - 99 mg/dL    Bun 18 8 - 22 mg/dL    Creatinine 0.85 0.50 - 1.40 mg/dL    Calcium 9.4 8.5 - 10.5 mg/dL    AST(SGOT) 26 12 - 45 U/L    ALT(SGPT) 26 2 - 50 U/L    Alkaline Phosphatase 111 (H) 30 - 99 U/L    Total Bilirubin 0.2 0.1 - 1.5 mg/dL    Albumin 3.7 3.2 - 4.9 g/dL    Total Protein 6.4 6.0 - 8.2 g/dL    Globulin 2.7 1.9 - 3.5 g/dL    A-G Ratio 1.4 g/dL   PROTEIN/CREAT RATIO URINE    Collection Time: 06/27/22  8:40 PM   Result Value Ref Range    Total Protein, Urine 524.0 (H) 0.0 - 15.0 mg/dL    Creatinine, Random Urine 201.09 mg/dL    Protein Creatinine Ratio 2606 (H) 10 - 107 mg/g   ESTIMATED GFR    Collection Time: 06/27/22  8:40 PM   Result Value Ref Range    GFR (CKD-EPI) 96 >60 mL/min/1.73 m 2     Assessment:   Gestational Age:   35w5d  Risk Factors:   pre-eclampsia  Labor State:    Not in labor.    cytotec PV for cervical ripening  Recheck after 4 hours     Patient Active Problem List    Diagnosis Date Noted   • Encounter for induction of labor 06/27/2022   • Vitamin D deficiency 01/10/2020   • Weakness 01/09/2020   • Meningioma (HCC) 01/09/2020       Araceli Cohen M.D.

## 2022-06-28 NOTE — PROGRESS NOTES
0700: Report received from JACQUELIN Ding. Patient lying comfortably; pt. denies pain at this time; vital sx assessed (see flowsheets); Fob at bedside.  0941: Dr. Linus Charles update on pt. status; orders received to continue scheduled labetalol (see MAR).  0930: SVE FT/50/-3.   0950: Dr. Linus Charles updated on pt status; orders received to administer cytotec (see MAR).  1059: Dr. Linus Charles at bedside to assess pt; POC discussed; questions answered.  1350: Dr. Linus Charles updated on patient status and increasing Bps (see flow sheets).  1615: SVE unchanged.  1623: Dr. Linus Charles updated on pt status.  1745: Linus Hagan at bedside to assess patient; SVE unchanged; cytotec placed by provider (see MAR).  1900: Report given to JACQUELIN Gonzalez.

## 2022-06-28 NOTE — CARE PLAN
The patient is Watcher - Medium risk of patient condition declining or worsening    Shift Goals  Clinical Goals: safe progression of labor  Patient Goals: healthy mom/healthy baby  Family Goals: support    Progress made toward(s) clinical / shift goals:    Problem: Knowledge Deficit - L&D  Goal: Patient and family/caregivers will demonstrate understanding of plan of care, disease process/condition, diagnostic tests and medications  Outcome: Progressing  Note: Patient updated on POC; questions encouraged/answered.     Problem: Risk for Excess Fluid Volume  Goal: Patient will demonstrate pulse, blood pressure and neurologic signs within expected ranges and without any respiratory complications  Outcome: Progressing  Note: Patient assessed for sx/s of fluid excess at least q4hr; IV fluids administered only when necessary.     Problem: Psychosocial - L&D  Goal: Patient's level of anxiety will decrease  Outcome: Progressing  Note: Pt. Assessed for anxiety and educated on POC throughout shift; questions encouraged; questions answered; patient encouraged to voice feelings; therapeutic communication by RN.     Problem: Pain  Goal: Patient's pain will be alleviated or reduced to the patient’s comfort goal  Outcome: Progressing  Flowsheets (Taken 6/28/2022 1515)  OB Pain Level: 1-Coping  Note: Pt. assessed for need for pain intervention ~ q1hr; patient educated on both pharmacological and non-pharmacological pain management interventions; patient encouraged to voice need for pain intervention.       Patient is not progressing towards the following goals:NA

## 2022-06-28 NOTE — H&P
History and Physical      Mercy Almeida is a 27 y.o. female  35 weeks for IOL for preeclampsia     Subjective:   Pt was seen the office 3 days ago for OB ff-up - her BP was 130/90 with (+) 1 pedal edema. She was complaining of on and off headaches, no visual changes, no RUQ pain.   She was sent to do PIH labs. (+) elevated PC ratio  Pt presents back to office today with BP of 151/96. She was recommended induction of labor due to preeclampsia.    Pt is feeling well. Mild headaches, no visual changes, no RUQ pain    ROS: A comprehensive review of systems was negative.    No past medical history on file.  No past surgical history on file.  No family history on file.  OB History    Para Term  AB Living   1             SAB IAB Ectopic Molar Multiple Live Births                    # Outcome Date GA Lbr Mikey/2nd Weight Sex Delivery Anes PTL Lv   1 Current              Social History     Socioeconomic History   • Marital status:      Spouse name: Not on file   • Number of children: Not on file   • Years of education: Not on file   • Highest education level: Not on file   Occupational History   • Not on file   Tobacco Use   • Smoking status: Never Smoker   • Smokeless tobacco: Never Used   Substance and Sexual Activity   • Alcohol use: No   • Drug use: No   • Sexual activity: Yes     Partners: Male   Other Topics Concern   • Not on file   Social History Narrative   • Not on file     Social Determinants of Health     Financial Resource Strain: Not on file   Food Insecurity: Not on file   Transportation Needs: Not on file   Physical Activity: Not on file   Stress: Not on file   Social Connections: Not on file   Intimate Partner Violence: Not on file   Housing Stability: Not on file     Allergies: Patient has no known allergies.  No current facility-administered medications for this encounter.    Prenatal care with gissel dobbins  starting at 13 weeks  with following problems:  Patient Active  Problem List    Diagnosis Date Noted   • Vitamin D deficiency 01/10/2020   • Weakness 01/09/2020   • Meningioma (HCC) 01/09/2020     Objective:      There were no vitals taken for this visit.    General:   no acute distress, alert, cooperative   Skin:   normal   HEENT:  Sclera clear, anicteric   Lungs:   CTA bilateral   Heart:   S1, S2 normal, no murmur, click, rub or gallop, regular rate and rhythm   Abdomen:   gravid, NT   EFW:  2800   Pelvis:  adequate with gynecoid pelvis   FHT:  150 BPM   Uterine Size: S=D   Presentations: Cephalic   Cervix:     Dilation: Closed    Effacement: Long    Station:  -2    Consistency: Firm    Position: Posterior     Lab Review  Lab:   Blood type: A     Recent Results (from the past 5880 hour(s))   HCG QUANTITATIVE    Collection Time: 12/08/21  3:22 PM   Result Value Ref Range    Bhcg 61239.0 (H) 0.0 - 5.0 mIU/mL   HEP B SURFACE ANTIGEN    Collection Time: 02/12/22 10:25 AM   Result Value Ref Range    Hepatitis B Surface Antigen Non-Reactive Non-Reactive   HEP C VIRUS ANTIBODY    Collection Time: 02/12/22 10:25 AM   Result Value Ref Range    Hepatitis C Antibody Non-Reactive Non-Reactive   T.PALLIDUM AB EIA    Collection Time: 02/12/22 10:25 AM   Result Value Ref Range    Syphilis, Treponemal Qual Non-Reactive Non-Reactive   HIV AG/AB COMBO ASSAY SCREENING    Collection Time: 02/12/22 10:25 AM   Result Value Ref Range    HIV Ag/Ab Combo Assay Non-Reactive Non Reactive   RUBELLA ABS IGG    Collection Time: 02/12/22 10:25 AM   Result Value Ref Range    Rubella IgG Antibody 80.30 IU/mL   URINE CULTURE(NEW)    Collection Time: 02/12/22 10:25 AM    Specimen: Urine   Result Value Ref Range    Significant Indicator POS (POS)     Source UR     Site First Catch     Culture Result Usual skin lelia <10,000 cfu/mL (A)     Culture Result Escherichia coli  10-50,000 cfu/mL   (A)        Susceptibility    Escherichia coli - CHEMA     Ampicillin <=8 Sensitive mcg/mL     Ceftriaxone <=1 Sensitive mcg/mL      Cefazolin* <=2 Sensitive mcg/mL      * Breakpoints when Cefazolin is used for therapy of infectionsother than uncomplicated UTIs due to Enterobacterales are asfollows:CHEMA and Interpretation:<=2 S4 I>=8 R     Ciprofloxacin <=0.25 Sensitive mcg/mL     Cefepime <=2 Sensitive mcg/mL     Cefuroxime <=4 Sensitive mcg/mL     Ampicillin/sulbactam <=4/2 Sensitive mcg/mL     Tobramycin <=2 Sensitive mcg/mL     Nitrofurantoin <=32 Sensitive mcg/mL     Gentamicin <=2 Sensitive mcg/mL     Levofloxacin <=0.5 Sensitive mcg/mL     Minocycline <=4 Sensitive mcg/mL     Pip/Tazobactam <=8 Sensitive mcg/mL     Trimeth/Sulfa <=0.5/9.5 Sensitive mcg/mL     Tigecycline <=2 Sensitive mcg/mL   CBC WITH DIFFERENTIAL    Collection Time: 02/12/22 10:25 AM   Result Value Ref Range    WBC 8.6 4.8 - 10.8 K/uL    RBC 4.81 4.20 - 5.40 M/uL    Hemoglobin 13.9 12.0 - 16.0 g/dL    Hematocrit 40.8 37.0 - 47.0 %    MCV 84.8 81.4 - 97.8 fL    MCH 28.9 27.0 - 33.0 pg    MCHC 34.1 33.6 - 35.0 g/dL    RDW 41.8 35.9 - 50.0 fL    Platelet Count 363 164 - 446 K/uL    MPV 9.7 9.0 - 12.9 fL    Neutrophils-Polys 63.60 44.00 - 72.00 %    Lymphocytes 29.30 22.00 - 41.00 %    Monocytes 5.20 0.00 - 13.40 %    Eosinophils 1.40 0.00 - 6.90 %    Basophils 0.20 0.00 - 1.80 %    Immature Granulocytes 0.30 0.00 - 0.90 %    Nucleated RBC 0.00 /100 WBC    Neutrophils (Absolute) 5.45 2.00 - 7.15 K/uL    Lymphs (Absolute) 2.51 1.00 - 4.80 K/uL    Monos (Absolute) 0.45 0.00 - 0.85 K/uL    Eos (Absolute) 0.12 0.00 - 0.51 K/uL    Baso (Absolute) 0.02 0.00 - 0.12 K/uL    Immature Granulocytes (abs) 0.03 0.00 - 0.11 K/uL    NRBC (Absolute) 0.00 K/uL   ABO AND RH DETERMINATION    Collection Time: 02/12/22 10:25 AM   Result Value Ref Range    ABO Grouping Only A     Rh Grouping Only POS    ANTIBODY SCREEN    Collection Time: 02/12/22 10:25 AM   Result Value Ref Range    Antibody Screen Scrn NEG    T.PALLIDUM AB EIA    Collection Time: 04/30/22  9:24 AM   Result Value Ref Range     Syphilis, Treponemal Qual Non-Reactive Non-Reactive   CBC WITHOUT DIFFERENTIAL    Collection Time: 04/30/22  9:24 AM   Result Value Ref Range    WBC 10.0 4.8 - 10.8 K/uL    RBC 4.38 4.20 - 5.40 M/uL    Hemoglobin 12.0 12.0 - 16.0 g/dL    Hematocrit 37.2 37.0 - 47.0 %    MCV 84.9 81.4 - 97.8 fL    MCH 27.4 27.0 - 33.0 pg    MCHC 32.3 (L) 33.6 - 35.0 g/dL    RDW 43.0 35.9 - 50.0 fL    Platelet Count 357 164 - 446 K/uL    MPV 9.8 9.0 - 12.9 fL   GLUCOSE 1HR GESTATIONAL    Collection Time: 04/30/22  9:24 AM   Result Value Ref Range    Glucose, Post Dose 132 70 - 139 mg/dL   URINETOTAL PROTEIN 24 HR    Collection Time: 06/24/22 12:30 AM   Result Value Ref Range    Total Volume, Urine 1750 mL    Total Protein, Urine 96.0 (H) 0.0 - 15.0 mg/dL    Total Protein, 24 Hour Urine 1680.0 (H) 30.0 - 150.0 mg/24 Hr   CREATININE CLEARANCE    Collection Time: 06/24/22 12:30 AM   Result Value Ref Range    Weight - Creatinine Clearance 111.1 kg    Height - Creatinine Clearance 178 cm    Collection Period, Urine 24 hr    Creatine Excreated 1821 mg/24 hr    Creat.Clearance 114 88 - 128 mL/min    Total Volume, Urine 1750 mL    Creatinine Plasma, Creatinine 0.84 0.50 - 1.40 mg/dL    Creatinine, Urine 104.03 mg/dL   Comp Metabolic Panel    Collection Time: 06/24/22  6:44 AM   Result Value Ref Range    Sodium 138 135 - 145 mmol/L    Potassium 4.1 3.6 - 5.5 mmol/L    Chloride 107 96 - 112 mmol/L    Co2 22 20 - 33 mmol/L    Anion Gap 9.0 7.0 - 16.0    Glucose 83 65 - 99 mg/dL    Bun 12 8 - 22 mg/dL    Creatinine 0.85 0.50 - 1.40 mg/dL    Calcium 8.8 8.5 - 10.5 mg/dL    AST(SGOT) 21 12 - 45 U/L    ALT(SGPT) 18 2 - 50 U/L    Alkaline Phosphatase 99 30 - 99 U/L    Total Bilirubin 0.3 0.1 - 1.5 mg/dL    Albumin 3.2 3.2 - 4.9 g/dL    Total Protein 5.8 (L) 6.0 - 8.2 g/dL    Globulin 2.6 1.9 - 3.5 g/dL    A-G Ratio 1.2 g/dL   URIC ACID    Collection Time: 06/24/22  6:44 AM   Result Value Ref Range    Uric Acid 5.7 1.9 - 8.2 mg/dL   CBC WITHOUT  DIFFERENTIAL    Collection Time: 06/24/22  6:44 AM   Result Value Ref Range    WBC 8.2 4.8 - 10.8 K/uL    RBC 4.33 4.20 - 5.40 M/uL    Hemoglobin 10.8 (L) 12.0 - 16.0 g/dL    Hematocrit 33.8 (L) 37.0 - 47.0 %    MCV 78.1 (L) 81.4 - 97.8 fL    MCH 24.9 (L) 27.0 - 33.0 pg    MCHC 32.0 (L) 33.6 - 35.0 g/dL    RDW 40.4 35.9 - 50.0 fL    Platelet Count 310 164 - 446 K/uL    MPV 10.6 9.0 - 12.9 fL   URINALYSIS    Collection Time: 06/24/22  6:44 AM   Result Value Ref Range    Color Yellow     Character Cloudy (A)     Specific Gravity 1.018 <1.035    Ph 5.5 5.0 - 8.0    Glucose Negative Negative mg/dL    Ketones Negative Negative mg/dL    Protein 100 (A) Negative mg/dL    Bilirubin Negative Negative    Urobilinogen, Urine 0.2 Negative    Nitrite Negative Negative    Leukocyte Esterase Small (A) Negative    Occult Blood Negative Negative    Micro Urine Req Microscopic    PROTEIN/CREAT RATIO URINE    Collection Time: 06/24/22  6:44 AM   Result Value Ref Range    Total Protein, Urine 91.0 (H) 0.0 - 15.0 mg/dL    Creatinine, Random Urine 137.71 mg/dL    Protein Creatinine Ratio 661 (H) 10 - 107 mg/g   ESTIMATED GFR    Collection Time: 06/24/22  6:44 AM   Result Value Ref Range    GFR (CKD-EPI) 96 >60 mL/min/1.73 m 2   URINE MICROSCOPIC (W/UA)    Collection Time: 06/24/22  6:44 AM   Result Value Ref Range    WBC 20-50 (A) /hpf    RBC 0-2 /hpf    Bacteria Many (A) None /hpf    Epithelial Cells Moderate (A) /hpf    Hyaline Cast 3-5 (A) /lpf   FASTING STATUS    Collection Time: 06/24/22  8:39 AM   Result Value Ref Range    Fasting Status Fasting         Assessment:   Mercy Almeida 35 weeks  Preeclampsia   Labor status: Not in labor.  Obstetrical history significant for   Patient Active Problem List    Diagnosis Date Noted   • Vitamin D deficiency 01/10/2020   • Weakness 01/09/2020   • Meningioma (HCC) 01/09/2020   .      Plan:     Admit to L&D  GBS unknown (collected today at the office)   [facility] labs  Activate  antihypertensive protocol  cytotec for cervical ripening  Pitocin for IOL  Plan of care discussed with her and Rios. Both agreed with POC    Araceli Charles MD

## 2022-06-28 NOTE — PROGRESS NOTES
2015: Pt arrived for induction- Pre-e. Pt states fetal movement is normal, has not had any vaginal bleeding or LOF.     2025: Dr. Charlee Charles at bedside to discuss plan of care. SVE by Dr. Charlee Charles. Awaiting orders for induction once labs result.     2140: Dr. Charlee Charles updated, orders received.     2225: Dr. Charlee Charles at bedside to place cervidil.     2328: Dr. Charlee Charles called about unknown GBS status. No antibiotic therapy at this time, dose to begin with active labor.     0700: Bedside report to Sophie ROPER.

## 2022-06-29 ENCOUNTER — ANESTHESIA EVENT (OUTPATIENT)
Dept: OBGYN | Facility: MEDICAL CENTER | Age: 27
End: 2022-06-29
Payer: COMMERCIAL

## 2022-06-29 ENCOUNTER — ANESTHESIA (OUTPATIENT)
Dept: OBGYN | Facility: MEDICAL CENTER | Age: 27
End: 2022-06-29
Payer: COMMERCIAL

## 2022-06-29 PROBLEM — O13.9 PIH (PREGNANCY INDUCED HYPERTENSION): Status: ACTIVE | Noted: 2022-06-29

## 2022-06-29 PROCEDURE — 700105 HCHG RX REV CODE 258: Performed by: ANESTHESIOLOGY

## 2022-06-29 PROCEDURE — 770002 HCHG ROOM/CARE - OB PRIVATE (112)

## 2022-06-29 PROCEDURE — 700102 HCHG RX REV CODE 250 W/ 637 OVERRIDE(OP): Performed by: OBSTETRICS & GYNECOLOGY

## 2022-06-29 PROCEDURE — 160048 HCHG OR STATISTICAL LEVEL 1-5: Performed by: OBSTETRICS & GYNECOLOGY

## 2022-06-29 PROCEDURE — 700111 HCHG RX REV CODE 636 W/ 250 OVERRIDE (IP): Performed by: ANESTHESIOLOGY

## 2022-06-29 PROCEDURE — 700111 HCHG RX REV CODE 636 W/ 250 OVERRIDE (IP): Performed by: OBSTETRICS & GYNECOLOGY

## 2022-06-29 PROCEDURE — 160009 HCHG ANES TIME/MIN: Performed by: OBSTETRICS & GYNECOLOGY

## 2022-06-29 PROCEDURE — 160029 HCHG SURGERY MINUTES - 1ST 30 MINS LEVEL 4: Performed by: OBSTETRICS & GYNECOLOGY

## 2022-06-29 PROCEDURE — 10907ZC DRAINAGE OF AMNIOTIC FLUID, THERAPEUTIC FROM PRODUCTS OF CONCEPTION, VIA NATURAL OR ARTIFICIAL OPENING: ICD-10-PCS | Performed by: OBSTETRICS & GYNECOLOGY

## 2022-06-29 PROCEDURE — A9270 NON-COVERED ITEM OR SERVICE: HCPCS | Performed by: ANESTHESIOLOGY

## 2022-06-29 PROCEDURE — 01961 ANES CESAREAN DELIVERY ONLY: CPT | Performed by: ANESTHESIOLOGY

## 2022-06-29 PROCEDURE — 88307 TISSUE EXAM BY PATHOLOGIST: CPT

## 2022-06-29 PROCEDURE — 160041 HCHG SURGERY MINUTES - EA ADDL 1 MIN LEVEL 4: Performed by: OBSTETRICS & GYNECOLOGY

## 2022-06-29 PROCEDURE — 700102 HCHG RX REV CODE 250 W/ 637 OVERRIDE(OP): Performed by: ANESTHESIOLOGY

## 2022-06-29 PROCEDURE — C1755 CATHETER, INTRASPINAL: HCPCS | Performed by: OBSTETRICS & GYNECOLOGY

## 2022-06-29 PROCEDURE — 700101 HCHG RX REV CODE 250: Performed by: ANESTHESIOLOGY

## 2022-06-29 PROCEDURE — 160035 HCHG PACU - 1ST 60 MINS PHASE I: Performed by: OBSTETRICS & GYNECOLOGY

## 2022-06-29 PROCEDURE — 160002 HCHG RECOVERY MINUTES (STAT): Performed by: OBSTETRICS & GYNECOLOGY

## 2022-06-29 PROCEDURE — A9270 NON-COVERED ITEM OR SERVICE: HCPCS | Performed by: OBSTETRICS & GYNECOLOGY

## 2022-06-29 RX ORDER — IBUPROFEN 800 MG/1
800 TABLET ORAL EVERY 8 HOURS
Status: DISCONTINUED | OUTPATIENT
Start: 2022-06-30 | End: 2022-07-02 | Stop reason: HOSPADM

## 2022-06-29 RX ORDER — OXYCODONE HYDROCHLORIDE 10 MG/1
10 TABLET ORAL EVERY 4 HOURS PRN
Status: DISCONTINUED | OUTPATIENT
Start: 2022-06-29 | End: 2022-06-30

## 2022-06-29 RX ORDER — MISOPROSTOL 200 UG/1
800 TABLET ORAL
Status: DISCONTINUED | OUTPATIENT
Start: 2022-06-29 | End: 2022-07-02 | Stop reason: HOSPADM

## 2022-06-29 RX ORDER — OXYCODONE HYDROCHLORIDE 10 MG/1
10 TABLET ORAL EVERY 4 HOURS PRN
Status: DISCONTINUED | OUTPATIENT
Start: 2022-06-30 | End: 2022-07-02 | Stop reason: HOSPADM

## 2022-06-29 RX ORDER — SODIUM CHLORIDE, SODIUM LACTATE, POTASSIUM CHLORIDE, CALCIUM CHLORIDE 600; 310; 30; 20 MG/100ML; MG/100ML; MG/100ML; MG/100ML
INJECTION, SOLUTION INTRAVENOUS ONCE
Status: DISCONTINUED | OUTPATIENT
Start: 2022-06-29 | End: 2022-06-29

## 2022-06-29 RX ORDER — DEXAMETHASONE SODIUM PHOSPHATE 4 MG/ML
INJECTION, SOLUTION INTRA-ARTICULAR; INTRALESIONAL; INTRAMUSCULAR; INTRAVENOUS; SOFT TISSUE PRN
Status: DISCONTINUED | OUTPATIENT
Start: 2022-06-29 | End: 2022-06-29 | Stop reason: SURG

## 2022-06-29 RX ORDER — MORPHINE SULFATE 0.5 MG/ML
INJECTION, SOLUTION EPIDURAL; INTRATHECAL; INTRAVENOUS
Status: COMPLETED | OUTPATIENT
Start: 2022-06-29 | End: 2022-06-29

## 2022-06-29 RX ORDER — OXYTOCIN 10 [USP'U]/ML
10 INJECTION, SOLUTION INTRAMUSCULAR; INTRAVENOUS
Status: DISCONTINUED | OUTPATIENT
Start: 2022-06-29 | End: 2022-07-02 | Stop reason: HOSPADM

## 2022-06-29 RX ORDER — OXYTOCIN 10 [USP'U]/ML
10 INJECTION, SOLUTION INTRAMUSCULAR; INTRAVENOUS
Status: DISCONTINUED | OUTPATIENT
Start: 2022-06-29 | End: 2022-06-29

## 2022-06-29 RX ORDER — SODIUM CHLORIDE, SODIUM LACTATE, POTASSIUM CHLORIDE, CALCIUM CHLORIDE 600; 310; 30; 20 MG/100ML; MG/100ML; MG/100ML; MG/100ML
INJECTION, SOLUTION INTRAVENOUS CONTINUOUS
Status: CANCELLED | OUTPATIENT
Start: 2022-06-29

## 2022-06-29 RX ORDER — DIPHENHYDRAMINE HYDROCHLORIDE 50 MG/ML
12.5 INJECTION INTRAMUSCULAR; INTRAVENOUS
Status: DISCONTINUED | OUTPATIENT
Start: 2022-06-29 | End: 2022-06-29 | Stop reason: HOSPADM

## 2022-06-29 RX ORDER — DIPHENHYDRAMINE HYDROCHLORIDE 50 MG/ML
12.5 INJECTION INTRAMUSCULAR; INTRAVENOUS EVERY 6 HOURS PRN
Status: DISCONTINUED | OUTPATIENT
Start: 2022-06-29 | End: 2022-06-30

## 2022-06-29 RX ORDER — LABETALOL 100 MG/1
100 TABLET, FILM COATED ORAL TWICE DAILY
Status: DISCONTINUED | OUTPATIENT
Start: 2022-06-30 | End: 2022-07-02 | Stop reason: HOSPADM

## 2022-06-29 RX ORDER — METOPROLOL TARTRATE 1 MG/ML
1 INJECTION, SOLUTION INTRAVENOUS
Status: DISCONTINUED | OUTPATIENT
Start: 2022-06-29 | End: 2022-06-29 | Stop reason: HOSPADM

## 2022-06-29 RX ORDER — MEPERIDINE HYDROCHLORIDE 25 MG/ML
12.5 INJECTION INTRAMUSCULAR; INTRAVENOUS; SUBCUTANEOUS
Status: DISCONTINUED | OUTPATIENT
Start: 2022-06-29 | End: 2022-06-29 | Stop reason: HOSPADM

## 2022-06-29 RX ORDER — CITRIC ACID/SODIUM CITRATE 334-500MG
30 SOLUTION, ORAL ORAL ONCE
Status: COMPLETED | OUTPATIENT
Start: 2022-06-29 | End: 2022-06-29

## 2022-06-29 RX ORDER — ONDANSETRON 4 MG/1
4 TABLET, ORALLY DISINTEGRATING ORAL EVERY 6 HOURS PRN
Status: DISCONTINUED | OUTPATIENT
Start: 2022-06-30 | End: 2022-07-02 | Stop reason: HOSPADM

## 2022-06-29 RX ORDER — BUPIVACAINE HYDROCHLORIDE 7.5 MG/ML
INJECTION, SOLUTION INTRASPINAL
Status: COMPLETED | OUTPATIENT
Start: 2022-06-29 | End: 2022-06-29

## 2022-06-29 RX ORDER — OXYCODONE HCL 5 MG/5 ML
5 SOLUTION, ORAL ORAL
Status: DISCONTINUED | OUTPATIENT
Start: 2022-06-29 | End: 2022-06-29 | Stop reason: HOSPADM

## 2022-06-29 RX ORDER — IBUPROFEN 800 MG/1
800 TABLET ORAL EVERY 8 HOURS PRN
Status: DISCONTINUED | OUTPATIENT
Start: 2022-07-03 | End: 2022-07-02 | Stop reason: HOSPADM

## 2022-06-29 RX ORDER — HYDROMORPHONE HYDROCHLORIDE 1 MG/ML
0.2 INJECTION, SOLUTION INTRAMUSCULAR; INTRAVENOUS; SUBCUTANEOUS
Status: DISCONTINUED | OUTPATIENT
Start: 2022-06-29 | End: 2022-06-30

## 2022-06-29 RX ORDER — HYDROMORPHONE HYDROCHLORIDE 1 MG/ML
0.2 INJECTION, SOLUTION INTRAMUSCULAR; INTRAVENOUS; SUBCUTANEOUS
Status: DISCONTINUED | OUTPATIENT
Start: 2022-06-29 | End: 2022-06-29 | Stop reason: HOSPADM

## 2022-06-29 RX ORDER — SODIUM CHLORIDE, SODIUM LACTATE, POTASSIUM CHLORIDE, CALCIUM CHLORIDE 600; 310; 30; 20 MG/100ML; MG/100ML; MG/100ML; MG/100ML
INJECTION, SOLUTION INTRAVENOUS PRN
Status: DISCONTINUED | OUTPATIENT
Start: 2022-06-29 | End: 2022-07-02 | Stop reason: HOSPADM

## 2022-06-29 RX ORDER — DIPHENHYDRAMINE HYDROCHLORIDE 50 MG/ML
25 INJECTION INTRAMUSCULAR; INTRAVENOUS EVERY 6 HOURS PRN
Status: DISCONTINUED | OUTPATIENT
Start: 2022-06-29 | End: 2022-06-30

## 2022-06-29 RX ORDER — KETOROLAC TROMETHAMINE 30 MG/ML
30 INJECTION, SOLUTION INTRAMUSCULAR; INTRAVENOUS EVERY 6 HOURS
Status: DISCONTINUED | OUTPATIENT
Start: 2022-06-30 | End: 2022-06-30

## 2022-06-29 RX ORDER — SODIUM CHLORIDE, SODIUM GLUCONATE, SODIUM ACETATE, POTASSIUM CHLORIDE AND MAGNESIUM CHLORIDE 526; 502; 368; 37; 30 MG/100ML; MG/100ML; MG/100ML; MG/100ML; MG/100ML
INJECTION, SOLUTION INTRAVENOUS
Status: DISCONTINUED | OUTPATIENT
Start: 2022-06-29 | End: 2022-06-29 | Stop reason: SURG

## 2022-06-29 RX ORDER — HYDROMORPHONE HYDROCHLORIDE 1 MG/ML
0.1 INJECTION, SOLUTION INTRAMUSCULAR; INTRAVENOUS; SUBCUTANEOUS
Status: DISCONTINUED | OUTPATIENT
Start: 2022-06-29 | End: 2022-06-29 | Stop reason: HOSPADM

## 2022-06-29 RX ORDER — ACETAMINOPHEN 500 MG
1000 TABLET ORAL EVERY 6 HOURS
Status: DISCONTINUED | OUTPATIENT
Start: 2022-06-30 | End: 2022-07-02 | Stop reason: HOSPADM

## 2022-06-29 RX ORDER — OXYCODONE HYDROCHLORIDE 5 MG/1
5 TABLET ORAL EVERY 4 HOURS PRN
Status: DISCONTINUED | OUTPATIENT
Start: 2022-06-29 | End: 2022-06-30

## 2022-06-29 RX ORDER — METOCLOPRAMIDE HYDROCHLORIDE 5 MG/ML
10 INJECTION INTRAMUSCULAR; INTRAVENOUS ONCE
Status: COMPLETED | OUTPATIENT
Start: 2022-06-29 | End: 2022-06-29

## 2022-06-29 RX ORDER — ONDANSETRON 2 MG/ML
4 INJECTION INTRAMUSCULAR; INTRAVENOUS
Status: DISCONTINUED | OUTPATIENT
Start: 2022-06-29 | End: 2022-06-29 | Stop reason: HOSPADM

## 2022-06-29 RX ORDER — OXYTOCIN 10 [USP'U]/ML
10 INJECTION, SOLUTION INTRAMUSCULAR; INTRAVENOUS
Status: CANCELLED | OUTPATIENT
Start: 2022-06-29

## 2022-06-29 RX ORDER — DOCUSATE SODIUM 100 MG/1
100 CAPSULE, LIQUID FILLED ORAL 2 TIMES DAILY PRN
Status: DISCONTINUED | OUTPATIENT
Start: 2022-06-29 | End: 2022-07-02 | Stop reason: HOSPADM

## 2022-06-29 RX ORDER — ONDANSETRON 2 MG/ML
4 INJECTION INTRAMUSCULAR; INTRAVENOUS EVERY 6 HOURS PRN
Status: DISCONTINUED | OUTPATIENT
Start: 2022-06-30 | End: 2022-07-02 | Stop reason: HOSPADM

## 2022-06-29 RX ORDER — HYDROMORPHONE HYDROCHLORIDE 1 MG/ML
0.4 INJECTION, SOLUTION INTRAMUSCULAR; INTRAVENOUS; SUBCUTANEOUS
Status: DISCONTINUED | OUTPATIENT
Start: 2022-06-29 | End: 2022-06-29 | Stop reason: HOSPADM

## 2022-06-29 RX ORDER — MISOPROSTOL 200 UG/1
800 TABLET ORAL
Status: DISCONTINUED | OUTPATIENT
Start: 2022-06-29 | End: 2022-06-29

## 2022-06-29 RX ORDER — HALOPERIDOL 5 MG/ML
1 INJECTION INTRAMUSCULAR
Status: DISCONTINUED | OUTPATIENT
Start: 2022-06-29 | End: 2022-06-29 | Stop reason: HOSPADM

## 2022-06-29 RX ORDER — ACETAMINOPHEN 500 MG
1000 TABLET ORAL EVERY 6 HOURS
Status: DISPENSED | OUTPATIENT
Start: 2022-06-29 | End: 2022-06-30

## 2022-06-29 RX ORDER — CEFAZOLIN SODIUM 1 G/3ML
2 INJECTION, POWDER, FOR SOLUTION INTRAMUSCULAR; INTRAVENOUS ONCE
Status: CANCELLED | OUTPATIENT
Start: 2022-06-29 | End: 2022-06-29

## 2022-06-29 RX ORDER — SODIUM CHLORIDE, SODIUM LACTATE, POTASSIUM CHLORIDE, CALCIUM CHLORIDE 600; 310; 30; 20 MG/100ML; MG/100ML; MG/100ML; MG/100ML
INJECTION, SOLUTION INTRAVENOUS ONCE
Status: CANCELLED | OUTPATIENT
Start: 2022-06-29 | End: 2022-06-29

## 2022-06-29 RX ORDER — CEFAZOLIN SODIUM 1 G/3ML
2 INJECTION, POWDER, FOR SOLUTION INTRAMUSCULAR; INTRAVENOUS ONCE
Status: COMPLETED | OUTPATIENT
Start: 2022-06-29 | End: 2022-06-29

## 2022-06-29 RX ORDER — SODIUM CHLORIDE, SODIUM GLUCONATE, SODIUM ACETATE, POTASSIUM CHLORIDE AND MAGNESIUM CHLORIDE 526; 502; 368; 37; 30 MG/100ML; MG/100ML; MG/100ML; MG/100ML; MG/100ML
1000 INJECTION, SOLUTION INTRAVENOUS ONCE
Status: COMPLETED | OUTPATIENT
Start: 2022-06-29 | End: 2022-06-29

## 2022-06-29 RX ORDER — DIPHENHYDRAMINE HCL 25 MG
25 TABLET ORAL EVERY 6 HOURS PRN
Status: DISCONTINUED | OUTPATIENT
Start: 2022-06-30 | End: 2022-07-02 | Stop reason: HOSPADM

## 2022-06-29 RX ORDER — SODIUM CHLORIDE, SODIUM LACTATE, POTASSIUM CHLORIDE, CALCIUM CHLORIDE 600; 310; 30; 20 MG/100ML; MG/100ML; MG/100ML; MG/100ML
INJECTION, SOLUTION INTRAVENOUS CONTINUOUS
Status: DISCONTINUED | OUTPATIENT
Start: 2022-06-29 | End: 2022-06-29

## 2022-06-29 RX ORDER — SODIUM CHLORIDE, SODIUM LACTATE, POTASSIUM CHLORIDE, CALCIUM CHLORIDE 600; 310; 30; 20 MG/100ML; MG/100ML; MG/100ML; MG/100ML
INJECTION, SOLUTION INTRAVENOUS CONTINUOUS
Status: DISCONTINUED | OUTPATIENT
Start: 2022-06-29 | End: 2022-07-02 | Stop reason: HOSPADM

## 2022-06-29 RX ORDER — ACETAMINOPHEN 500 MG
1000 TABLET ORAL EVERY 6 HOURS PRN
Status: DISCONTINUED | OUTPATIENT
Start: 2022-07-03 | End: 2022-07-02 | Stop reason: HOSPADM

## 2022-06-29 RX ORDER — OXYTOCIN 10 [USP'U]/ML
INJECTION, SOLUTION INTRAMUSCULAR; INTRAVENOUS PRN
Status: DISCONTINUED | OUTPATIENT
Start: 2022-06-29 | End: 2022-06-29 | Stop reason: SURG

## 2022-06-29 RX ORDER — IPRATROPIUM BROMIDE AND ALBUTEROL SULFATE 2.5; .5 MG/3ML; MG/3ML
3 SOLUTION RESPIRATORY (INHALATION)
Status: DISCONTINUED | OUTPATIENT
Start: 2022-06-29 | End: 2022-06-29 | Stop reason: HOSPADM

## 2022-06-29 RX ORDER — HYDRALAZINE HYDROCHLORIDE 20 MG/ML
5 INJECTION INTRAMUSCULAR; INTRAVENOUS
Status: DISCONTINUED | OUTPATIENT
Start: 2022-06-29 | End: 2022-06-29 | Stop reason: HOSPADM

## 2022-06-29 RX ORDER — KETOROLAC TROMETHAMINE 30 MG/ML
INJECTION, SOLUTION INTRAMUSCULAR; INTRAVENOUS PRN
Status: DISCONTINUED | OUTPATIENT
Start: 2022-06-29 | End: 2022-06-29 | Stop reason: SURG

## 2022-06-29 RX ORDER — MIDAZOLAM HYDROCHLORIDE 1 MG/ML
1 INJECTION INTRAMUSCULAR; INTRAVENOUS
Status: DISCONTINUED | OUTPATIENT
Start: 2022-06-29 | End: 2022-06-29 | Stop reason: HOSPADM

## 2022-06-29 RX ORDER — HYDROMORPHONE HYDROCHLORIDE 1 MG/ML
0.4 INJECTION, SOLUTION INTRAMUSCULAR; INTRAVENOUS; SUBCUTANEOUS
Status: DISCONTINUED | OUTPATIENT
Start: 2022-06-29 | End: 2022-06-30

## 2022-06-29 RX ORDER — ONDANSETRON 2 MG/ML
INJECTION INTRAMUSCULAR; INTRAVENOUS PRN
Status: DISCONTINUED | OUTPATIENT
Start: 2022-06-29 | End: 2022-06-29 | Stop reason: SURG

## 2022-06-29 RX ORDER — OXYCODONE HCL 5 MG/5 ML
10 SOLUTION, ORAL ORAL
Status: DISCONTINUED | OUTPATIENT
Start: 2022-06-29 | End: 2022-06-29 | Stop reason: HOSPADM

## 2022-06-29 RX ORDER — ONDANSETRON 2 MG/ML
4 INJECTION INTRAMUSCULAR; INTRAVENOUS EVERY 6 HOURS PRN
Status: DISCONTINUED | OUTPATIENT
Start: 2022-06-29 | End: 2022-06-30

## 2022-06-29 RX ORDER — DIPHENHYDRAMINE HYDROCHLORIDE 50 MG/ML
25 INJECTION INTRAMUSCULAR; INTRAVENOUS EVERY 6 HOURS PRN
Status: DISCONTINUED | OUTPATIENT
Start: 2022-06-30 | End: 2022-07-02 | Stop reason: HOSPADM

## 2022-06-29 RX ORDER — LABETALOL HYDROCHLORIDE 5 MG/ML
5 INJECTION, SOLUTION INTRAVENOUS
Status: DISCONTINUED | OUTPATIENT
Start: 2022-06-29 | End: 2022-06-29 | Stop reason: HOSPADM

## 2022-06-29 RX ORDER — OXYCODONE HYDROCHLORIDE 5 MG/1
5 TABLET ORAL EVERY 4 HOURS PRN
Status: DISCONTINUED | OUTPATIENT
Start: 2022-06-30 | End: 2022-07-02 | Stop reason: HOSPADM

## 2022-06-29 RX ADMIN — METOCLOPRAMIDE 10 MG: 5 INJECTION, SOLUTION INTRAMUSCULAR; INTRAVENOUS at 15:48

## 2022-06-29 RX ADMIN — SODIUM CHLORIDE, SODIUM GLUCONATE, SODIUM ACETATE, POTASSIUM CHLORIDE AND MAGNESIUM CHLORIDE: 526; 502; 368; 37; 30 INJECTION, SOLUTION INTRAVENOUS at 17:39

## 2022-06-29 RX ADMIN — SODIUM CITRATE AND CITRIC ACID MONOHYDRATE 30 ML: 500; 334 SOLUTION ORAL at 15:47

## 2022-06-29 RX ADMIN — KETOROLAC TROMETHAMINE 30 MG: 30 INJECTION, SOLUTION INTRAMUSCULAR at 17:52

## 2022-06-29 RX ADMIN — PHENYLEPHRINE HYDROCHLORIDE 10 MCG/MIN: 10 INJECTION INTRAVENOUS at 17:04

## 2022-06-29 RX ADMIN — OXYTOCIN 125 ML/HR: 10 INJECTION, SOLUTION INTRAMUSCULAR; INTRAVENOUS at 20:05

## 2022-06-29 RX ADMIN — SODIUM CHLORIDE, SODIUM GLUCONATE, SODIUM ACETATE, POTASSIUM CHLORIDE AND MAGNESIUM CHLORIDE: 526; 502; 368; 37; 30 INJECTION, SOLUTION INTRAVENOUS at 17:02

## 2022-06-29 RX ADMIN — DEXAMETHASONE SODIUM PHOSPHATE 8 MG: 4 INJECTION, SOLUTION INTRA-ARTICULAR; INTRALESIONAL; INTRAMUSCULAR; INTRAVENOUS; SOFT TISSUE at 17:08

## 2022-06-29 RX ADMIN — SODIUM CHLORIDE, SODIUM GLUCONATE, SODIUM ACETATE, POTASSIUM CHLORIDE AND MAGNESIUM CHLORIDE 1000 ML: 526; 502; 368; 37; 30 INJECTION, SOLUTION INTRAVENOUS at 15:45

## 2022-06-29 RX ADMIN — ONDANSETRON 4 MG: 2 INJECTION INTRAMUSCULAR; INTRAVENOUS at 17:41

## 2022-06-29 RX ADMIN — CEFAZOLIN 2 G: 330 INJECTION, POWDER, FOR SOLUTION INTRAMUSCULAR; INTRAVENOUS at 17:06

## 2022-06-29 RX ADMIN — ACETAMINOPHEN 1000 MG: 500 TABLET, FILM COATED ORAL at 22:17

## 2022-06-29 RX ADMIN — OXYTOCIN 20 UNITS: 10 INJECTION, SOLUTION INTRAMUSCULAR; INTRAVENOUS at 17:39

## 2022-06-29 RX ADMIN — MISOPROSTOL 25 MCG: 100 TABLET ORAL at 07:30

## 2022-06-29 RX ADMIN — Medication 125 ML/HR: at 18:05

## 2022-06-29 RX ADMIN — MORPHINE SULFATE 150 MCG: 0.5 INJECTION, SOLUTION EPIDURAL; INTRATHECAL; INTRAVENOUS at 17:12

## 2022-06-29 RX ADMIN — LABETALOL HYDROCHLORIDE 100 MG: 100 TABLET, FILM COATED ORAL at 05:58

## 2022-06-29 RX ADMIN — BUPIVACAINE HYDROCHLORIDE IN DEXTROSE 2 ML: 7.5 INJECTION, SOLUTION SUBARACHNOID at 17:12

## 2022-06-29 RX ADMIN — LABETALOL HYDROCHLORIDE 100 MG: 100 TABLET, FILM COATED ORAL at 20:15

## 2022-06-29 RX ADMIN — FAMOTIDINE 20 MG: 10 INJECTION INTRAVENOUS at 15:47

## 2022-06-29 ASSESSMENT — PAIN DESCRIPTION - PAIN TYPE
TYPE: ACUTE PAIN

## 2022-06-29 NOTE — PROGRESS NOTES
L&D Progress Note    Name:   Mercy Almeida   Date/Time:  6/29/2022 7:57 AM  Gestational Age:  35w6d  Admit Date:   6/27/2022  Admitting Dx:   Encounter for induction of labor [Z34.90]  35 weeks   Preeclampsia   Subjective:  (+) mild headache  No visual changes  No RUQ pain     Objective:   Vitals:    06/29/22 0014 06/29/22 0221 06/29/22 0406 06/29/22 0529   BP: (!) 143/89 (!) 142/93 133/77 (!) 143/94   Pulse: 84 73 68 70   Resp:       Temp: 36.9 °C (98.4 °F)  36.8 °C (98.2 °F)    TempSrc: Temporal  Temporal    Weight:       Height:         Category I  toco no activity to irritable     Cervix FT/50/high/soft    Meds:   Epidural : .  no  Magnesium sulfate: . no  Pitocin: .  no    Labs:  Recent Results (from the past 72 hour(s))   Hold Blood Bank Specimen (Not Tested)    Collection Time: 06/27/22  8:40 PM   Result Value Ref Range    Holding Tube - Bb DONE    CBC with differential    Collection Time: 06/27/22  8:40 PM   Result Value Ref Range    WBC 10.4 4.8 - 10.8 K/uL    RBC 4.55 4.20 - 5.40 M/uL    Hemoglobin 11.4 (L) 12.0 - 16.0 g/dL    Hematocrit 35.3 (L) 37.0 - 47.0 %    MCV 77.6 (L) 81.4 - 97.8 fL    MCH 25.1 (L) 27.0 - 33.0 pg    MCHC 32.3 (L) 33.6 - 35.0 g/dL    RDW 40.7 35.9 - 50.0 fL    Platelet Count 319 164 - 446 K/uL    MPV 10.2 9.0 - 12.9 fL    Neutrophils-Polys 70.00 44.00 - 72.00 %    Lymphocytes 22.90 22.00 - 41.00 %    Monocytes 5.70 0.00 - 13.40 %    Eosinophils 0.70 0.00 - 6.90 %    Basophils 0.20 0.00 - 1.80 %    Immature Granulocytes 0.50 0.00 - 0.90 %    Nucleated RBC 0.00 /100 WBC    Neutrophils (Absolute) 7.27 (H) 2.00 - 7.15 K/uL    Lymphs (Absolute) 2.37 1.00 - 4.80 K/uL    Monos (Absolute) 0.59 0.00 - 0.85 K/uL    Eos (Absolute) 0.07 0.00 - 0.51 K/uL    Baso (Absolute) 0.02 0.00 - 0.12 K/uL    Immature Granulocytes (abs) 0.05 0.00 - 0.11 K/uL    NRBC (Absolute) 0.00 K/uL   Comp Metabolic Panel    Collection Time: 06/27/22  8:40 PM   Result Value Ref Range    Sodium 140 135 - 145  mmol/L    Potassium 4.3 3.6 - 5.5 mmol/L    Chloride 109 96 - 112 mmol/L    Co2 18 (L) 20 - 33 mmol/L    Anion Gap 13.0 7.0 - 16.0    Glucose 75 65 - 99 mg/dL    Bun 18 8 - 22 mg/dL    Creatinine 0.85 0.50 - 1.40 mg/dL    Calcium 9.4 8.5 - 10.5 mg/dL    AST(SGOT) 26 12 - 45 U/L    ALT(SGPT) 26 2 - 50 U/L    Alkaline Phosphatase 111 (H) 30 - 99 U/L    Total Bilirubin 0.2 0.1 - 1.5 mg/dL    Albumin 3.7 3.2 - 4.9 g/dL    Total Protein 6.4 6.0 - 8.2 g/dL    Globulin 2.7 1.9 - 3.5 g/dL    A-G Ratio 1.4 g/dL   PROTEIN/CREAT RATIO URINE    Collection Time: 06/27/22  8:40 PM   Result Value Ref Range    Total Protein, Urine 524.0 (H) 0.0 - 15.0 mg/dL    Creatinine, Random Urine 201.09 mg/dL    Protein Creatinine Ratio 2606 (H) 10 - 107 mg/g   ESTIMATED GFR    Collection Time: 06/27/22  8:40 PM   Result Value Ref Range    GFR (CKD-EPI) 96 >60 mL/min/1.73 m 2     Assessment:   Gestational Age:   35w6d  Risk Factors:   pre-eclampsia  Labor State:    Not in labor    cytotec # 3 placed  Reassess at 12 nn    Patient Active Problem List    Diagnosis Date Noted   • Encounter for induction of labor 06/27/2022   • Vitamin D deficiency 01/10/2020   • Weakness 01/09/2020   • Meningioma (HCC) 01/09/2020       Araceli Cohen M.D.

## 2022-06-29 NOTE — PROGRESS NOTES
L&D Progress Note    Name:   Mercy Almeida   Date/Time:  6/28/2022 6:06 PM  Gestational Age:  35w5d  Admit Date:   6/27/2022  Admitting Dx:   Encounter for induction of labor [Z34.90]  Preeclampsia   Subjective:  No complaints    Objective:   Vitals:    06/28/22 1434 06/28/22 1515 06/28/22 1615 06/28/22 1749   BP: (!) 156/94 (!) 158/90 (!) 140/92 (!) 151/93   Pulse: 69 68 65 73   Resp:       Temp:    36.5 °C (97.7 °F)   TempSrc:    Oral   Weight:       Height:         Category I  toco irritable  Cervix 0.5/long/-3/soft/posterior  Meds:   Labs:  Recent Results (from the past 72 hour(s))   Hold Blood Bank Specimen (Not Tested)    Collection Time: 06/27/22  8:40 PM   Result Value Ref Range    Holding Tube - Bb DONE    CBC with differential    Collection Time: 06/27/22  8:40 PM   Result Value Ref Range    WBC 10.4 4.8 - 10.8 K/uL    RBC 4.55 4.20 - 5.40 M/uL    Hemoglobin 11.4 (L) 12.0 - 16.0 g/dL    Hematocrit 35.3 (L) 37.0 - 47.0 %    MCV 77.6 (L) 81.4 - 97.8 fL    MCH 25.1 (L) 27.0 - 33.0 pg    MCHC 32.3 (L) 33.6 - 35.0 g/dL    RDW 40.7 35.9 - 50.0 fL    Platelet Count 319 164 - 446 K/uL    MPV 10.2 9.0 - 12.9 fL    Neutrophils-Polys 70.00 44.00 - 72.00 %    Lymphocytes 22.90 22.00 - 41.00 %    Monocytes 5.70 0.00 - 13.40 %    Eosinophils 0.70 0.00 - 6.90 %    Basophils 0.20 0.00 - 1.80 %    Immature Granulocytes 0.50 0.00 - 0.90 %    Nucleated RBC 0.00 /100 WBC    Neutrophils (Absolute) 7.27 (H) 2.00 - 7.15 K/uL    Lymphs (Absolute) 2.37 1.00 - 4.80 K/uL    Monos (Absolute) 0.59 0.00 - 0.85 K/uL    Eos (Absolute) 0.07 0.00 - 0.51 K/uL    Baso (Absolute) 0.02 0.00 - 0.12 K/uL    Immature Granulocytes (abs) 0.05 0.00 - 0.11 K/uL    NRBC (Absolute) 0.00 K/uL   Comp Metabolic Panel    Collection Time: 06/27/22  8:40 PM   Result Value Ref Range    Sodium 140 135 - 145 mmol/L    Potassium 4.3 3.6 - 5.5 mmol/L    Chloride 109 96 - 112 mmol/L    Co2 18 (L) 20 - 33 mmol/L    Anion Gap 13.0 7.0 - 16.0    Glucose  75 65 - 99 mg/dL    Bun 18 8 - 22 mg/dL    Creatinine 0.85 0.50 - 1.40 mg/dL    Calcium 9.4 8.5 - 10.5 mg/dL    AST(SGOT) 26 12 - 45 U/L    ALT(SGPT) 26 2 - 50 U/L    Alkaline Phosphatase 111 (H) 30 - 99 U/L    Total Bilirubin 0.2 0.1 - 1.5 mg/dL    Albumin 3.7 3.2 - 4.9 g/dL    Total Protein 6.4 6.0 - 8.2 g/dL    Globulin 2.7 1.9 - 3.5 g/dL    A-G Ratio 1.4 g/dL   PROTEIN/CREAT RATIO URINE    Collection Time: 06/27/22  8:40 PM   Result Value Ref Range    Total Protein, Urine 524.0 (H) 0.0 - 15.0 mg/dL    Creatinine, Random Urine 201.09 mg/dL    Protein Creatinine Ratio 2606 (H) 10 - 107 mg/g   ESTIMATED GFR    Collection Time: 06/27/22  8:40 PM   Result Value Ref Range    GFR (CKD-EPI) 96 >60 mL/min/1.73 m 2     Assessment:   Gestational Age:   35w5d  Risk Factors:   pre-eclampsia  Labor State:    Not in labor.    cytotec # 2 placed at 6 PM  Patient Active Problem List    Diagnosis Date Noted   • Encounter for induction of labor 06/27/2022   • Vitamin D deficiency 01/10/2020   • Weakness 01/09/2020   • Meningioma (HCC) 01/09/2020       Araceli Cohen M.D.

## 2022-06-29 NOTE — PROGRESS NOTES
L&D Progress Note    Name:   Mercy Almeida   Date/Time:  6/29/2022 4:39 PM  Gestational Age:  35w6d  Admit Date:   6/27/2022  Admitting Dx:   Encounter for induction of labor [Z34.90]  Preeclampsia   Subjective:  Comfortable   Objective:   Vitals:    06/29/22 0529 06/29/22 0718 06/29/22 1017 06/29/22 1139   BP: (!) 143/94 (!) 135/98 118/68 (!) 144/99   Pulse: 70 81 68 67   Resp:       Temp:  36.7 °C (98.1 °F)  36.8 °C (98.2 °F)   TempSrc:  Temporal  Temporal   Weight:       Height:         Cat I  toco no activity    Cervix no change  FT/50/high/soft/posterior    Meds:     Labs:  Recent Results (from the past 72 hour(s))   Hold Blood Bank Specimen (Not Tested)    Collection Time: 06/27/22  8:40 PM   Result Value Ref Range    Holding Tube - Bb DONE    CBC with differential    Collection Time: 06/27/22  8:40 PM   Result Value Ref Range    WBC 10.4 4.8 - 10.8 K/uL    RBC 4.55 4.20 - 5.40 M/uL    Hemoglobin 11.4 (L) 12.0 - 16.0 g/dL    Hematocrit 35.3 (L) 37.0 - 47.0 %    MCV 77.6 (L) 81.4 - 97.8 fL    MCH 25.1 (L) 27.0 - 33.0 pg    MCHC 32.3 (L) 33.6 - 35.0 g/dL    RDW 40.7 35.9 - 50.0 fL    Platelet Count 319 164 - 446 K/uL    MPV 10.2 9.0 - 12.9 fL    Neutrophils-Polys 70.00 44.00 - 72.00 %    Lymphocytes 22.90 22.00 - 41.00 %    Monocytes 5.70 0.00 - 13.40 %    Eosinophils 0.70 0.00 - 6.90 %    Basophils 0.20 0.00 - 1.80 %    Immature Granulocytes 0.50 0.00 - 0.90 %    Nucleated RBC 0.00 /100 WBC    Neutrophils (Absolute) 7.27 (H) 2.00 - 7.15 K/uL    Lymphs (Absolute) 2.37 1.00 - 4.80 K/uL    Monos (Absolute) 0.59 0.00 - 0.85 K/uL    Eos (Absolute) 0.07 0.00 - 0.51 K/uL    Baso (Absolute) 0.02 0.00 - 0.12 K/uL    Immature Granulocytes (abs) 0.05 0.00 - 0.11 K/uL    NRBC (Absolute) 0.00 K/uL   Comp Metabolic Panel    Collection Time: 06/27/22  8:40 PM   Result Value Ref Range    Sodium 140 135 - 145 mmol/L    Potassium 4.3 3.6 - 5.5 mmol/L    Chloride 109 96 - 112 mmol/L    Co2 18 (L) 20 - 33 mmol/L     Anion Gap 13.0 7.0 - 16.0    Glucose 75 65 - 99 mg/dL    Bun 18 8 - 22 mg/dL    Creatinine 0.85 0.50 - 1.40 mg/dL    Calcium 9.4 8.5 - 10.5 mg/dL    AST(SGOT) 26 12 - 45 U/L    ALT(SGPT) 26 2 - 50 U/L    Alkaline Phosphatase 111 (H) 30 - 99 U/L    Total Bilirubin 0.2 0.1 - 1.5 mg/dL    Albumin 3.7 3.2 - 4.9 g/dL    Total Protein 6.4 6.0 - 8.2 g/dL    Globulin 2.7 1.9 - 3.5 g/dL    A-G Ratio 1.4 g/dL   PROTEIN/CREAT RATIO URINE    Collection Time: 22  8:40 PM   Result Value Ref Range    Total Protein, Urine 524.0 (H) 0.0 - 15.0 mg/dL    Creatinine, Random Urine 201.09 mg/dL    Protein Creatinine Ratio 2606 (H) 10 - 107 mg/g   ESTIMATED GFR    Collection Time: 22  8:40 PM   Result Value Ref Range    GFR (CKD-EPI) 96 >60 mL/min/1.73 m 2     Assessment:   Gestational Age:   35w6d  Risk Factors:   pre-eclampsia  Labor State:    Not in labor.    Failed IOL   Pt desires to have a csec    Discussed with the patient indications for  delivery. The patient voiced understanding of indications for  section at this time.    Discussed with the patient the risks of  delivery. The risks include infection, bleeding, scarring, damage to other organs in the area of operation. Specifically organs that can be damaged are bowel, bladder, ureters. I also discussed with the patient the risk of wound infection and wound breakdown. We discussed that these risks are greater in people that have diabetes or obesity. I also discussed the risk of emergency blood transfusion during procedure as well as emergency hysterectomy during procedure.    Patient had the opportunity to ask questions regarding procedures. All questions answered to the patient's satisfaction.  Proceed with  delivery     Patient Active Problem List    Diagnosis Date Noted   • Encounter for induction of labor 2022   • Vitamin D deficiency 01/10/2020   • Weakness 2020   • Meningioma (HCC) 2020       Araceli Deshpande  DARRYL Cohen.

## 2022-06-29 NOTE — ANESTHESIA PREPROCEDURE EVALUATION
Case: [id number] Date/Time: 22 1625    Procedure:  SECTION, PRIMARY (Abdomen)    Anesthesia type: Spinal    Pre-op diagnosis: failure to progress    Location: [facility] OR 01 / SURGERY LABOR AND DELIVERY    Surgeons: Araceli Cohen M.D.          Relevant Problems   CARDIAC   (positive) PIH (pregnancy induced hypertension)      OB   (positive) PIH (pregnancy induced hypertension)       Physical Exam    Airway   Mallampati: III  TM distance: >3 FB  Neck ROM: full       Cardiovascular - normal exam  Rhythm: regular  Rate: normal  (-) murmur     Dental - normal exam           Pulmonary - normal exam  Breath sounds clear to auscultation     Abdominal    Neurological - normal exam         Other findings: Unable to palpate spine            Anesthesia Plan    ASA 3       Plan - spinal   Neuraxial block will be primary anesthetic                Postoperative Plan: Postoperative administration of opioids is intended.    Pertinent diagnostic labs and testing reviewed    Informed Consent:    Anesthetic plan and risks discussed with patient.

## 2022-06-29 NOTE — CARE PLAN
The patient is Watcher - Medium risk of patient condition declining or worsening    Shift Goals  Clinical Goals: Safe progression of labor  Patient Goals: healthy mom and healthy baby  Family Goals: support    Progress made toward(s) clinical / shift goals:    Problem: Risk for Infection and Impaired Wound Healing  Goal: Patient will remain free from infection  Outcome: Progressing     Problem: Risk for Injury  Goal: Patient and fetus will be free of preventable injury/complications  Outcome: Progressing     Problem: Pain  Goal: Patient's pain will be alleviated or reduced to the patient’s comfort goal  Outcome: Progressing

## 2022-06-29 NOTE — PROGRESS NOTES
0655: Report received from JACQUELIN Gonzalez. Pt. resting comfortably in bed; FOB at bedside.  0715: Pt. assessed, VSS; pt. reports mild HA and pain w/ UCs but denies need for pain intervention at this time.  0730: Dr. Charlee Charles at bedside to assess patient; SVE unchanged; cytotec placed; POC discussed; questions answered.  1130: SVE 1/50/-3; POC discussed, questions answered; pt. voices desire to  deliver via c/s.  1143: Dr. Linus Charles updated on pt. status; orders received to schedule pt. for c/s.  1600: Report given to JACQUELIN Mcguire.

## 2022-06-29 NOTE — PROGRESS NOTES
1900 Report received from Sophie ROPER. Assumed care of patient. Call light in reach, support at bedside. Patient complains of mild headache, denies pharmacological intervention. All questions answered at this time.    2100 Patient refuses SVE at this time.    2104 Dr. Charlee Charles notified of clonus +2 assessment. /101. Orders received to monitor patient BP every 2 hours if remaining below 160s/110s and to let patient rest and sleep.    0415 Dr. Charlee Charles updated on patient status. Patient presenting with clonus, 2+ pitting edema, see flowsheets. Complaint of mild headache, denies intervention. Patient denies blurred vision. Last blood pressure 133/77. Hypertensive protocol in place prn. Orders to let patient rest at this time.     0500 SVE, see flowsheets. Call to Dr. Charlee Charles, plan of care discussed. Dr. Charlee Charles aware cytotec not placed since 1745 6/28/22. To come in this morning for patient evaluation to discuss plan of care.     0520 Strip reviewed by Aleida valentine RN, patient irene too much to place cytotec at this time.    0600 Call to Dr. Charlee Charles that patient is irene too much to place cytotec. To discuss plan of care options with patient.    0700 Report given to Sophie ROPER.

## 2022-06-30 LAB
ERYTHROCYTE [DISTWIDTH] IN BLOOD BY AUTOMATED COUNT: 40.1 FL (ref 35.9–50)
HCT VFR BLD AUTO: 30.5 % (ref 37–47)
HGB BLD-MCNC: 10 G/DL (ref 12–16)
MCH RBC QN AUTO: 25.3 PG (ref 27–33)
MCHC RBC AUTO-ENTMCNC: 32.8 G/DL (ref 33.6–35)
MCV RBC AUTO: 77.2 FL (ref 81.4–97.8)
PATHOLOGY CONSULT NOTE: NORMAL
PLATELET # BLD AUTO: 317 K/UL (ref 164–446)
PMV BLD AUTO: 9.6 FL (ref 9–12.9)
RBC # BLD AUTO: 3.95 M/UL (ref 4.2–5.4)
WBC # BLD AUTO: 12.9 K/UL (ref 4.8–10.8)

## 2022-06-30 PROCEDURE — 770002 HCHG ROOM/CARE - OB PRIVATE (112)

## 2022-06-30 PROCEDURE — 36415 COLL VENOUS BLD VENIPUNCTURE: CPT

## 2022-06-30 PROCEDURE — A9270 NON-COVERED ITEM OR SERVICE: HCPCS | Performed by: OBSTETRICS & GYNECOLOGY

## 2022-06-30 PROCEDURE — A9270 NON-COVERED ITEM OR SERVICE: HCPCS | Performed by: ANESTHESIOLOGY

## 2022-06-30 PROCEDURE — 700102 HCHG RX REV CODE 250 W/ 637 OVERRIDE(OP): Performed by: ANESTHESIOLOGY

## 2022-06-30 PROCEDURE — 700111 HCHG RX REV CODE 636 W/ 250 OVERRIDE (IP): Performed by: ANESTHESIOLOGY

## 2022-06-30 PROCEDURE — 700102 HCHG RX REV CODE 250 W/ 637 OVERRIDE(OP): Performed by: OBSTETRICS & GYNECOLOGY

## 2022-06-30 PROCEDURE — 85027 COMPLETE CBC AUTOMATED: CPT

## 2022-06-30 RX ADMIN — KETOROLAC TROMETHAMINE 30 MG: 30 INJECTION, SOLUTION INTRAMUSCULAR; INTRAVENOUS at 00:45

## 2022-06-30 RX ADMIN — DOCUSATE SODIUM 100 MG: 100 CAPSULE, LIQUID FILLED ORAL at 19:31

## 2022-06-30 RX ADMIN — ACETAMINOPHEN 1000 MG: 500 TABLET, FILM COATED ORAL at 19:30

## 2022-06-30 RX ADMIN — IBUPROFEN 800 MG: 800 TABLET, FILM COATED ORAL at 23:40

## 2022-06-30 RX ADMIN — LABETALOL HYDROCHLORIDE 100 MG: 100 TABLET, FILM COATED ORAL at 05:52

## 2022-06-30 RX ADMIN — LABETALOL HYDROCHLORIDE 100 MG: 100 TABLET, FILM COATED ORAL at 17:24

## 2022-06-30 RX ADMIN — ACETAMINOPHEN 1000 MG: 500 TABLET, FILM COATED ORAL at 13:08

## 2022-06-30 RX ADMIN — KETOROLAC TROMETHAMINE 30 MG: 30 INJECTION, SOLUTION INTRAMUSCULAR; INTRAVENOUS at 13:08

## 2022-06-30 RX ADMIN — ACETAMINOPHEN 1000 MG: 500 TABLET, FILM COATED ORAL at 05:02

## 2022-06-30 RX ADMIN — KETOROLAC TROMETHAMINE 30 MG: 30 INJECTION, SOLUTION INTRAMUSCULAR; INTRAVENOUS at 05:52

## 2022-06-30 ASSESSMENT — PAIN DESCRIPTION - PAIN TYPE: TYPE: SURGICAL PAIN

## 2022-06-30 NOTE — PROGRESS NOTES
Post Partum Progress Note    Name:   Mercy Almeida   Date/Time:  2022 - 8:03 AM  Chief Admitting Dx:  Encounter for induction of labor [Z34.90]  Delivery Type:   for failed IOL for preeclampsia at 35 weeks   Post-Op/Post Partum Days #:  1    Subjective:  Abdominal pain: no  Ambulating:   yes  Tolerating liquids:  yes  Tolerating food:  yes common adult  Flatus:   yes  BM:    no  Bleeding:   without any bleeding  Voiding:   yes  Dizziness:   no  Feeding:   breast    Vitals:    22 0300 22 0400 22 0500 22 0600   BP:    119/79   Pulse: 82 79 92 79   Resp: 20 16 18 20   Temp:    36.7 °C (98.1 °F)   TempSrc:    Temporal   SpO2: 95% 92% 97% 95%   Weight:       Height:           Exam:  Breast: Tenderness no  Abdomen: Abdomen soft, non-tender. BS normal. No masses,  No organomegaly  Fundal Tenderness:  no  Fundus Firm: yes  Incision: dry and intact  Below umbilicus: yes  Perineum: perineum intact  Lochia: mild  Extremities: Normal extremities, peripheral pulses and reflexes normal    Meds:  Current Facility-Administered Medications   Medication Dose   • lactated ringers infusion     • acetaminophen (TYLENOL) tablet 1,000 mg  1,000 mg   • ketorolac (TORADOL) injection 30 mg  30 mg   • oxyCODONE immediate-release (ROXICODONE) tablet 5 mg  5 mg   • oxyCODONE immediate release (ROXICODONE) tablet 10 mg  10 mg   • HYDROmorphone (Dilaudid) injection 0.2 mg  0.2 mg   • HYDROmorphone (Dilaudid) injection 0.4 mg  0.4 mg   • ondansetron (ZOFRAN) syringe/vial injection 4 mg  4 mg   • diphenhydrAMINE (BENADRYL) injection 12.5 mg  12.5 mg   • diphenhydrAMINE (BENADRYL) injection 12.5 mg  12.5 mg    Or   • diphenhydrAMINE (BENADRYL) injection 25 mg  25 mg    Or   • naloxone HCl (NARCAN) 20 mg in  mL infusion  0.4 mg/hr   • lactated ringers infusion     • ibuprofen (MOTRIN) tablet 800 mg  800 mg    Followed by   • [START ON 7/3/2022] ibuprofen (MOTRIN) tablet 800 mg  800 mg   •  acetaminophen (TYLENOL) tablet 1,000 mg  1,000 mg    Followed by   • [START ON 7/3/2022] acetaminophen (TYLENOL) tablet 1,000 mg  1,000 mg   • oxyCODONE immediate-release (ROXICODONE) tablet 5 mg  5 mg   • oxyCODONE immediate release (ROXICODONE) tablet 10 mg  10 mg   • ondansetron (ZOFRAN) syringe/vial injection 4 mg  4 mg    Or   • ondansetron (ZOFRAN ODT) dispertab 4 mg  4 mg   • diphenhydrAMINE (BENADRYL) tablet/capsule 25 mg  25 mg    Or   • diphenhydrAMINE (BENADRYL) injection 25 mg  25 mg   • docusate sodium (COLACE) capsule 100 mg  100 mg   • tetanus-dipth-acell pertussis (Tdap) inj 0.5 mL  0.5 mL   • measles, mumps and rubella vaccine (MMR) injection 0.5 mL  0.5 mL   • oxytocin (PITOCIN) infusion (for postpartum)  2,000 mL/hr    Followed by   • oxytocin (PITOCIN) infusion (for postpartum)  125 mL/hr   • oxytocin (PITOCIN) injection 10 Units  10 Units   • miSOPROStol (CYTOTEC) tablet 800 mcg  800 mcg   • labetalol (NORMODYNE) tablet 100 mg  100 mg   • oxytocin (PITOCIN) infusion (for post delivery)  125 mL/hr       Labs:   Recent Labs     220 22  0549   WBC 10.4 12.9*   RBC 4.55 3.95*   HEMOGLOBIN 11.4* 10.0*   HEMATOCRIT 35.3* 30.5*   MCV 77.6* 77.2*   MCH 25.1* 25.3*   MCHC 32.3* 32.8*   RDW 40.7 40.1   PLATELETCT 319 317   MPV 10.2 9.6       Assessment:  Chief Admitting Dx:  Encounter for induction of labor [Z34.90]  Delivery Type:   for failed IOL  Tubal Ligation:  no    Plan:  Continue routine post partum care.  Breast feed  Baby in the NICU  ambulate    Araceli Cohen M.D.

## 2022-06-30 NOTE — PROGRESS NOTES
Admitted from L&D. Pt alert, awake. Reports adequate pain management. Fundus firm, lochia min-mod. Has large abdominal fibroid. MD aware per report. Castellon to dd with qs output. +numbness and tingling right now. SCD's in place. Low incision with dermabond and TSM, approximated, old drainage seen. Infant in NICU. FOB at bedside. Plan of care reviewed including emergency call light, postpartum care, vitals frequency, fall precautions, medications and labs. Verbalized understanding and agrees. Able to make needs known.

## 2022-06-30 NOTE — PROGRESS NOTES
1600- Report from Sophie ROPER     7015- Delivery of viable male. 7/7 apgars given     1900- Report to Halima ROPER.

## 2022-06-30 NOTE — ANESTHESIA TIME REPORT
Anesthesia Start and Stop Event Times     Date Time Event    6/29/2022 1620 Ready for Procedure     1702 Anesthesia Start     1808 Anesthesia Stop        Responsible Staff  06/29/22    Name Role Begin End    Pedro Taylor M.D. Anesth 1702 1808        Overtime Reason:  no overtime (within assigned shift)    Comments:

## 2022-06-30 NOTE — LACTATION NOTE
This note was copied from a baby's chart.  27yr, , 35wk6d, IOL for preeclampsia    Reviewed breastfeeding desires.  Baby in NICU and mom is hopeful that she will be able to breastfeed baby.  HG pump in room and mom states that she has been instructed on how to use but admits that she has only used the pump twice.    Education provided on importance and reason for using breast pump every 3 hours, even through the night to best optimize and initiate her milk production.      Offered to help with pump use.  Set up and to start at speed of 80 and reduce to 60 after 2 minutes.  Suction to comfort and mom startings at 30% without pain or discomfort.  Instructed to decrease if uncomfortable.  To use for 15 - 20 minutes and then follow with a few minutes of hand expression.  Taught how to properly perform hand expression.  No colostrum seen at this time.      Pump cleaning kit and instructions provided and explained.      Baby labels provided and instructed to place date and time on any collected colostrum and take to NICU with 4 hours.  Milk Storage guidelines provided.    HG pump rental information provided.  Mom has personal pump at home.  Recommended HG rental, if possible, to best increase milk supply.    How to maximize milk production information sheet provided.

## 2022-06-30 NOTE — PROGRESS NOTES
Report received from May RN @ 0295. Assumed care. Patient is awake and alert. FOB is at the bedside. Discussed plan of are to patient. Assessment done. She would like to see the infant in the NICU. Encouraged patient to use the restroom and to try to void. Perineal care done. Her gait is steady. Abdominal binder will be applied once available. Wheelchair provided for patient to see her baby.

## 2022-06-30 NOTE — PROGRESS NOTES
Plan to breastpump. Reviewed frequency, , saving milk. Instructed to review [place] hand expression as well to increase milk production and stimulation. Verbalized understanding and agrees. All questions answered.

## 2022-06-30 NOTE — ANESTHESIA PROCEDURE NOTES
Spinal Block    Date/Time: 6/29/2022 5:12 PM  Performed by: Pedro Taylor M.D.  Authorized by: Pedro Taylor M.D.     Patient Location:  OB  Start Time:  6/29/2022 5:12 PM  End Time:  6/29/2022 5:17 PM  Reason for Block: primary anesthetic    patient identified, IV checked, site marked, risks and benefits discussed, surgical consent, monitors and equipment checked, pre-op evaluation and timeout performed    Patient Position:  Sitting  Prep: ChloraPrep, patient draped and sterile technique    Monitoring:  Blood pressure, continuous pulse oximetry and heart rate  Approach:  Midline  Location:  L3-4  Injection Technique:  Single-shot  Skin infiltration:  Lidocaine  Strength:  1%  Dose:  3ml  Needle Type:  Pencan  Needle Gauge:  25 G  CSF flowing pre/post injection:  Yes  Sensory Level:  T4   Unable to find interspace with spinal needle alone.  Used Touhy 3.5in DENNIS air to find epidural space (1 pass) then 5in spinal needle thru [place]

## 2022-06-30 NOTE — CARE PLAN
The patient is Watcher - Medium risk of patient condition declining or worsening    Shift Goals  Clinical Goals: safe progression of labor/delivery of healthy baby  Patient Goals: helathy mom/healthy baby  Family Goals: support    Progress made toward(s) clinical / shift goals:    Tolerating diet.   Pain managed.  Breastpumping.   Fundus firm, lochia light to mod.     Patient is not progressing towards the following goals:  No latch. Infant in NICU.     Problem: Knowledge Deficit - Postpartum  Goal: Patient will verbalize and demonstrate understanding of self and infant care  Outcome: Not Progressing  Note: Infant in NICU.      Problem: Pain - Standard  Goal: Alleviation of pain or a reduction in pain to the patient’s comfort goal  Outcome: Progressing  Note: Reports adequate pain management. On scheduled Toradol and Tylenol. Resting.      Problem: Altered Physiologic Condition  Goal: Patient physiologically stable as evidenced by normal lochia, palpable uterine involution and vitals within normal limits  Outcome: Progressing  Note: Fundus firm, lochia light. Slight high bp but improved this am. Pt on scheduled labetolol.

## 2022-06-30 NOTE — CARE PLAN
The patient is Stable - Low risk of patient condition declining or worsening    Shift Goals  Clinical Goals: BP stable; ambulate; pain control  Patient Goals: to see the baby in the NICU  Family Goals: supportive care.    Progress made toward(s) clinical / shift goals: Patient's vital signs were still within the normal limits. She's ambulating well and was able to visit her infant for a couple of hours in the NICU. Patient has a good pain control. Family is at the bedside for support.     Patient is not progressing towards the following goals: n/a      Problem: Pain - Standard  Goal: Alleviation of pain or a reduction in pain to the patient’s comfort goal  Outcome: Progressing     Problem: Altered Physiologic Condition  Goal: Patient physiologically stable as evidenced by normal lochia, palpable uterine involution and vitals within normal limits  Outcome: Progressing     Problem: Early Mobilization - Post Surgery  Goal: Early mobilization post surgery  Outcome: Progressing     Problem: Knowledge Deficit - Postpartum  Goal: Patient will verbalize and demonstrate understanding of self and infant care  Outcome: Progressing

## 2022-06-30 NOTE — PROGRESS NOTES
Castellon cath dcd. Pt tolerated well. Ambulated to bathroom with 1-2 person assist. Instructed to call staff when OOB to prevent fall.

## 2022-06-30 NOTE — ANESTHESIA POSTPROCEDURE EVALUATION
Patient: Mercy Almeida    Procedure Summary     Date: 22 Room / Location: [facility] OR 02 / SURGERY LABOR AND DELIVERY    Anesthesia Start:  Anesthesia Stop:     Procedure:  SECTION, PRIMARY (Abdomen) Diagnosis: (failure to progress)    Surgeons: Araceli Cohen M.D. Responsible Provider: Pedro Taylor M.D.    Anesthesia Type: spinal ASA Status: 3          Final Anesthesia Type: spinal  Last vitals  BP   Blood Pressure: (!) 144/99    Temp   36.8 °C (98.2 °F)    Pulse   67   Resp   18    SpO2          Anesthesia Post Evaluation    Patient location during evaluation: PACU  Patient participation: complete - patient participated  Level of consciousness: awake and alert    Airway patency: patent  Anesthetic complications: no  Cardiovascular status: hemodynamically stable  Respiratory status: acceptable  Hydration status: euvolemic    PONV: none          No complications documented.     Nurse Pain Score: 0 (NPRS)

## 2022-06-30 NOTE — PROGRESS NOTES
1900 - Report received. POC discussed.   2020 - Pt transferred to  in stable condition via gurney with infant in arms. Report given to May JACQUELIN. Infant bands matched x2 cuddles active.

## 2022-06-30 NOTE — OP REPORT
DATE OF PROCEDURE:  2022     PRE-OP DIAGNOSIS: 27 year old  35 weeks and 6 days, IOL for Preeclampsia                                  Failed Induction of Labor                                     POST-OP DIAGNOSIS: SAME, Fibroid Uterus     PROCEDURE: PRIMARY LOW TRANSVERSE  SECTION PFANNENSTIEL                           SURGEON: PAO MICHELLE MD/JAN TAYLOR MD                                ANESTHESIOLOGIST: PERCY BUCHANAN MD/SPINAL     SPECIMEN: CORD GAS                       PLACENTA    EBL: 800 CC                                                               UO: 10 CC CLEAR     FINDINGS:delivered to live born baby BOY ([place])  vertex                     BW - pending  AS  --> NICU                    Placenta anterior delivered complete and intact ; 3VC                    Uterus with multiple fibroids, largest about 8 cm that is subserous located at the anterior fundal; bilateral tubes and ovaries grossly normal                DESCRIPTION OF PROCEDURE:                            Patient and family were discussed about the risks, indications, alternatives and complications of the procedure. No further questions. Signed consents.              Patient was taken to the operating room where spinal anesthesia induction was done without difficulty. She was then placed in a supine position with a leftward tilt, prepped and draped in a normal usual sterile fashion. After adequate level of anesthesia has been ascertained, Pfannenstiel incision was then made on the skin and carried down to the underlying layer of fascia. Fascia was nicked in the midline and incision carried down bilaterally sharply. Superior and inferior aspects of fascial incision was clamped with Kocher's, elevated, tented up and dissected off bluntly from underlying rectus muscle. Rectus muscle was then  in the midline and peritoneum entered sharply and extended cephalo-caudally. Good visualization of the lower  uterine segment afforded after Neto O was inserted into the incision. Identification of the vesico-uterine peritoneum and creation of bladder flap to put down the bladder was performed.   Low transverse incision was then made at the lower uterine segment and extended cephalocaudally digitally. Amniotomy was clear. Infant was delivered in vertex presentation, nuchal cord was reduced, good suctioning of the nose and mouth was done and rest of the body delivered atraumatically. Delayed cord clamping was done and cut and handed off to the NCU staff in attendance for further care.   Placenta was delivered complete and intact, 3 VC.  The cavity was cleansed of all clots and debris.   Hysterotomy incision was repaired using Chromic 1 in a continuous interlocking fashion with excellent hemostasis.   Pelvic gutters was cleared of clots and debris.   Peritoneal layer was closed in simple continuous manner using Vicryl 2-0.  Rectus muscle was reapproximated in the midline using chromic 1.  Fascial layer was repaired using Vicryl 0 in the simple continuous fashion.  Subcutaneous layer was reapproximated using Vicryl 3-0.  Skin was closed with insorb staples.   Prineo was applied over the incision.   All layers were hemostatic.  Procedure was terminated. Sponges, Laps and needles count correct x 3.  Patient was taken to the PACU, awake and in good condition.      Araceli Charles MD

## 2022-07-01 PROCEDURE — 700102 HCHG RX REV CODE 250 W/ 637 OVERRIDE(OP): Performed by: OBSTETRICS & GYNECOLOGY

## 2022-07-01 PROCEDURE — 770002 HCHG ROOM/CARE - OB PRIVATE (112)

## 2022-07-01 PROCEDURE — A9270 NON-COVERED ITEM OR SERVICE: HCPCS | Performed by: OBSTETRICS & GYNECOLOGY

## 2022-07-01 RX ORDER — FUROSEMIDE 40 MG/1
40 TABLET ORAL ONCE
Status: COMPLETED | OUTPATIENT
Start: 2022-07-01 | End: 2022-07-01

## 2022-07-01 RX ORDER — FERROUS GLUCONATE 324(38)MG
324 TABLET ORAL
Status: DISCONTINUED | OUTPATIENT
Start: 2022-07-01 | End: 2022-07-02 | Stop reason: HOSPADM

## 2022-07-01 RX ADMIN — ACETAMINOPHEN 1000 MG: 500 TABLET, FILM COATED ORAL at 12:54

## 2022-07-01 RX ADMIN — FERROUS GLUCONATE 324 MG: 324 TABLET ORAL at 08:02

## 2022-07-01 RX ADMIN — IBUPROFEN 800 MG: 800 TABLET, FILM COATED ORAL at 22:12

## 2022-07-01 RX ADMIN — LABETALOL HYDROCHLORIDE 100 MG: 100 TABLET, FILM COATED ORAL at 06:08

## 2022-07-01 RX ADMIN — ACETAMINOPHEN 1000 MG: 500 TABLET, FILM COATED ORAL at 00:54

## 2022-07-01 RX ADMIN — ACETAMINOPHEN 1000 MG: 500 TABLET, FILM COATED ORAL at 06:08

## 2022-07-01 RX ADMIN — IBUPROFEN 800 MG: 800 TABLET, FILM COATED ORAL at 16:00

## 2022-07-01 RX ADMIN — ACETAMINOPHEN 1000 MG: 500 TABLET, FILM COATED ORAL at 18:11

## 2022-07-01 RX ADMIN — FUROSEMIDE 40 MG: 40 TABLET ORAL at 08:58

## 2022-07-01 RX ADMIN — LABETALOL HYDROCHLORIDE 100 MG: 100 TABLET, FILM COATED ORAL at 18:11

## 2022-07-01 RX ADMIN — IBUPROFEN 800 MG: 800 TABLET, FILM COATED ORAL at 08:20

## 2022-07-01 ASSESSMENT — EDINBURGH POSTNATAL DEPRESSION SCALE (EPDS)
I HAVE BEEN ABLE TO LAUGH AND SEE THE FUNNY SIDE OF THINGS: NOT QUITE SO MUCH NOW
THE THOUGHT OF HARMING MYSELF HAS OCCURRED TO ME: NEVER
I HAVE FELT SCARED OR PANICKY FOR NO GOOD REASON: NO, NOT AT ALL
I HAVE FELT SAD OR MISERABLE: NO, NOT AT ALL
I HAVE BLAMED MYSELF UNNECESSARILY WHEN THINGS WENT WRONG: NOT VERY OFTEN
I HAVE LOOKED FORWARD WITH ENJOYMENT TO THINGS: AS MUCH AS I EVER DID
I HAVE BEEN SO UNHAPPY THAT I HAVE BEEN CRYING: NO, NEVER
THINGS HAVE BEEN GETTING ON TOP OF ME: NO, I HAVE BEEN COPING AS WELL AS EVER
I HAVE BEEN ANXIOUS OR WORRIED FOR NO GOOD REASON: NO, NOT AT ALL
I HAVE BEEN SO UNHAPPY THAT I HAVE HAD DIFFICULTY SLEEPING: NOT AT ALL

## 2022-07-01 ASSESSMENT — PAIN DESCRIPTION - PAIN TYPE
TYPE: ACUTE PAIN
TYPE: ACUTE PAIN
TYPE: SURGICAL PAIN
TYPE: ACUTE PAIN

## 2022-07-01 NOTE — DISCHARGE PLANNING
Discharge Planning Assessment Post Partum    Reason for Referral: NICU  Address: [address]  Type of Living Situation:Stable living with FOB   Mom Diagnosis: Postpartum   Baby Diagnosis: NICU 36.1  Primary Language: English     Name of Baby: [place]   Father of the Baby: James  Involved in baby’s care? Yes  Contact Information: [phone]    Prenatal Care: Yes  Mom's PCP: Ilya Barnett  PCP for new baby: provided pediatrician list   Support System: MOB stated good support   Coping/Bonding between mother & baby: MOB coping/bonding when able in NICU  Source of Feeding: Breastfeeding   Supplies for Infant: MOB stated having all needed supplies     Mom's Insurance: [place]  Baby Covered on Insurance:MOB will add baby  Mother Employed/School: [employer]  Other children in the home/names & ages: First baby     Financial Hardship/Income: None identified    Mom's Mental status: Stable. MOB tearful during assessment. Provided support during this time  Services used prior to admit: None    CPS History: None  Psychiatric History: None  Domestic Violence History: None  Drug/ETOH History: None    Resources Provided:  provided postpartum depression resources, community resources, and pediatrician list   Referrals Made: None     Clearance for Discharge: Baby is cleared to discharged with MOB and FOB when medically cleared      Ongoing Plan: will continue to provide support and resources as needed during NICU admission

## 2022-07-01 NOTE — CARE PLAN
Problem: Pain - Standard  Goal: Alleviation of pain or a reduction in pain to the patient’s comfort goal  Outcome: Progressing     Problem: Altered Physiologic Condition  Goal: Patient physiologically stable as evidenced by normal lochia, palpable uterine involution and vitals within normal limits  Outcome: Progressing     Problem: Bowel Elimination - Post Surgical  Goal: Patient will resume regular bowel sounds and function with no discomfort or distention  Outcome: Progressing     Problem: Early Mobilization - Post Surgery  Goal: Early mobilization post surgery  Outcome: Progressing   The patient is hemodynamically stable    Shift Goals: pain controlled, ambulation, rest  Clinical Goals: pain management  Patient Goals: pain controlled ambulation, rest  Family Goals: rest    Progress made toward(s) clinical / shift goals:  pt verbalized acceptable level of pain    Patient is not progressing towards the following goals:

## 2022-07-01 NOTE — PROGRESS NOTES
Stable postop.  C/o mild discomfort.  Description similar to round ligament pain.  Pleasant.  POC discussed for day.  Verbalizes understanding.

## 2022-07-01 NOTE — CARE PLAN
Problem: Altered Physiologic Condition  Goal: Patient physiologically stable as evidenced by normal lochia, palpable uterine involution and vitals within normal limits  Outcome: Progressing   The patient is Stable - Low risk of patient condition declining or worsening    Shift Goals  Clinical Goals: pain management  Patient Goals: pain controlled ambulation, rest  Family Goals: rest    Progress made toward(s) clinical / shift goals:  assessment wnl    Patient is not progressing towards the following goals:

## 2022-07-01 NOTE — PROGRESS NOTES
Post Partum Progress Note    Name:   Mercy Almeida   Date/Time:  2022 - 6:54 AM  Chief Admitting Dx:  Encounter for induction of labor [Z34.90]  Delivery Type:   for failed IOL  Post-Op/Post Partum Days #:  2    Subjective:  Abdominal pain: no  Ambulating:   yes  Tolerating liquids:  yes  Tolerating food:  yes common adult  Flatus:   yes  BM:    yes  Bleeding:   without any bleeding  Voiding:   yes  Dizziness:   no  Feeding:   breast    Vitals:    22 1400 22 2300 22 0200 22 0600   BP:  (!) 140/88 133/84    Pulse:  77 78 77   Resp:  18 18 18   Temp: 36.8 °C (98.3 °F) 36.1 °C (97 °F) 36.8 °C (98.2 °F) 36.6 °C (97.8 °F)   TempSrc:  Temporal Temporal Temporal   SpO2:  95% 100% 97%   Weight:       Height:           Exam:  Breast: Tenderness no  Abdomen: Abdomen soft, non-tender. BS normal. No masses,  No organomegaly  Fundal Tenderness:  no  Fundus Firm: yes  Incision: dry and intact  Below umbilicus: yes  Perineum: perineum intact  Lochia: mild  Extremities: pitting bipedal edema +++    Meds:  Current Facility-Administered Medications   Medication Dose   • furosemide (LASIX) tablet 40 mg  40 mg   • lactated ringers infusion     • lactated ringers infusion     • ibuprofen (MOTRIN) tablet 800 mg  800 mg    Followed by   • [START ON 7/3/2022] ibuprofen (MOTRIN) tablet 800 mg  800 mg   • acetaminophen (TYLENOL) tablet 1,000 mg  1,000 mg    Followed by   • [START ON 7/3/2022] acetaminophen (TYLENOL) tablet 1,000 mg  1,000 mg   • oxyCODONE immediate-release (ROXICODONE) tablet 5 mg  5 mg   • oxyCODONE immediate release (ROXICODONE) tablet 10 mg  10 mg   • ondansetron (ZOFRAN) syringe/vial injection 4 mg  4 mg    Or   • ondansetron (ZOFRAN ODT) dispertab 4 mg  4 mg   • diphenhydrAMINE (BENADRYL) tablet/capsule 25 mg  25 mg    Or   • diphenhydrAMINE (BENADRYL) injection 25 mg  25 mg   • docusate sodium (COLACE) capsule 100 mg  100 mg   • tetanus-dipth-acell pertussis (Tdap) inj  0.5 mL  0.5 mL   • measles, mumps and rubella vaccine (MMR) injection 0.5 mL  0.5 mL   • oxytocin (PITOCIN) infusion (for postpartum)  125 mL/hr   • oxytocin (PITOCIN) injection 10 Units  10 Units   • miSOPROStol (CYTOTEC) tablet 800 mcg  800 mcg   • labetalol (NORMODYNE) tablet 100 mg  100 mg   • oxytocin (PITOCIN) infusion (for post delivery)  125 mL/hr       Labs:   Recent Labs     22  0549   WBC 12.9*   RBC 3.95*   HEMOGLOBIN 10.0*   HEMATOCRIT 30.5*   MCV 77.2*   MCH 25.3*   MCHC 32.8*   RDW 40.1   PLATELETCT 317   MPV 9.6       Assessment:  Chief Admitting Dx:  Encounter for induction of labor [Z34.90]  Delivery Type:   for IOL, failed  Tubal Ligation:  no    Plan:  Continue routine post partum care.  Ambulate   Lasix 40 mg x 1 dose    Araceli Cohen M.D.

## 2022-07-01 NOTE — PROGRESS NOTES
1930 Pt doing well, Assessment done wound covered with transparent film with very scant old blood, Pt abdomen soft non distended, voiding without difficulty, Negative for Letha Signs, scheduled medicine given as ordered, Encourage more ambulation, Needs attended.

## 2022-07-01 NOTE — LACTATION NOTE
This note was copied from a baby's chart.  Mom's breasts are firm.  Mom denies tenderness.  Mom states that she has been using breast pump every 3 hours, even through the night, but has not seen any colostrum yet.     JACQUELIN White gave warm compresses to moms breasts.  Recommended heat and massage prior to using breast pump.  Flange fitting correct.  Increased suction to 50% without pain or discomfort.  Mom aware to reduce suction if uncomfortable.  Recommended taking Motrin to help with swelling and inflammation.  Reviewed hand expression and taught proper hand expession technique.  Mom showed proper return demonstration.  Instrycyed to always follow pumping with a few minutes of hand expression to help move and transfer breastmilk.    With this pumping session, mom able to pump 2ml.  Collected in syringe, labeled and brought to NICU.  Report to NICU RN.

## 2022-07-02 ENCOUNTER — PHARMACY VISIT (OUTPATIENT)
Dept: PHARMACY | Facility: MEDICAL CENTER | Age: 27
End: 2022-07-02
Payer: COMMERCIAL

## 2022-07-02 VITALS
HEART RATE: 80 BPM | TEMPERATURE: 97.4 F | HEIGHT: 70 IN | OXYGEN SATURATION: 98 % | BODY MASS INDEX: 36.36 KG/M2 | DIASTOLIC BLOOD PRESSURE: 95 MMHG | RESPIRATION RATE: 18 BRPM | WEIGHT: 254 LBS | SYSTOLIC BLOOD PRESSURE: 134 MMHG

## 2022-07-02 PROCEDURE — 700102 HCHG RX REV CODE 250 W/ 637 OVERRIDE(OP): Performed by: OBSTETRICS & GYNECOLOGY

## 2022-07-02 PROCEDURE — RXMED WILLOW AMBULATORY MEDICATION CHARGE: Performed by: OBSTETRICS & GYNECOLOGY

## 2022-07-02 PROCEDURE — A9270 NON-COVERED ITEM OR SERVICE: HCPCS | Performed by: OBSTETRICS & GYNECOLOGY

## 2022-07-02 RX ORDER — DOCUSATE SODIUM 100 MG/1
100 CAPSULE, LIQUID FILLED ORAL 2 TIMES DAILY
Qty: 60 CAPSULE | Refills: 3 | Status: SHIPPED | OUTPATIENT
Start: 2022-07-02 | End: 2022-10-06

## 2022-07-02 RX ORDER — IBUPROFEN 600 MG/1
600 TABLET ORAL EVERY 6 HOURS PRN
Qty: 30 TABLET | Refills: 3 | Status: SHIPPED | OUTPATIENT
Start: 2022-07-02 | End: 2022-10-06

## 2022-07-02 RX ORDER — OXYCODONE HYDROCHLORIDE AND ACETAMINOPHEN 5; 325 MG/1; MG/1
1 TABLET ORAL EVERY 6 HOURS PRN
Qty: 28 TABLET | Refills: 0 | Status: SHIPPED | OUTPATIENT
Start: 2022-07-02 | End: 2022-07-09

## 2022-07-02 RX ADMIN — LABETALOL HYDROCHLORIDE 100 MG: 100 TABLET, FILM COATED ORAL at 06:06

## 2022-07-02 RX ADMIN — FERROUS GLUCONATE 324 MG: 324 TABLET ORAL at 07:30

## 2022-07-02 RX ADMIN — IBUPROFEN 800 MG: 800 TABLET, FILM COATED ORAL at 06:06

## 2022-07-02 RX ADMIN — ACETAMINOPHEN 1000 MG: 500 TABLET, FILM COATED ORAL at 04:13

## 2022-07-02 RX ADMIN — IBUPROFEN 800 MG: 800 TABLET, FILM COATED ORAL at 13:42

## 2022-07-02 RX ADMIN — ACETAMINOPHEN 1000 MG: 500 TABLET, FILM COATED ORAL at 12:00

## 2022-07-02 ASSESSMENT — PAIN DESCRIPTION - PAIN TYPE: TYPE: SURGICAL PAIN

## 2022-07-02 NOTE — CARE PLAN
The patient is Stable - Low risk of patient condition declining or worsening    Shift Goals  Clinical Goals: Pain control  Patient Goals: pain controlled ambulation, rest  Family Goals: rest    Progress made toward(s) clinical / shift goals:  Pain well controlled    Patient is not progressing towards the following goals:

## 2022-07-02 NOTE — CARE PLAN
The patient is Stable - Low risk of patient condition declining or worsening    Shift Goals  Clinical Goals: Pain managment, normal lochia  Patient Goals: pain controlled ambulation, rest  Family Goals: rest    Progress made toward(s) clinical / shift goals:    Problem: Altered Physiologic Condition  Goal: Patient physiologically stable as evidenced by normal lochia, palpable uterine involution and vitals within normal limits  Outcome: Progressing  Patient's bleeding and lochia changes are normal.     Problem: Early Mobilization - Post Surgery  Goal: Early mobilization post surgery  Outcome: Progressing   Patient is ambulating as tolerated    Patient is not progressing towards the following goals:

## 2022-07-02 NOTE — DISCHARGE SUMMARY
Discharge Summary:      Mercy Almeida    Admit Date:   2022  Discharge Date:  2022     Admitting diagnosis:  Encounter for induction of labor [Z34.90]  Discharge Diagnosis: Status post  for failure of IOL.  Pregnancy Complications: Preeclampsia at 35 weeks   Tubal Ligation:  no        History:  History reviewed. No pertinent past medical history.  OB History    Para Term  AB Living   2 1   1 1 1   SAB IAB Ectopic Molar Multiple Live Births   1       0 1      # Outcome Date GA Lbr Mikey/2nd Weight Sex Delivery Anes PTL Lv   2  22 35w6d  2.639 kg (5 lb 13.1 oz) M CS-LTranv Spinal Y JOE      Complications: Failure to Progress in First Stage   1 SAB 21    U  None N         Patient has no known allergies.  Patient Active Problem List    Diagnosis Date Noted   • PIH (pregnancy induced hypertension) 2022   • Encounter for induction of labor 2022   • Vitamin D deficiency 01/10/2020   • Weakness 2020   • Meningioma (HCC) 2020        Hospital Course:   27 y.o. , now para 1, was admitted with the above mentioned diagnosis, underwent Primary  failure to progress,  for failure to progress. Patient postpartum course was unremarkable, with progressive advancement in diet , ambulation and toleration of oral analgesia. Patient without complaints today and desires discharge.      Vitals:    22 2200 22 0200 22 0600 22 0940   BP: (!) 141/86 (!) 137/90 (!) 143/88 (!) 134/95   Pulse: 78 89 89 80   Resp: 18 18 18 18   Temp: 37.1 °C (98.8 °F) 36.7 °C (98 °F) 36.5 °C (97.7 °F) 36.3 °C (97.4 °F)   TempSrc: Temporal Temporal Temporal Temporal   SpO2: 96% 96% 96% 98%   Weight:       Height:           Current Facility-Administered Medications   Medication Dose   • ferrous gluconate (FERGON) tablet 324 mg  324 mg   • lactated ringers infusion     • lactated ringers infusion     • ibuprofen (MOTRIN) tablet 800 mg   800 mg    Followed by   • [START ON 7/3/2022] ibuprofen (MOTRIN) tablet 800 mg  800 mg   • acetaminophen (TYLENOL) tablet 1,000 mg  1,000 mg    Followed by   • [START ON 7/3/2022] acetaminophen (TYLENOL) tablet 1,000 mg  1,000 mg   • oxyCODONE immediate-release (ROXICODONE) tablet 5 mg  5 mg   • oxyCODONE immediate release (ROXICODONE) tablet 10 mg  10 mg   • ondansetron (ZOFRAN) syringe/vial injection 4 mg  4 mg    Or   • ondansetron (ZOFRAN ODT) dispertab 4 mg  4 mg   • diphenhydrAMINE (BENADRYL) tablet/capsule 25 mg  25 mg    Or   • diphenhydrAMINE (BENADRYL) injection 25 mg  25 mg   • docusate sodium (COLACE) capsule 100 mg  100 mg   • tetanus-dipth-acell pertussis (Tdap) inj 0.5 mL  0.5 mL   • measles, mumps and rubella vaccine (MMR) injection 0.5 mL  0.5 mL   • oxytocin (PITOCIN) infusion (for postpartum)  125 mL/hr   • oxytocin (PITOCIN) injection 10 Units  10 Units   • miSOPROStol (CYTOTEC) tablet 800 mcg  800 mcg   • labetalol (NORMODYNE) tablet 100 mg  100 mg   • oxytocin (PITOCIN) infusion (for post delivery)  125 mL/hr       Exam:  Breast Exam: negative  Abdomen: Abdomen soft, non-tender. BS normal. No masses,  No organomegaly  Fundus Non Tender: yes  Incision: dry and intact  Perineum: perineum intact  Extremity: extremities, peripheral pulses and reflexes normal, pedal edema     Labs:  Recent Labs     06/30/22  0549   WBC 12.9*   RBC 3.95*   HEMOGLOBIN 10.0*   HEMATOCRIT 30.5*   MCV 77.2*   MCH 25.3*   MCHC 32.8*   RDW 40.1   PLATELETCT 317   MPV 9.6        Activity:   Discharge to home  Pelvic Rest x 6 weeks    Assessment:  normal postpartum course  Discharge Assessment: No heavy bleeding or foul vaginal discharge      Follow up: [facility] for incision check 1-2 weeks   Discharge Meds:   Current Outpatient Medications   Medication Sig Dispense Refill   • oxyCODONE-acetaminophen (PERCOCET) 5-325 MG Tab Take 1 Tablet by mouth every 6 hours as needed for Severe Pain for up to 7 days. 28 Tablet 0   •  ibuprofen (MOTRIN) 600 MG Tab Take 1 Tablet by mouth every 6 hours as needed for Moderate Pain. 30 Tablet 3   • docusate sodium (COLACE) 100 MG Cap Take 1 Capsule by mouth 2 times a day. 60 Capsule 3       Araceli Cohen M.D.

## 2022-07-02 NOTE — PROGRESS NOTES
Assumed care of patient at change of shift. Discussed POC with patient and answered all questions.

## 2022-07-02 NOTE — DISCHARGE INSTRUCTIONS

## 2022-07-02 NOTE — LACTATION NOTE
This note was copied from a baby's chart.  Lactation follow up:    Mom on her way to NICU with  and 2 full syringes of expressed colostrum.    Mom states that she still feels fullness and is massaging and using heat before pumping.  Encouraged to continue this process when pumping.  Still using 50% suction with discomfort.  Reminded to always follow pumping with hand expression.  Mom states that she is doing this.    Confirmed with mom that she is using breast pump every 3 hours, even through the night.

## 2022-07-26 ENCOUNTER — NON-PROVIDER VISIT (OUTPATIENT)
Dept: OBGYN | Facility: CLINIC | Age: 27
End: 2022-07-26
Payer: COMMERCIAL

## 2022-07-26 NOTE — PROGRESS NOTES
Summary: Mercy has done an excellent job establishing a healthy milk supply and growing baby Stoney well. She delivered at 35.6 weeks, in NICU for 12 days. Pumping initiated after birth. Now feeding every 2 hours throughout the day, up to 4 hours at night. Offering 2.5oz for most feedings. Pumping every 4 hours, sometimes up to 5-6 hours. Yielding 4-6oz between both breasts.   Today: Attempted to latch to the left breast, cross cradle, with 24mm Ameda nipple shield. Baby would latch but unable to elicit BUBBA. Baby became frustrated and frantic at the breast. Bottle fed 2.25oz of expressed breastmilk. Pump not available at this appointment.   Plan: Protect supply by pumping 8x daily, see suggested pump routine below. Feed baby every 2-3 hours throughout the day, no need to wake for nighttime feedings. Offer 2.5-3oz for most feedings.   Follow up:   Lactation appointment: 1-2 Weeks  Pediatrician appointment: 2022  Referrals: None     Subjective:     Mercy Almeida is a 27 y.o. female here for lactation care. She is here today with her mother and baby, Stoney.    Concerns: Latch on difficulties and LPI     HPI:   Pertinent  history: c/section at 35.6 weeks  Mother does not have a history of advanced maternal age, GDM, hypertension prior to pregnancy, insulin resistance, multiple gestation, PCOS and thyroid disease. Common condition(s) which may interfere with milk supply.    Breast changes in pregnancy: Yes  History of breast surgeries: No      FEEDING HISTORY:    Past breastfeeding history: First baby   Hospital course: Baby born at 35.6 weeks, in NICU for 12 days. Pumping initiated after birth.   Currently 2022: Feeding every 2 hours throughout the day, up to 4 hours at night. Offering 2.5oz for most feedings. Pumping every 4 hours, sometimes up to 5-6 hours. Yielding 4-6oz between both breasts.     Both breasts: No     Supplement: Supplementation initiated inpatient and Expressed  breast milk  Quantity: 75mls every 2 hours day, up to 4 hours at night  How given/devices:  Bottle  Bottle/nipple type: Tommee Tippie    Nipple Shield Use: None    Breast Pumping:   Frequency: 5x / 24 hours  Type of pump: Medela Max Flow    Maternal ROS:  Constitutional: No fever, chills. Feeling well  Breasts: No soreness of breasts and No soreness of nipples  Psychiatric: Managing ok  Mental Health: No mention of feeling irritable, agitated, angry, overwhelmed, apathy, exhaustion nor having sleep changes outside infant feeds/demands or appetite changes     Objective:     Maternal Physical Lactation Exam  General: No acute distress  Psychiatric: Normal mood and affect. Her behavior is normal. Judgment and thought content normal   Mental Health: Did NOT exhibit sadness, crying, feeling overwhelmed, agitation or hypervigilance.    Assessment/Plan & Lactation Counseling:     Infant exam on infant chart    Infant Weight History:   06/29/2022: 5# 13.1oz  07/15/2022: 6# 3.5oz  07/25/2022: 6# 3oz    Infant intake at Breast:  L  0mls   R Not Offered     Total: 0mls  Milk Transfer at this feeding:   Ineffective breastfeeding; not able to transfer a full feed from breast r/t  Initiation of Feeding: Infant initiates  Position of Feeding:    Right: Not Offered  Left: cross cradle  Attachment Achieved: with difficulty  Nipple shield: Size: 24mm       Introduced today, 7/26/2022  Latch achieved? Yes   Suck Pattern at the breast: Suck burst and normal rest   Suck Pattern on the bottle: Suck burst and normal rest  Behavior Following Observed Feeding: content  Nipple Pain: None      Latch: Assisted latch and Latch difficulty  Suckling/Feeding: attaches, baby roots, frustrated and loses suction  Milk Supply Available: normal, will need to increase to meet future needs       MATERNAL PERSONALIZED BREASTFEEDING PLAN  Discussed concerns and symptoms as listed above in assessment and guidance summarized below.  Shared decision making  was used between myself and the family for this encounter, home care, and follow up.  Topics reviewed included:   • 4th Trimester: The 12-week period immediately after mom has had the baby. Not everyone has heard of it, but every mother and their  baby will go through it. It is a time of great physical and emotional change as the baby adjusts to being outside the womb, and the family adjusts to new life as parents  o During the fourth trimester, one can expect fussiness and crying from the baby and very likely exhaustion for the family.  babies are learning to adjust to life outside the womb where it was warm and squishy!  o There is a lot of misinformation about babies and their needs, and parents are often encouraged to ignore baby's signals. Bad idea. Babies are “half-baked” at birth and have much to learn with the help of physical and emotional support from caregivers. Taking care of a baby's needs is an investment that pays off with a happier, healthier child and adult.  o It can take weeks or even months for your body to feel totally normal again. There is a major hormonal upheaval experienced by moms in the first few weeks after birth, because their bodies are shifting from many pregnancy hormones to a more normal hormonal make-up.  • Self -care through relational support and interaction.   o Encouraged  support, rest, getting out of the house each day, walk or drive somewhere,  a coffee/tea at a drive through  o Allow others to bring you food, help with chores and errands, meeting for a cup of coffee  • Prematurity and LPIs (late  infants 36-37 weeks)The premature or early baby born before 37 weeks of gestation is very prone to be sleepy at the breast, especially in the first several weeks. The most common reason for lack of milk transfer in newborns is simply falling asleep at the breast. It can sometimes be very difficult to determine whether sleepiness is due to insufficient  transfer from a tongue tie/oromotor issues, or whether they are really primarily sleepy. If a baby needs to be woken frequently to feed during the day and/or falls asleep bottle feeding, they are NOT ready for prime time at the breast, so please don't expect them to transfer sufficiently at the breast.  Infants who are sleepy can take 3 and, very occasionally, 4-6 weeks after their due date to be strong enough to transfer at every feeding. One way to know if this is the issue for this baby is if he has 1-2 feeds during the day when he is great at the breast, transfers sufficient volume, and does not need a bottle. This would typically occur after a good nap, or first feed in the am. I would encourage you to feed the baby less often at the breast, so mom can decrease the frequency of triple feeds. Pump and bottle feed several of the feeds. Once the baby demonstrates success with a few feeds at the breast, then gradually increase the # of feeds at the breast. Triple feeding should never last longer than a week, double feeding yes, not triple 24/7. Sustainability is critical.  • Milk supply is dependent on glandular tissue development, hormonal influences, how many times the baby removes milk and how well the breasts are emptied in a 24 hour period. This is a biological reality that we can NOT work around. If, for any reason, your baby is not latching, or you are not able to nurse, then it is important for you to remove the milk instead by pumping or hand expression.  There's no magic trick, tea, food, drink, cookie or supplement that will increase your milk supply. One  must  effectively remove milk to continue to make and maximize milk. In the early days and weeks that can be 8+ times in 24 hours. For older babies, on average 6-7 + times in 24 hours.    • Hydration: Staying hydrated is important however lack of hydration is usually not a cause of significantly low milk production.  Everyone needs a different amount  of water, depending on their activity and diet. A high salt and/or high-protein diet, high physical activity, or very warm weather/sweating will require more fluids. A person eating a diet high in veggies and fruit, with a lack of physical activity will require fewer fluids. There is no magic number for the # of ounces of water each day.The best way to know that you are well hydrated is by looking at your urine.  Urine should look clear to light pale yellow, and you should need to pee at least every 3 to 4 hours unless you have a large bladder capacity.  • Herbs and medications  A galactagogue is an herb or medication taken by a breastfeeding mother to increase her milk supply. We know that for centuries mothers around the world have sought out remedies to increase their milk supply. However, there is limited research on the safety and effectiveness of herbal galactagogues, which makes it hard for us to endorse them. It is not known if any of these herbs are truly effective, and it is difficult to predict how a specific herbal galactagogue will affect an individual, requiring “trial and error” in many situations. When effective, results are generally seen within 24-72 hours of starting an herbal galactagogue. Many of these herbs are used to decrease high blood sugar. If you are diabetic or have problems with low blood sugar, or thyroid disease you may not be a candidate for herbs. Not all women can increase their low milk supply with a galactagogue due to the many underlying causes of low milk production.  When taking a galactagogue, remember that frequent milk removal is still the most effective way to increase supply.   • Feeding:   o Feed your baby every 1.5-3 hours, more often if baby acts hungry.   o Awaken baby for feeding if going over 3 hours in the day.   o No need to wake for nighttime feedings   • Supplement:   o Supplement with expressed milk  - Offer 2.5-3oz for most feedings  • Nipple shield (NS): We  prefer the 24mm Medela.   o Before applying, roll the shield in on itself (like a sombrero, and allow breast to be pulled  in to the shield tip).   o The latch is very different from the bare breast, bring the baby to you and let them find the nipple shield and they will manage it, tuck them close once they find it, cheek against breast.   o Once you and your baby are familiar with the NS, you may be able to just put it on the breast and latch the baby without any preparation.  • Positioning Techniques   o Pillow used: Dorina 2 Nu Piter  o Suggested positions Cross cradle  o Fine tune position by making sure your fingers beneath the breast as well as your bra, are out of the way of your baby's chin.  o Positioning: See http://globalhealthmedia.org/portfolio-items/positions-for-breastfeeding/?hliyjypceVH=79160  • Pumping Guidelines:  o Both breasts   o Sustaining a healthy baseline prolactin level is vital- this means feeding/pumping at least every 3 hours with no more than a 5 hour break at night.   o Suggested Pump Routine: 12am, 5am, 7am, 9am, 12pm, 3pm, 6pm and 9pm  o Pump 8 times in 24 hours  o BRA    - Consider a hands free bra - Simple Wishes is recommended.  o Type of pump:  • Medela and MomCozy   o Always double pump  o How long will vary woman to woman, typically 8-15 minutes, or 1-2 minutes after flow slows  o Flange Fit: Freely moving nipple in the tunnel with some movement of the areola.  • Storage (Acceptable guidelines for healthy term babies)  o 10 hours at room temp including your pieces, may rinse but not mandatory  o 8 days refrigerator, don't need  to refrigerate right away if using fresh pumped milk at the next feeding  o Freezer 1 year  o Deep freezer 2 years  o American Academy or Pediatrics has said you may mix different temperature milks.   o If baby drinks breastmilk from a fresh or refrigerated bottle of breastmilk,  you may return the unused portion to the refrigerator and use once at  next feeding.     • Connect with other mothers:  o Facebook:   • [place] [facility]: https://www.facebook.com/[place].breastfeeds/  • Well-Nourished Babies (Private group for questions and support): https://www.facebook.com/groups/452666035340252/  o Breastfeeding [facility] [place] [facility]  WEIGHT CHECKS  • Tuesdays 10am - 11am. Women's Health at [facility], [address] [place] [address], 3rd floor conference room  • Check your baby's weight, do a feeding and see how your baby is growing, visit with other mothers, plan on a walk or coffee date after group.  o Please wear a mask coming and going: you may remove it in the classroom  o Due to space limitations - limit strollers please (New c/section moms please use your stroller).  o We would love to have dads stay, but moms won't breastfeed if there are men in the room, sorry.  o The room is generally only available from 10am -11am.   o Please take all diapers with you         In Conclusion:   Family present has verbalized what they can realistically do based on family dynamics, understanding a plan has to be doable to be effective and can be renegotiated at any time. Managing breastfeeding and milk supply is very dynamic,can be a complex and intimate journey, and is not one size fits all. When obstacles present themselves, it takes confidence, persistence and support. You are now the focus of our Breastfeeding Medicine team; we are here to support your decisions and vision.     Follow up requires close monitoring in this time sensitive window of opportunity to establish milk supply and facilitate the learning of  breastfeeding.    Follow-up for infant weight check and dyad breastfeeding evaluation in 1-2 week(s)  Please call [phone] if you have not scheduled your next appointment  Family is encouraged to e-mail us to update how the plan is working.    A total of 60 minutes, not including infant assessment time, with more than 50% was spent preparing to see the patient, obtaining and reviewing  separately obtained history, performing a medically appropriate examination and evaluation, counseling and educating the family, referring and communicating with other health care professionals, documenting clinical information in the electronic health record, independently interpreting weighted feeds and infant growth results, communicating these results to the family and care coordination as detailed in the above note.       Madelin Virk

## 2022-09-15 ENCOUNTER — HOSPITAL ENCOUNTER (EMERGENCY)
Facility: MEDICAL CENTER | Age: 27
End: 2022-09-15
Attending: EMERGENCY MEDICINE
Payer: COMMERCIAL

## 2022-09-15 ENCOUNTER — APPOINTMENT (OUTPATIENT)
Dept: RADIOLOGY | Facility: MEDICAL CENTER | Age: 27
End: 2022-09-15
Attending: OPHTHALMOLOGY
Payer: COMMERCIAL

## 2022-09-15 VITALS
OXYGEN SATURATION: 97 % | HEART RATE: 75 BPM | WEIGHT: 198.41 LBS | RESPIRATION RATE: 16 BRPM | TEMPERATURE: 97 F | BODY MASS INDEX: 28.47 KG/M2 | DIASTOLIC BLOOD PRESSURE: 85 MMHG | SYSTOLIC BLOOD PRESSURE: 125 MMHG

## 2022-09-15 DIAGNOSIS — H53.2 DIPLOPIA: ICD-10-CM

## 2022-09-15 LAB
ALBUMIN SERPL BCP-MCNC: 4.2 G/DL (ref 3.2–4.9)
ALBUMIN/GLOB SERPL: 1.5 G/DL
ALP SERPL-CCNC: 108 U/L (ref 30–99)
ALT SERPL-CCNC: 24 U/L (ref 2–50)
ANION GAP SERPL CALC-SCNC: 7 MMOL/L (ref 7–16)
AST SERPL-CCNC: 21 U/L (ref 12–45)
BASOPHILS # BLD AUTO: 0.3 % (ref 0–1.8)
BASOPHILS # BLD: 0.02 K/UL (ref 0–0.12)
BILIRUB SERPL-MCNC: 0.4 MG/DL (ref 0.1–1.5)
BUN SERPL-MCNC: 18 MG/DL (ref 8–22)
CALCIUM SERPL-MCNC: 9.2 MG/DL (ref 8.5–10.5)
CHLORIDE SERPL-SCNC: 110 MMOL/L (ref 96–112)
CO2 SERPL-SCNC: 24 MMOL/L (ref 20–33)
CREAT SERPL-MCNC: 0.81 MG/DL (ref 0.5–1.4)
EOSINOPHIL # BLD AUTO: 0.18 K/UL (ref 0–0.51)
EOSINOPHIL NFR BLD: 3 % (ref 0–6.9)
ERYTHROCYTE [DISTWIDTH] IN BLOOD BY AUTOMATED COUNT: 44 FL (ref 35.9–50)
GFR SERPLBLD CREATININE-BSD FMLA CKD-EPI: 101 ML/MIN/1.73 M 2
GLOBULIN SER CALC-MCNC: 2.8 G/DL (ref 1.9–3.5)
GLUCOSE SERPL-MCNC: 87 MG/DL (ref 65–99)
HCG SERPL QL: NEGATIVE
HCT VFR BLD AUTO: 41.9 % (ref 37–47)
HGB BLD-MCNC: 13.6 G/DL (ref 12–16)
IMM GRANULOCYTES # BLD AUTO: 0.01 K/UL (ref 0–0.11)
IMM GRANULOCYTES NFR BLD AUTO: 0.2 % (ref 0–0.9)
LYMPHOCYTES # BLD AUTO: 2.25 K/UL (ref 1–4.8)
LYMPHOCYTES NFR BLD: 37.2 % (ref 22–41)
MCH RBC QN AUTO: 25.1 PG (ref 27–33)
MCHC RBC AUTO-ENTMCNC: 32.5 G/DL (ref 33.6–35)
MCV RBC AUTO: 77.3 FL (ref 81.4–97.8)
MONOCYTES # BLD AUTO: 0.44 K/UL (ref 0–0.85)
MONOCYTES NFR BLD AUTO: 7.3 % (ref 0–13.4)
NEUTROPHILS # BLD AUTO: 3.15 K/UL (ref 2–7.15)
NEUTROPHILS NFR BLD: 52 % (ref 44–72)
NRBC # BLD AUTO: 0 K/UL
NRBC BLD-RTO: 0 /100 WBC
PLATELET # BLD AUTO: 345 K/UL (ref 164–446)
PMV BLD AUTO: 9.2 FL (ref 9–12.9)
POTASSIUM SERPL-SCNC: 4 MMOL/L (ref 3.6–5.5)
PROT SERPL-MCNC: 7 G/DL (ref 6–8.2)
RBC # BLD AUTO: 5.42 M/UL (ref 4.2–5.4)
SODIUM SERPL-SCNC: 141 MMOL/L (ref 135–145)
T4 FREE SERPL-MCNC: 1.63 NG/DL (ref 0.93–1.7)
TSH SERPL DL<=0.005 MIU/L-ACNC: <0.005 UIU/ML (ref 0.38–5.33)
WBC # BLD AUTO: 6.1 K/UL (ref 4.8–10.8)

## 2022-09-15 PROCEDURE — 99284 EMERGENCY DEPT VISIT MOD MDM: CPT

## 2022-09-15 PROCEDURE — 80053 COMPREHEN METABOLIC PANEL: CPT

## 2022-09-15 PROCEDURE — 70553 MRI BRAIN STEM W/O & W/DYE: CPT

## 2022-09-15 PROCEDURE — 84443 ASSAY THYROID STIM HORMONE: CPT

## 2022-09-15 PROCEDURE — 36415 COLL VENOUS BLD VENIPUNCTURE: CPT

## 2022-09-15 PROCEDURE — 99283 EMERGENCY DEPT VISIT LOW MDM: CPT

## 2022-09-15 PROCEDURE — 700117 HCHG RX CONTRAST REV CODE 255: Performed by: OPHTHALMOLOGY

## 2022-09-15 PROCEDURE — 84439 ASSAY OF FREE THYROXINE: CPT

## 2022-09-15 PROCEDURE — 84703 CHORIONIC GONADOTROPIN ASSAY: CPT

## 2022-09-15 PROCEDURE — A9576 INJ PROHANCE MULTIPACK: HCPCS | Performed by: OPHTHALMOLOGY

## 2022-09-15 PROCEDURE — 85025 COMPLETE CBC W/AUTO DIFF WBC: CPT

## 2022-09-15 RX ADMIN — GADOTERIDOL 20 ML: 279.3 INJECTION, SOLUTION INTRAVENOUS at 16:12

## 2022-09-15 ASSESSMENT — FIBROSIS 4 INDEX: FIB4 SCORE: 0.45

## 2022-09-15 NOTE — Clinical Note
Mercy Almeida was seen and treated in our emergency department on 9/15/2022.  She may return to work on 09/17/2022.       If you have any questions or concerns, please don't hesitate to call.      Shar Styles D.O.

## 2022-09-15 NOTE — ED NOTES
Transfer sheet:    Sending MD requesting MRI brain with contrast, thyroid function with thyroid antibodies and myasthenia panel.

## 2022-09-15 NOTE — ED PROVIDER NOTES
"ED Provider Note    CHIEF COMPLAINT  Chief Complaint   Patient presents with    Diplopia       HPI  Mercy Almeida is a 27 y.o. female here for evaluation of diplopia.  Patient was seen evaluated by her optometrist earlier today, who was concerned about the diplopia in the bilateral lid lagging, so she sent her over here to obtain an MRI.  Patient states that she has no weakness, no vision issues other than the diplopia, and denies any fall or trauma.  She has no paresthesias.  Patient states that this happened to her in the past, and that usually \"goes away after a month or 2, and then sometimes it will return.  She takes no anticoagulants, has no fever or chills.  Nothing seems to leave exacerbate any of her symptoms.    ROS;  Please see HPI  O/W negative     PAST MEDICAL HISTORY   has a past medical history of Preeclampsia.    SOCIAL HISTORY  Social History     Tobacco Use    Smoking status: Never    Smokeless tobacco: Never   Vaping Use    Vaping Use: Former   Substance and Sexual Activity    Alcohol use: No    Drug use: No    Sexual activity: Yes     Partners: Male       SURGICAL HISTORY   has a past surgical history that includes primary c section (N/A, 6/29/2022).    CURRENT MEDICATIONS  Home Medications       Reviewed by Elle Rodrigez R.N. (Registered Nurse) on 09/15/22 at 1306  Med List Status: Partial     Medication Last Dose Status   docusate sodium (COLACE) 100 MG Cap  Active   ibuprofen (MOTRIN) 600 MG Tab  Active   labetalol (NORMODYNE) 100 MG Tab  Active   Prenatal Vit-Fe Fumarate-FA (PRENATAL 1+1 PO)  Active                    ALLERGIES  No Known Allergies    REVIEW OF SYSTEMS  See HPI for further details. Review of systems as above, otherwise all other systems are negative.     PHYSICAL EXAM  VITAL SIGNS: BP (!) 142/86   Pulse 81   Temp 36.3 °C (97.4 °F) (Temporal)   Resp 18   Wt 90 kg (198 lb 6.6 oz)   SpO2 95%   BMI 28.47 kg/m²     Constitutional: Well developed, well " nourished. No acute distress.  HEENT: Normocephalic, atraumatic. MMM, mild ptosis bilaterally.  EOMI.  PERRL  Neck: Supple, Full range of motion   Chest/Pulmonary:  No respiratory distress.  Equal expansion   Musculoskeletal: No deformity, no edema, neurovascular intact.   Neuro: Clear speech, appropriate, cooperative, cranial nerves II-XII grossly intact.  Psych: Normal mood and affect      PROCEDURES     MEDICAL RECORD  I have reviewed patient's medical record and pertinent results are listed.    COURSE & MEDICAL DECISION MAKING  I have reviewed any medical record information, laboratory studies and radiographic results as noted above.    5:31 PM  I spoke to kelsea Wu, who states the pt can follow up with him this week.  We discussed the MRI results.  Nothing further at this time.  The pt is comfortable with the plan.  The pt is also aware of her meningioma.  This is not new, and she has been seen for this in the past.  She follows up with neurology at [place], Dr. Donnelly.    I you have had any blood pressure issues while here in the emergency department, please see your doctor for a further evaluation or work up.    Differential diagnoses include but not limited to: diplopia     This patient presents with diplopia  .  At this time, I have counseled the patient/family regarding their medications, pain control, and follow up.  They will continue their medications, if any, as prescribed.  They will return immediately for any worsening symptoms and/or any other medical concerns.  They will see their doctor, or contact the doctor provided, in 1-2 days for follow up.       FINAL IMPRESSION  Diplopia       Electronically signed by: Shar Styles D.O., 9/15/2022 4:00 PM      4:00 PM  The pt has been placed in ed obs at this time.  9/15/22

## 2022-09-15 NOTE — ED TRIAGE NOTES
"Pt ambulatory to triage c/o \"droopy eyelids and double vision\" pt states this has been going on approx one week. Pt states this has occurred to her 2 times in past and has followed up neurologist 2 days ago who ruled out anything neurological and went to her optometrist today who told her to come to ER for imaging   "

## 2022-09-16 ENCOUNTER — TELEPHONE (OUTPATIENT)
Dept: OPHTHALMOLOGY | Facility: MEDICAL CENTER | Age: 27
End: 2022-09-16

## 2022-09-16 NOTE — ED NOTES
Pt given discharge instructions/home care instructions explained, pt verbalized understanding of instructions given/pt understands the importance of follow up, pt ambulatory to ER [facility].

## 2022-09-20 ENCOUNTER — TELEPHONE (OUTPATIENT)
Dept: OPHTHALMOLOGY | Facility: MEDICAL CENTER | Age: 27
End: 2022-09-20

## 2022-09-20 ENCOUNTER — OFFICE VISIT (OUTPATIENT)
Dept: OPHTHALMOLOGY | Facility: MEDICAL CENTER | Age: 27
End: 2022-09-20
Payer: COMMERCIAL

## 2022-09-20 DIAGNOSIS — H02.403 PTOSIS OF BOTH EYELIDS: ICD-10-CM

## 2022-09-20 DIAGNOSIS — H49.9 OPHTHALMOPLEGIA: ICD-10-CM

## 2022-09-20 DIAGNOSIS — H46.9 OPTIC NEURITIS: ICD-10-CM

## 2022-09-20 DIAGNOSIS — G37.9 CNS DEMYELINATION (HCC): ICD-10-CM

## 2022-09-20 DIAGNOSIS — E06.9 THYROIDITIS: ICD-10-CM

## 2022-09-20 PROCEDURE — 99205 OFFICE O/P NEW HI 60 MIN: CPT | Mod: 25 | Performed by: OPHTHALMOLOGY

## 2022-09-20 PROCEDURE — 92250 FUNDUS PHOTOGRAPHY W/I&R: CPT | Performed by: OPHTHALMOLOGY

## 2022-09-20 RX ORDER — PREDNISONE 10 MG/1
40 TABLET ORAL DAILY
Qty: 40 TABLET | Refills: 0 | Status: SHIPPED | OUTPATIENT
Start: 2022-09-20 | End: 2022-11-04

## 2022-09-20 ASSESSMENT — CONF VISUAL FIELD
OD_SUPERIOR_NASAL_RESTRICTION: 0
OD_INFERIOR_NASAL_RESTRICTION: 0
OD_NORMAL: 1
OS_NORMAL: 1
OS_SUPERIOR_TEMPORAL_RESTRICTION: 0
OD_SUPERIOR_TEMPORAL_RESTRICTION: 0
OD_INFERIOR_TEMPORAL_RESTRICTION: 0
OS_INFERIOR_TEMPORAL_RESTRICTION: 0
OS_SUPERIOR_NASAL_RESTRICTION: 0
OS_INFERIOR_NASAL_RESTRICTION: 0

## 2022-09-20 ASSESSMENT — SLIT LAMP EXAM - LIDS
COMMENTS: 2+ PTOSIS
COMMENTS: 2+ PTOSIS

## 2022-09-20 ASSESSMENT — VISUAL ACUITY
OS_SC: 20/25
OD_SC: J2
OD_SC+: -1
OS_SC: J2
OD_SC: 20/25
METHOD: SNELLEN - LINEAR

## 2022-09-20 ASSESSMENT — REFRACTION_MANIFEST
OD_SPHERE: -0.50
METHOD_AUTOREFRACTION: 1
OD_AXIS: 053
OS_AXIS: 175
OD_CYLINDER: +0.50
OS_SPHERE: -0.25
OS_CYLINDER: +0.25

## 2022-09-20 ASSESSMENT — ENCOUNTER SYMPTOMS
DOUBLE VISION: 1
HEADACHES: 1

## 2022-09-20 ASSESSMENT — CUP TO DISC RATIO
OD_RATIO: 0.2
OS_RATIO: 0.2

## 2022-09-20 ASSESSMENT — TONOMETRY
OS_IOP_MMHG: 16
OD_IOP_MMHG: 14
IOP_METHOD: I-CARE

## 2022-09-20 ASSESSMENT — EXTERNAL EXAM - RIGHT EYE: OD_EXAM: NORMAL

## 2022-09-20 ASSESSMENT — EXTERNAL EXAM - LEFT EYE: OS_EXAM: NORMAL

## 2022-09-20 NOTE — ASSESSMENT & PLAN NOTE
9/20/2022-very complex patient with a history of admission to [facility] in 2020 for a undiagnosed generalized peripheral weakness with subsequent bilateral ptosis and double vision.  She had an extensive work-up at that time that revealed a meningioma which was probably noncontributory.  She had acetylcholine receptor antibodies that were negative however antimicrosomal antibody was elevated.  She did undergo a lumbar puncture test that was unremarkable although she did have positive oligoclonal bands.  She states that she was discharged home without treatment with improvement overall.  She has been doing well until 1 week ago when she had return of her ocular symptoms having bilateral ptosis and double vision.  She was seen by neurology at [place] who was uncertain of the diagnosis and referred her to ophthalmology.  She was then seen by optometry in [facility].  She then presented to the emergency room where the meningioma was unchanged.  On examination today she has bilateral ptosis with weakness of orbicularis strength.  She also has some slight upbeat nystagmus and elevation deficit of both eyes worse on the right than the left which is leading to a left hypertropia.  She has saccadic smooth pursuit movements and some fatigue.  Therefore it appears that she has had recurrence of her symptoms from back in 2020.  High in the differential diagnosis would be myasthenia gravis or some post partum precipitation of a [place] variant of Guillain-Barré syndrome.  I therefore repeated her acetylcholine receptor antibodies, thyroid antibodies, anti-LISA 1 antibody, and recommended a trial of oral prednisone 60 mg to taper over the next 2 weeks.  She will discuss with pediatrics whether this is OK given that she is best breast-feeding.  Consideration could be for a trial of IVIG.

## 2022-09-20 NOTE — PROGRESS NOTES
Peds/Neuro Ophthalmology:   Ke Leigh M.D.    Date & Time note created:    2022   4:48 PM     Referring MD / APRN:  LISANDRO Chavarria, No att. providers found    Patient ID:  Name:             Mercy Nassar     YOB: 1995  Age:                 27 y.o.  female   MRN:               [id number]    Chief Complaint/Reason for Visit:     Ptosis (New patient for new onset double vision in both eyes x 2 weeks)      History of Present Illness:    Mercy Almeida is a 27 y.o. female   New patient for new onset ptosis and double vision in both eyes x 2 weeks. Pt states vision started to get double about to weeks ago. If she close one eye or tilts her head downwards her double vision goes away. Pt gets headaches but they are tolerable. Pt had a similar episode 2 years ago and it went away 5 months after it started. Pt did get pregnant last year and got preeclampsia. Pt has been told its not neuro related possibly hormone unbalance. Pt denies eye pain or discomfort.       Review of Systems:  Review of Systems   Eyes:  Positive for double vision.   Neurological:  Positive for headaches.   All other systems reviewed and are negative.    Past Medical History:   Past Medical History:   Diagnosis Date    Preeclampsia        Past Surgical History:  Past Surgical History:   Procedure Laterality Date    PRIMARY C SECTION N/A 2022    Procedure:  SECTION, PRIMARY;  Surgeon: Araceli Cohen M.D.;  Location: [facility];  Service: Labor and Delivery       Current Outpatient Medications:  Current Outpatient Medications   Medication Sig Dispense Refill    predniSONE (DELTASONE) 10 MG Tab Take 4 Tablets by mouth every day. Taper as directed. 10 mg ever four days 40 Tablet 0    ibuprofen (MOTRIN) 600 MG Tab Take 1 Tablet by mouth every 6 hours as needed for Moderate Pain. Take with food and water. Max 4 tablets in 24 hours. (Patient not  taking: Reported on 9/20/2022) 30 Tablet 3    docusate sodium (COLACE) 100 MG Cap Take 1 Capsule by mouth 2 times a day. (Patient not taking: Reported on 9/20/2022) 60 Capsule 3    labetalol (NORMODYNE) 100 MG Tab Take 100 mg by mouth 2 times a day. Indications: Pregnancy-Induced Hypertension (Patient not taking: Reported on 9/20/2022)      Prenatal Vit-Fe Fumarate-FA (PRENATAL 1+1 PO) Take  by mouth every day. (Patient not taking: Reported on 9/20/2022)       No current facility-administered medications for this visit.       Allergies:  No Known Allergies    Family History:  Family History   Problem Relation Age of Onset    Cancer Maternal Aunt        Social History:  Social History     Socioeconomic History    Marital status:      Spouse name: Not on file    Number of children: Not on file    Years of education: Not on file    Highest education level: Not on file   Occupational History    Not on file   Tobacco Use    Smoking status: Never    Smokeless tobacco: Never   Vaping Use    Vaping Use: Former   Substance and Sexual Activity    Alcohol use: No    Drug use: No    Sexual activity: Yes     Partners: Male   Other Topics Concern    Not on file   Social History Narrative    Working- case manger     Social Determinants of Health     Financial Resource Strain: Not on file   Food Insecurity: Not on file   Transportation Needs: Not on file   Physical Activity: Not on file   Stress: Not on file   Social Connections: Not on file   Intimate Partner Violence: Not on file   Housing Stability: Not on file          Physical Exam:  Physical Exam    Oriented x 3  Weight/BMI: There is no height or weight on file to calculate BMI.  There were no vitals taken for this visit.    Base Eye Exam       Visual Acuity (Snellen - Linear)         Right Left    Dist sc 20/25 -1 20/25    Dist ph sc NI NI    Near sc J2 J2              Tonometry (i-care, 2:18 PM)         Right Left    Pressure 14 16              Pupils         Pupils     Right PERRL    Left PERRL              Visual Fields         Right Left     Full Full                  Additional Tests       Color         Right Left    [place] 12/12 12/12              Stereo       Fly: +    Animals: 3/3    Circles: 4/9                  Strabismus Exam       Correction: sc      Distance Near Near +3DS Near Bifocals                      -1 -1 -1   -1 -1 -1                      RH(T)  0  0  RH(T)  0  0  RH(T)                      0 0 0   0 0 0                       Slit Lamp and Fundus Exam       External Exam         Right Left    External Normal Normal              Slit Lamp Exam         Right Left    Lids/Lashes 2+ Ptosis 2+ Ptosis    Conjunctiva/Sclera White and quiet White and quiet    Cornea Clear Clear    Anterior Chamber Deep and quiet Deep and quiet    Iris Round and reactive Round and reactive    Lens Clear Clear    Vitreous Normal Normal              Fundus Exam         Right Left    Disc Normal Normal    C/D Ratio 0.2 0.2    Macula Normal Normal    Vessels Normal Normal    Periphery Normal Normal                  Refraction       Manifest Refraction (Auto)         Sphere Cylinder Axis    Right -0.50 +0.50 053    Left -0.25 +0.25 175                    Pertinent Lab/Test/Imaging Review:  Review of Admission notes and labs MRI images back in 2020 and most recent MRI scans, notes from [facility]. Discussion with endocrine    Assessment and Plan:     Thyroiditis  9/20/2022 for an recent laboratory test demonstrate a very low TSH and normal T4.  Given the high microsomal antibodies at one 441.0 in the past and a recent delivery, most likely this is postpartum thyroiditis according to endocrinology.  They recommended repeating thyroid antibody testing and they will evaluate.    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  She had  an extensive work-up at that time that revealed a meningioma which was probably noncontributory.  She had acetylcholine receptor antibodies that were negative however antimicrosomal antibody was elevated.  She did undergo a lumbar puncture test that was unremarkable although she did have positive oligoclonal bands.  She states that she was discharged home without treatment with improvement overall.  She has been doing well until 1 week ago when she had return of her ocular symptoms having bilateral ptosis and double vision.  She was seen by neurology at [place] who was uncertain of the diagnosis and referred her to ophthalmology.  She was then seen by optometry in [facility].  She then presented to the emergency room where the meningioma was unchanged.  On examination today she has bilateral ptosis with weakness of orbicularis strength.  She also has some slight upbeat nystagmus and elevation deficit of both eyes worse on the right than the left which is leading to a left hypertropia.  She has saccadic smooth pursuit movements and some fatigue.  Therefore it appears that she has had recurrence of her symptoms from back in 2020.  High in the differential diagnosis would be myasthenia gravis or some post partum precipitation of a Bet[place] variant of Guillain-Barré syndrome.  I therefore repeated her acetylcholine receptor antibodies, thyroid antibodies, anti-LISA 1 antibody, and recommended a trial of oral prednisone 60 mg to taper over the next 2 weeks.  She will discuss with pediatrics whether this is OK given that she is best breast-feeding.  Consideration could be for a trial of IVIG.    CNS demyelination (HCC)  9/20/2022-on her admission back in 2020 she did have elevated oligoclonal bands however no increased protein in the CSF.  OCT nerve fiber layer thickness was normal at 96 OD and 101 OS without evidence of optic nerve involvement        Ke Leigh M.D.

## 2022-09-20 NOTE — ASSESSMENT & PLAN NOTE
9/20/2022 for an recent laboratory test demonstrate a very low TSH and normal T4.  Given the high microsomal antibodies at one 441.0 in the past and a recent delivery, most likely this is postpartum thyroiditis according to endocrinology.  They recommended repeating thyroid antibody testing and they will evaluate.

## 2022-09-20 NOTE — ASSESSMENT & PLAN NOTE
9/20/2022-on her admission back in 2020 she did have elevated oligoclonal bands however no increased protein in the CSF.  OCT nerve fiber layer thickness was normal at 96 OD and 101 OS without evidence of optic nerve involvement

## 2022-10-03 ENCOUNTER — HOSPITAL ENCOUNTER (OUTPATIENT)
Dept: LAB | Facility: MEDICAL CENTER | Age: 27
End: 2022-10-03
Attending: NURSE PRACTITIONER
Payer: COMMERCIAL

## 2022-10-03 ENCOUNTER — HOSPITAL ENCOUNTER (OUTPATIENT)
Dept: LAB | Facility: MEDICAL CENTER | Age: 27
End: 2022-10-03
Attending: OPHTHALMOLOGY
Payer: COMMERCIAL

## 2022-10-03 DIAGNOSIS — H46.9 OPTIC NEURITIS: ICD-10-CM

## 2022-10-03 DIAGNOSIS — H49.9 OPHTHALMOPLEGIA: ICD-10-CM

## 2022-10-03 DIAGNOSIS — H02.403 PTOSIS OF BOTH EYELIDS: ICD-10-CM

## 2022-10-03 LAB
ALBUMIN SERPL BCP-MCNC: 4.3 G/DL (ref 3.2–4.9)
ALBUMIN/GLOB SERPL: 1.6 G/DL
ALP SERPL-CCNC: 106 U/L (ref 30–99)
ALT SERPL-CCNC: 12 U/L (ref 2–50)
ANION GAP SERPL CALC-SCNC: 11 MMOL/L (ref 7–16)
AST SERPL-CCNC: 12 U/L (ref 12–45)
BASOPHILS # BLD AUTO: 0.4 % (ref 0–1.8)
BASOPHILS # BLD: 0.02 K/UL (ref 0–0.12)
BILIRUB SERPL-MCNC: 0.4 MG/DL (ref 0.1–1.5)
BUN SERPL-MCNC: 22 MG/DL (ref 8–22)
CALCIUM SERPL-MCNC: 9.5 MG/DL (ref 8.5–10.5)
CHLORIDE SERPL-SCNC: 105 MMOL/L (ref 96–112)
CO2 SERPL-SCNC: 25 MMOL/L (ref 20–33)
CREAT SERPL-MCNC: 0.92 MG/DL (ref 0.5–1.4)
EOSINOPHIL # BLD AUTO: 0.19 K/UL (ref 0–0.51)
EOSINOPHIL NFR BLD: 3.7 % (ref 0–6.9)
ERYTHROCYTE [DISTWIDTH] IN BLOOD BY AUTOMATED COUNT: 43.9 FL (ref 35.9–50)
FOLATE SERPL-MCNC: 14.7 NG/ML
GFR SERPLBLD CREATININE-BSD FMLA CKD-EPI: 87 ML/MIN/1.73 M 2
GLOBULIN SER CALC-MCNC: 2.7 G/DL (ref 1.9–3.5)
GLUCOSE SERPL-MCNC: 77 MG/DL (ref 65–99)
HCT VFR BLD AUTO: 47.1 % (ref 37–47)
HGB BLD-MCNC: 14.8 G/DL (ref 12–16)
IMM GRANULOCYTES # BLD AUTO: 0.01 K/UL (ref 0–0.11)
IMM GRANULOCYTES NFR BLD AUTO: 0.2 % (ref 0–0.9)
LYMPHOCYTES # BLD AUTO: 2.56 K/UL (ref 1–4.8)
LYMPHOCYTES NFR BLD: 49.2 % (ref 22–41)
MCH RBC QN AUTO: 24.8 PG (ref 27–33)
MCHC RBC AUTO-ENTMCNC: 31.4 G/DL (ref 33.6–35)
MCV RBC AUTO: 79 FL (ref 81.4–97.8)
MONOCYTES # BLD AUTO: 0.26 K/UL (ref 0–0.85)
MONOCYTES NFR BLD AUTO: 5 % (ref 0–13.4)
NEUTROPHILS # BLD AUTO: 2.16 K/UL (ref 2–7.15)
NEUTROPHILS NFR BLD: 41.5 % (ref 44–72)
NRBC # BLD AUTO: 0 K/UL
NRBC BLD-RTO: 0 /100 WBC
PLATELET # BLD AUTO: 403 K/UL (ref 164–446)
PMV BLD AUTO: 9.2 FL (ref 9–12.9)
POTASSIUM SERPL-SCNC: 3.9 MMOL/L (ref 3.6–5.5)
PROT SERPL-MCNC: 7 G/DL (ref 6–8.2)
RBC # BLD AUTO: 5.96 M/UL (ref 4.2–5.4)
SODIUM SERPL-SCNC: 141 MMOL/L (ref 135–145)
T4 FREE SERPL-MCNC: 0.81 NG/DL (ref 0.93–1.7)
T4 FREE SERPL-MCNC: 0.81 NG/DL (ref 0.93–1.7)
TSH SERPL DL<=0.005 MIU/L-ACNC: 6.87 UIU/ML (ref 0.38–5.33)
VIT B12 SERPL-MCNC: 722 PG/ML (ref 211–911)
WBC # BLD AUTO: 5.2 K/UL (ref 4.8–10.8)

## 2022-10-03 PROCEDURE — 84439 ASSAY OF FREE THYROXINE: CPT | Mod: 91

## 2022-10-03 PROCEDURE — 83516 IMMUNOASSAY NONANTIBODY: CPT | Mod: 91

## 2022-10-03 PROCEDURE — 36415 COLL VENOUS BLD VENIPUNCTURE: CPT

## 2022-10-03 PROCEDURE — 85025 COMPLETE CBC W/AUTO DIFF WBC: CPT

## 2022-10-03 PROCEDURE — 84445 ASSAY OF TSI GLOBULIN: CPT

## 2022-10-03 PROCEDURE — 80053 COMPREHEN METABOLIC PANEL: CPT

## 2022-10-03 PROCEDURE — 83519 RIA NONANTIBODY: CPT

## 2022-10-03 PROCEDURE — 84443 ASSAY THYROID STIM HORMONE: CPT

## 2022-10-03 PROCEDURE — 86015 ACTIN ANTIBODY EACH: CPT

## 2022-10-03 PROCEDURE — 82746 ASSAY OF FOLIC ACID SERUM: CPT

## 2022-10-03 PROCEDURE — 82607 VITAMIN B-12: CPT

## 2022-10-03 PROCEDURE — 84439 ASSAY OF FREE THYROXINE: CPT

## 2022-10-03 PROCEDURE — 86255 FLUORESCENT ANTIBODY SCREEN: CPT

## 2022-10-04 ENCOUNTER — OFFICE VISIT (OUTPATIENT)
Dept: OPHTHALMOLOGY | Facility: MEDICAL CENTER | Age: 27
End: 2022-10-04
Payer: COMMERCIAL

## 2022-10-04 DIAGNOSIS — H49.9 OPHTHALMOPLEGIA: ICD-10-CM

## 2022-10-04 DIAGNOSIS — E06.9 THYROIDITIS: ICD-10-CM

## 2022-10-04 PROCEDURE — 99213 OFFICE O/P EST LOW 20 MIN: CPT | Performed by: OPHTHALMOLOGY

## 2022-10-04 PROCEDURE — 92060 SENSORIMOTOR EXAMINATION: CPT | Performed by: OPHTHALMOLOGY

## 2022-10-04 ASSESSMENT — TONOMETRY
OS_IOP_MMHG: 20
OD_IOP_MMHG: 19
IOP_METHOD: I-CARE

## 2022-10-04 ASSESSMENT — CONF VISUAL FIELD
OD_INFERIOR_TEMPORAL_RESTRICTION: 0
OD_SUPERIOR_NASAL_RESTRICTION: 0
OS_SUPERIOR_NASAL_RESTRICTION: 0
OS_INFERIOR_NASAL_RESTRICTION: 0
OS_NORMAL: 1
OD_INFERIOR_NASAL_RESTRICTION: 0
OD_NORMAL: 1
OS_INFERIOR_TEMPORAL_RESTRICTION: 0
OS_SUPERIOR_TEMPORAL_RESTRICTION: 0
OD_SUPERIOR_TEMPORAL_RESTRICTION: 0

## 2022-10-04 ASSESSMENT — VISUAL ACUITY
METHOD: SNELLEN - LINEAR
OD_SC: 20/20
OS_SC+: -1
OS_SC: 20/20
OD_SC+: -1

## 2022-10-04 ASSESSMENT — REFRACTION_MANIFEST
OS_SPHERE: -0.75
OS_AXIS: 093
OD_SPHERE: -0.50
OD_AXIS: 082
OD_CYLINDER: +0.50
METHOD_AUTOREFRACTION: 1
OS_CYLINDER: +0.75

## 2022-10-04 ASSESSMENT — SLIT LAMP EXAM - LIDS
COMMENTS: 1+ PTOSIS
COMMENTS: 1+ PTOSIS

## 2022-10-04 ASSESSMENT — CUP TO DISC RATIO
OS_RATIO: 0.2
OD_RATIO: 0.2

## 2022-10-04 ASSESSMENT — ENCOUNTER SYMPTOMS
DOUBLE VISION: 1
BLURRED VISION: 1
PHOTOPHOBIA: 1

## 2022-10-04 ASSESSMENT — EXTERNAL EXAM - RIGHT EYE: OD_EXAM: NORMAL

## 2022-10-04 ASSESSMENT — EXTERNAL EXAM - LEFT EYE: OS_EXAM: NORMAL

## 2022-10-04 NOTE — PROGRESS NOTES
Peds/Neuro Ophthalmology:   Ke Leigh M.D.    Date & Time note created:    10/4/2022   1:20 PM     Referring MD / APRN:  LISANDRO Chavarria, No att. providers found    Patient ID:  Name:             Mercy Nassar   YOB: 1995  Age:                 27 y.o.  female   MRN:               [id number]    Chief Complaint/Reason for Visit:     Optic Neuropathy (2 week follow up for both eyes.)      History of Present Illness:    Mercy Almeida is a 27 y.o. female   2 week follow up for Optic neuritis in both eyes. Pt states vision and eyes seem better since last visit. Pt has noticed eyelids are less droopy. Pt has less double vision. Pt did only take pred medication for a few days stopped it because she feels it was affecting her milk supply. Pt still is light sensitive to the sun only.       Review of Systems:  Review of Systems   Eyes:  Positive for blurred vision, double vision and photophobia.        OU eyelids are droopy.   All other systems reviewed and are negative.    Past Medical History:   Past Medical History:   Diagnosis Date    Preeclampsia        Past Surgical History:  Past Surgical History:   Procedure Laterality Date    PRIMARY C SECTION N/A 2022    Procedure:  SECTION, PRIMARY;  Surgeon: Araceli Cohen M.D.;  Location: SURGERY LABOR [facility] DELIVERY;  Service: Labor and Delivery       Current Outpatient Medications:  Current Outpatient Medications   Medication Sig Dispense Refill    predniSONE (DELTASONE) 10 MG Tab Take 4 Tablets by mouth every day. Taper as directed. 10 mg ever four days 40 Tablet 0    ibuprofen (MOTRIN) 600 MG Tab Take 1 Tablet by mouth every 6 hours as needed for Moderate Pain. Take with food and water. Max 4 tablets in 24 hours. (Patient not taking: Reported on 2022) 30 Tablet 3    docusate sodium (COLACE) 100 MG Cap Take 1 Capsule by mouth 2 times a day. (Patient not taking:  Reported on 9/20/2022) 60 Capsule 3    labetalol (NORMODYNE) 100 MG Tab Take 100 mg by mouth 2 times a day. Indications: Pregnancy-Induced Hypertension (Patient not taking: Reported on 9/20/2022)      Prenatal Vit-Fe Fumarate-FA (PRENATAL 1+1 PO) Take  by mouth every day. (Patient not taking: Reported on 9/20/2022)       No current facility-administered medications for this visit.       Allergies:  No Known Allergies    Family History:  Family History   Problem Relation Age of Onset    Cancer Maternal Aunt        Social History:  Social History     Socioeconomic History    Marital status:      Spouse name: Not on file    Number of children: Not on file    Years of education: Not on file    Highest education level: Not on file   Occupational History    Not on file   Tobacco Use    Smoking status: Never    Smokeless tobacco: Never   Vaping Use    Vaping Use: Former   Substance and Sexual Activity    Alcohol use: No    Drug use: No    Sexual activity: Yes     Partners: Male   Other Topics Concern    Not on file   Social History Narrative    Working- case manger     Social Determinants of Health     Financial Resource Strain: Not on file   Food Insecurity: Not on file   Transportation Needs: Not on file   Physical Activity: Not on file   Stress: Not on file   Social Connections: Not on file   Intimate Partner Violence: Not on file   Housing Stability: Not on file          Physical Exam:  Physical Exam    Oriented x 3  Weight/BMI: There is no height or weight on file to calculate BMI.  There were no vitals taken for this visit.    Base Eye Exam       Visual Acuity (Snellen - Linear)         Right Left    Dist sc 20/20 -1 20/20 -1              Tonometry (i-care, 10:13 AM)         Right Left    Pressure 19 20              Pupils         Pupils    Right PERRL    Left PERRL              Visual Fields         Right Left     Full Full              Extraocular Movement         Right Left     0 0 0   0  0   0 0 0    -1 -1  -1   0  0   0 0 0                 Neuro/Psych       Oriented x3: Yes    Mood/Affect: Normal                  Strabismus Exam       Correction: sc      Distance Near Near +3DS Near Bifocals                      0 0 0   -1 -1 -1                      RH(T)  0  0  RH(T)  0  0  RH(T)                      0 0 0   0 0 0                       Slit Lamp and Fundus Exam       External Exam         Right Left    External Normal Normal              Slit Lamp Exam         Right Left    Lids/Lashes 1+ Ptosis 1+ Ptosis    Conjunctiva/Sclera White and quiet White and quiet    Cornea Clear Clear    Anterior Chamber Deep and quiet Deep and quiet    Iris Round and reactive Round and reactive    Lens Clear Clear    Vitreous Normal Normal              Fundus Exam         Right Left    Disc Normal Normal    C/D Ratio 0.2 0.2    Macula Normal Normal    Vessels Normal Normal    Periphery Normal Normal                  Refraction       Manifest Refraction (Auto)         Sphere Cylinder Axis    Right -0.50 +0.50 082    Left -0.75 +0.75 093                    Pertinent Lab/Test/Imaging Review:      Assessment and Plan:     Thyroiditis  9/20/2022 for an recent laboratory test demonstrate a very low TSH and normal T4.  Given the high microsomal antibodies at one 441.0 in the past and a recent delivery, most likely this is postpartum thyroiditis according to endocrinology.  They recommended repeating thyroid antibody testing and they will evaluate.  10/4/2022-had repeat thyroid function tests including antibody testing and is following up with Dr. Raymond in endocrine.    Ophthalmoplegia  9/20/2022-very complex patient with a history of admission to [facility] in 2020 for a undiagnosed generalized peripheral weakness with subsequent bilateral ptosis and double vision.  She had an extensive work-up at that time that revealed a meningioma which was probably noncontributory.  She had acetylcholine receptor antibodies that were negative however  antimicrosomal antibody was elevated.  She did undergo a lumbar puncture test that was unremarkable although she did have positive oligoclonal bands.  She states that she was discharged home without treatment with improvement overall.  She has been doing well until 1 week ago when she had return of her ocular symptoms having bilateral ptosis and double vision.  She was seen by neurology at [place] who was uncertain of the diagnosis and referred her to ophthalmology.  She was then seen by optometry in [facility].  She then presented to the emergency room where the meningioma was unchanged.  On examination today she has bilateral ptosis with weakness of orbicularis strength.  She also has some slight upbeat nystagmus and elevation deficit of both eyes worse on the right than the left which is leading to a left hypertropia.  She has saccadic smooth pursuit movements and some fatigue.  Therefore it appears that she has had recurrence of her symptoms from back in 2020.  High in the differential diagnosis would be myasthenia gravis or some post partum precipitation of a Bet[place] variant of Guillain-Barré syndrome.  I therefore repeated her acetylcholine receptor antibodies, thyroid antibodies, anti-LISA 1 antibody, and recommended a trial of oral prednisone 60 mg to taper over the next 2 weeks.  She will discuss with pediatrics whether this is OK given that she is best breast-feeding.  Consideration could be for a trial of IVIG.  10/4/2020-stop taking oral prednisone after 2 days because she felt it was interfering with her milk production.  However overall she feels like her ptosis has improved as well as her amount of double vision.  On examination today therapist has been in improvement in her orbicularis strength.  She still has a mild elevation deficit of the left eye.  She also has intermittent hypertropia.  Therefore since it has not progressed and she feels as though it is improved slightly we  will continue to monitor.  There are rare case reports of Bet[place]el[place] variant of Guillain-Barré having us exacerbation after pregnancy.  However she just had her myasthenia gravis testing yesterday so will await results.        Ke Leigh M.D.

## 2022-10-04 NOTE — ASSESSMENT & PLAN NOTE
9/20/2022 for an recent laboratory test demonstrate a very low TSH and normal T4.  Given the high microsomal antibodies at one 441.0 in the past and a recent delivery, most likely this is postpartum thyroiditis according to endocrinology.  They recommended repeating thyroid antibody testing and they will evaluate.  10/4/2022-had repeat thyroid function tests including antibody testing and is following up with Dr. Raymond in endocrine.

## 2022-10-04 NOTE — ASSESSMENT & PLAN NOTE
9/20/2022-very complex patient with a history of admission to [facility] in 2020 for a undiagnosed generalized peripheral weakness with subsequent bilateral ptosis and double vision.  She had an extensive work-up at that time that revealed a meningioma which was probably noncontributory.  She had acetylcholine receptor antibodies that were negative however antimicrosomal antibody was elevated.  She did undergo a lumbar puncture test that was unremarkable although she did have positive oligoclonal bands.  She states that she was discharged home without treatment with improvement overall.  She has been doing well until 1 week ago when she had return of her ocular symptoms having bilateral ptosis and double vision.  She was seen by neurology at [place] who was uncertain of the diagnosis and referred her to ophthalmology.  She was then seen by optometry in [facility].  She then presented to the emergency room where the meningioma was unchanged.  On examination today she has bilateral ptosis with weakness of orbicularis strength.  She also has some slight upbeat nystagmus and elevation deficit of both eyes worse on the right than the left which is leading to a left hypertropia.  She has saccadic smooth pursuit movements and some fatigue.  Therefore it appears that she has had recurrence of her symptoms from back in 2020.  High in the differential diagnosis would be myasthenia gravis or some post partum precipitation of a Bet[place] variant of Guillain-Barré syndrome.  I therefore repeated her acetylcholine receptor antibodies, thyroid antibodies, anti-LISA 1 antibody, and recommended a trial of oral prednisone 60 mg to taper over the next 2 weeks.  She will discuss with pediatrics whether this is OK given that she is best breast-feeding.  Consideration could be for a trial of IVIG.  10/4/2020-stop taking oral prednisone after 2 days because she felt it was interfering with her milk production.  However  overall she feels like her ptosis has improved as well as her amount of double vision.  On examination today therapist has been in improvement in her orbicularis strength.  She still has a mild elevation deficit of the left eye.  She also has intermittent hypertropia.  Therefore since it has not progressed and she feels as though it is improved slightly we will continue to monitor.  There are rare case reports of Bet[place]el[place] variant of Guillain-Barré having us exacerbation after pregnancy.  However she just had her myasthenia gravis testing yesterday so will await results.

## 2022-10-05 LAB
SMA IGG SER-ACNC: 12 UNITS (ref 0–19)
STRIA MUS IGG SER QL IF: NORMAL

## 2022-10-06 ENCOUNTER — OFFICE VISIT (OUTPATIENT)
Dept: ENDOCRINOLOGY | Facility: MEDICAL CENTER | Age: 27
End: 2022-10-06
Attending: INTERNAL MEDICINE
Payer: COMMERCIAL

## 2022-10-06 VITALS
OXYGEN SATURATION: 97 % | DIASTOLIC BLOOD PRESSURE: 70 MMHG | BODY MASS INDEX: 27.49 KG/M2 | HEIGHT: 70 IN | HEART RATE: 108 BPM | SYSTOLIC BLOOD PRESSURE: 122 MMHG | WEIGHT: 192 LBS

## 2022-10-06 DIAGNOSIS — E03.9 PRIMARY HYPOTHYROIDISM: ICD-10-CM

## 2022-10-06 DIAGNOSIS — E06.3 HASHIMOTO'S DISEASE: ICD-10-CM

## 2022-10-06 LAB
GD1A GANGL AB SER IA-ACNC: 199 IV (ref 0–50)
GD1B GANGL AB SER IA-ACNC: 16 IV (ref 0–50)
GM1 ASIALO AB SER IA-ACNC: 10 IV (ref 0–50)
GM1 GANGL IGG+IGM SER QL IA: 12 IV (ref 0–50)
GM2 GANGL IGG+IGM SER QL IA: 13 IV (ref 0–50)
GQ1B GANGL AB SER-ACNC: 44 IV (ref 0–50)

## 2022-10-06 PROCEDURE — 99205 OFFICE O/P NEW HI 60 MIN: CPT | Performed by: INTERNAL MEDICINE

## 2022-10-06 PROCEDURE — 99211 OFF/OP EST MAY X REQ PHY/QHP: CPT | Performed by: INTERNAL MEDICINE

## 2022-10-06 RX ORDER — LEVOTHYROXINE SODIUM 0.05 MG/1
50 TABLET ORAL
Qty: 30 TABLET | Refills: 2 | Status: SHIPPED | OUTPATIENT
Start: 2022-10-06 | End: 2022-11-02

## 2022-10-06 ASSESSMENT — FIBROSIS 4 INDEX: FIB4 SCORE: 0.24

## 2022-10-06 NOTE — PROGRESS NOTES
Chief Complaint: Consult requested by LISANDRO Chavarria for evaluation of Hypothyroidism secondary to postpartum thyroiditis    HPI:     Mercy Almeida is a 27 y.o. female with   New onset thyroid dysfunction 3 months postdelivery of her baby.    She delivered a healthy baby boy on June 29, 2022 at 35 weeks age of gestation due to preeclampsia    3 months postdelivery she developed abnormal thyroid labs with a suppressed TSH and normal free T4 level of 1.63 consistent with postpartum thyroiditis    She also developed some ophthalmoplegia and ocular muscle weakness and is being followed by neuro-ophthalmology with differentials of [place] Barré syndrome [place] variant versus myasthenia gravis    Incidentally in 2020 she had a similar occurrence and she had positive oligoclonal bands on CSF analysis but its not clear whether she was treated she also had markedly elevated TPO antibodies of over 1000.  In 2020 and had briefly abnormal thyroid labs on May 2020 with a TSH of 4.66 but TSH improved to normal at 1.9 on August 2020 with labs done by Dr. Barnett        Going back to her current problem after she was found to be hyperthyroid on September 15, 2022 she was referred to endocrinology    She repeated thyroid labs on October 3, 2022 showing resolution of her hyperthyroidism and development of new onset hypothyroidism TSH is 6.8 with a low free T4 of 0.81 which is still consistent with the typical pattern of postpartum thyroiditis      She reports that her eye symptoms are getting better she denies swelling of her neck.  She denies pain in her neck.  She reports fatigue.  She denies constipation and cold intolerance        Patient's medications, allergies, and social histories were reviewed and updated as appropriate.      ROS:     CONS:     No fever, no chills, no weight loss, reports fatigue   EYES:      Reports diplopia, no blurry vision, no redness of eyes, no swelling of  eyelids   ENT:    No hearing loss, No ear pain, No sore throat, no dysphagia, no neck swelling   CV:     No chest pain, no palpitations, no claudication, no orthopnea, no PND   PULM:    No SOB, no cough, no hemoptysis, no wheezing    GI:   No nausea, no vomiting, no diarrhea, no constipation, no bloody stools   :  Passing urine well, no dysuria, no hematuria   ENDO:   No polyuria, no polydipsia, no heat intolerance, no cold intolerance   NEURO: No headaches, no dizziness, no convulsions, no tremors   MUSC:  No joint swellings, no arthralgias, no myalgias, no weakness   SKIN:   No rash, no ulcers, no dry skin   PSYCH:   No depression, no anxiety, no difficulty sleeping       Past Medical History:  Patient Active Problem List    Diagnosis Date Noted    Primary hypothyroidism 10/06/2022    Hashimoto's disease 10/06/2022    Postpartum thyroiditis 2022    Ophthalmoplegia 2022    CNS demyelination (HCC) 2022    PIH (pregnancy induced hypertension) 2022    Encounter for induction of labor 2022    Vitamin D deficiency 01/10/2020    Weakness 2020    Meningioma (HCC) 2020       Past Surgical History:  Past Surgical History:   Procedure Laterality Date    PRIMARY C SECTION N/A 2022    Procedure:  SECTION, PRIMARY;  Surgeon: Araceli Cohen M.D.;  Location: [facility];  Service: Labor and Delivery        Allergies:  Patient has no known allergies.     Current Medications:    Current Outpatient Medications:     predniSONE (DELTASONE) 10 MG Tab, Take 4 Tablets by mouth every day. Taper as directed. 10 mg ever four days (Patient not taking: Reported on 10/6/2022), Disp: 40 Tablet, Rfl: 0    ibuprofen (MOTRIN) 600 MG Tab, Take 1 Tablet by mouth every 6 hours as needed for Moderate Pain. Take with food and water. Max 4 tablets in 24 hours. (Patient not taking: No sig reported), Disp: 30 Tablet, Rfl: 3    docusate sodium (COLACE) 100 MG Cap, Take  "1 Capsule by mouth 2 times a day. (Patient not taking: No sig reported), Disp: 60 Capsule, Rfl: 3    labetalol (NORMODYNE) 100 MG Tab, Take 100 mg by mouth 2 times a day. Indications: Pregnancy-Induced Hypertension (Patient not taking: No sig reported), Disp: , Rfl:     Prenatal Vit-Fe Fumarate-FA (PRENATAL 1+1 PO), Take  by mouth every day. (Patient not taking: No sig reported), Disp: , Rfl:     Social History:  Social History     Socioeconomic History    Marital status:      Spouse name: Not on file    Number of children: Not on file    Years of education: Not on file    Highest education level: Not on file   Occupational History    Not on file   Tobacco Use    Smoking status: Never    Smokeless tobacco: Never   Vaping Use    Vaping Use: Former   Substance and Sexual Activity    Alcohol use: No    Drug use: No    Sexual activity: Yes     Partners: Male   Other Topics Concern    Not on file   Social History Narrative    Working- case manger     Social Determinants of Health     Financial Resource Strain: Not on file   Food Insecurity: Not on file   Transportation Needs: Not on file   Physical Activity: Not on file   Stress: Not on file   Social Connections: Not on file   Intimate Partner Violence: Not on file   Housing Stability: Not on file        Family History:   Family History   Problem Relation Age of Onset    Cancer Maternal Aunt          PHYSICAL EXAM:   Vital signs: /70 (BP Location: Left arm, Patient Position: Sitting, BP Cuff Size: Adult)   Pulse (!) 108   Ht 1.778 m (5' 10\")   Wt 87.1 kg (192 lb)   SpO2 97%   Breastfeeding Yes   BMI 27.55 kg/m²   GENERAL: Well-developed, well-nourished  in no apparent distress.   EYE: No ocular and eyelid asymmetry, Anicteric sclerae,  PERRL  HENT: Hearing grossly intact, Normocephalic, atraumatic. Pink, moist mucous membranes, No exudate  NECK: Supple. Trachea midline. thyroid enlarged, smooth, nontender, no nodules  CARDIOVASCULAR: Regular rate and " rhythm. No murmurs, rubs, or gallops.   LUNGS: Clear to auscultation bilaterally   ABDOMEN: Soft, nontender with positive bowel sounds.   EXTREMITIES: No clubbing, cyanosis, or edema.   NEUROLOGICAL: Cranial nerves II-XII are grossly intact   Symmetric reflexes at the patella no proximal muscle weakness  LYMPH: No cervical, supraclavicular,  adenopathy palpated.   SKIN: No rashes, lesions. Turgor is normal.    Labs:  Lab Results   Component Value Date/Time    WBC 5.2 10/03/2022 06:38 AM    RBC 5.96 (H) 10/03/2022 06:38 AM    HEMOGLOBIN 14.8 10/03/2022 06:38 AM    MCV 79.0 (L) 10/03/2022 06:38 AM    MCH 24.8 (L) 10/03/2022 06:38 AM    MCHC 31.4 (L) 10/03/2022 06:38 AM    RDW 43.9 10/03/2022 06:38 AM    MPV 9.2 10/03/2022 06:38 AM       Lab Results   Component Value Date/Time    SODIUM 141 10/03/2022 06:38 AM    POTASSIUM 3.9 10/03/2022 06:38 AM    CHLORIDE 105 10/03/2022 06:38 AM    CO2 25 10/03/2022 06:38 AM    ANION 11.0 10/03/2022 06:38 AM    GLUCOSE 77 10/03/2022 06:38 AM    BUN 22 10/03/2022 06:38 AM    CREATININE 0.92 10/03/2022 06:38 AM    CALCIUM 9.5 10/03/2022 06:38 AM    ASTSGOT 12 10/03/2022 06:38 AM    ALTSGPT 12 10/03/2022 06:38 AM    TBILIRUBIN 0.4 10/03/2022 06:38 AM    ALBUMIN 4.3 10/03/2022 06:38 AM    TOTPROTEIN 7.0 10/03/2022 06:38 AM    GLOBULIN 2.7 10/03/2022 06:38 AM    AGRATIO 1.6 10/03/2022 06:38 AM       Lab Results   Component Value Date/Time    CHOLSTRLTOT 131 08/17/2020 0815    TRIGLYCERIDE 29 08/17/2020 0815    HDL 57 08/17/2020 0815    LDL 68 08/17/2020 0815       Lab Results   Component Value Date/Time    TSHULTRASEN 6.870 (H) 10/03/2022 0638     Lab Results   Component Value Date/Time    FREET4 0.81 (L) 10/03/2022 0644     Lab Results   Component Value Date/Time    FREET3 2.81 08/17/2020 0815     No results found for: THYSTIMIG    Lab Results   Component Value Date/Time    MICROSOMALA 1441.0 (H) 01/27/2020 1327         Imaging:      ASSESSMENT/PLAN:     1. Postpartum thyroiditis  This  is the reason why her thyroid function has been fluctuating  I explained the pathogenesis of this condition  She has a predisposing factor for hypothyroidism which is her thyroid autoantibodies  I discussed with her that she may develop permanent hypothyroidism because of the predisposing factor      2. Primary hypothyroidism  Uncontrolled  TSH is elevated free T4 is low  Start levothyroxine 50 MCG daily  Reviewed how to properly take levothyroxine  Repeat thyroid labs in 6 weeks  Follow-up with me in 3 months with thyroid ultrasound and repeat thyroid labs    3. Hashimoto's disease  This is the underlying risk factor why she developed hypothyroidism after episode of postpartum thyroiditis      Return in about 3 months (around 1/6/2023).      This patient during there office visit was started on new medication.  Side effects of new medications were discussed with the patient today in the office. The patient was supplied paperwork on this new medication.    Thank you kindly for allowing me to participate in the thyroid care plan for this patient.    Total time spent on date of service was over 60 minutes which included obtaining a detailed history and physical exam, ordering labs, courting care and scheduling future follow-up    Shadi Raymond MD, [facility], [facility]  10/06/22    CC:   LISANDRO Chavarria

## 2022-10-07 LAB
ACHR BIND AB SER-SCNC: 0 NMOL/L (ref 0–0.4)
ACHR BLOCK AB/ACHR TOTAL SFR SER: 16 % (ref 0–26)

## 2022-10-08 LAB — TSI SER-ACNC: <0.1 IU/L

## 2022-10-12 ENCOUNTER — DOCUMENTATION (OUTPATIENT)
Dept: OPHTHALMOLOGY | Facility: MEDICAL CENTER | Age: 27
End: 2022-10-12
Payer: COMMERCIAL

## 2022-10-12 DIAGNOSIS — H49.9 OPHTHALMOPLEGIA: ICD-10-CM

## 2022-10-12 NOTE — ASSESSMENT & PLAN NOTE
9/20/2022-very complex patient with a history of admission to [facility] in 2020 for a undiagnosed generalized peripheral weakness with subsequent bilateral ptosis and double vision.  She had an extensive work-up at that time that revealed a meningioma which was probably noncontributory.  She had acetylcholine receptor antibodies that were negative however antimicrosomal antibody was elevated.  She did undergo a lumbar puncture test that was unremarkable although she did have positive oligoclonal bands.  She states that she was discharged home without treatment with improvement overall.  She has been doing well until 1 week ago when she had return of her ocular symptoms having bilateral ptosis and double vision.  She was seen by neurology at [place] who was uncertain of the diagnosis and referred her to ophthalmology.  She was then seen by optometry in [facility].  She then presented to the emergency room where the meningioma was unchanged.  On examination today she has bilateral ptosis with weakness of orbicularis strength.  She also has some slight upbeat nystagmus and elevation deficit of both eyes worse on the right than the left which is leading to a left hypertropia.  She has saccadic smooth pursuit movements and some fatigue.  Therefore it appears that she has had recurrence of her symptoms from back in 2020.  High in the differential diagnosis would be myasthenia gravis or some post partum precipitation of a [place] variant of Guillain-Barré syndrome.  I therefore repeated her acetylcholine receptor antibodies, thyroid antibodies, anti-LISA 1 antibody, and recommended a trial of oral prednisone 60 mg to taper over the next 2 weeks.  She will discuss with pediatrics whether this is OK given that she is best breast-feeding.  Consideration could be for a trial of IVIG.  10/4/2020-stop taking oral prednisone after 2 days because she felt it was interfering with her milk production.  However  overall she feels like her ptosis has improved as well as her amount of double vision.  On examination today therapist has been in improvement in her orbicularis strength.  She still has a mild elevation deficit of the left eye.  She also has intermittent hypertropia.  Therefore since it has not progressed and she feels as though it is improved slightly we will continue to monitor.  There are rare case reports of [place]el[place] variant of Guillain-Barré having an exacerbation after pregnancy.  However she just had her myasthenia gravis testing yesterday so will await results.  10/12/2022 - Review of labs TSI, myasthenia panel, anti smooth muscle abs negative. However GD1a ab elevated at 199 which is associated with different variants of GBS

## 2022-11-02 DIAGNOSIS — E03.9 PRIMARY HYPOTHYROIDISM: ICD-10-CM

## 2022-11-02 RX ORDER — LEVOTHYROXINE SODIUM 0.05 MG/1
TABLET ORAL
Qty: 30 TABLET | Refills: 2 | Status: SHIPPED | OUTPATIENT
Start: 2022-11-02

## 2022-11-04 ENCOUNTER — OFFICE VISIT (OUTPATIENT)
Dept: NEUROLOGY | Facility: MEDICAL CENTER | Age: 27
End: 2022-11-04
Attending: PSYCHIATRY & NEUROLOGY
Payer: COMMERCIAL

## 2022-11-04 VITALS
OXYGEN SATURATION: 97 % | RESPIRATION RATE: 18 BRPM | HEART RATE: 68 BPM | DIASTOLIC BLOOD PRESSURE: 76 MMHG | SYSTOLIC BLOOD PRESSURE: 100 MMHG | WEIGHT: 190.48 LBS | BODY MASS INDEX: 27.27 KG/M2 | TEMPERATURE: 97.8 F | HEIGHT: 70 IN

## 2022-11-04 DIAGNOSIS — R53.1 WEAKNESS: Primary | ICD-10-CM

## 2022-11-04 PROCEDURE — 99205 OFFICE O/P NEW HI 60 MIN: CPT | Performed by: PSYCHIATRY & NEUROLOGY

## 2022-11-04 PROCEDURE — 99212 OFFICE O/P EST SF 10 MIN: CPT | Performed by: PSYCHIATRY & NEUROLOGY

## 2022-11-04 PROCEDURE — 99202 OFFICE O/P NEW SF 15 MIN: CPT | Performed by: PSYCHIATRY & NEUROLOGY

## 2022-11-04 ASSESSMENT — FIBROSIS 4 INDEX: FIB4 SCORE: 0.24

## 2022-11-04 NOTE — PROGRESS NOTES
"[facility]  MULTIPLE SCLEROSIS & NEUROIMMUNOLOGY  NEW PATIENT VISIT    Referral source: Ke Leigh MD    DISEASE SUMMARY:  Principal neurologic diagnosis: n/a  Diagnosis of MS: n/a  Disease History:  - 1/2020: woke up with weakness involving the upper>lower extremities, worsened over several days, developed double vision, constipation, difficulty emptying bladder, and UTI  - 2/2020: symptoms resolved completely  - 9/2020: return of double vision, onset of bilateral ptosis, return of mild weakness in the upper>lower extremities, no additional workup  - 1/2021: symptoms resolved completely (recovery took longer after this episode than the first)  - 10/2021: became pregnant  - 6/29/2022: delivered first child (boy); pregnancy complicated by pre-eclampsia  - 9/2022: recurrence of ptosis, worse than previous; recurrence of double vision; recurrence of bowel/bladder symptoms  - 10/2022: took prednisone 60 mg x3 days, stopped because of effects on milk supply, no noticeable benefit  Disease course at onset: n/a  Current disease course: n/a  Previous disease therapies:  - none  Current disease therapies:  - none  Symptomatic therapies:  - none  CSF (1/11/2020):  - OCBs: positive (4)  Other Testing:  - anti-AQP4 Ab (12/26/2020): <1.5  - AChR Abs (10/7/2022): negative  MRI head:  - 9/15/2022: stable meningioma in the left parasagittal parietal region  - 1/10/2020: pre report, \"25 mm calcific/ossific lesion at the left frontal vertex consistent with an entirely calcified meningioma or inner table osteoma\"  MRI cervical spine:  - 1/10/2020: WNL  MRI thoracic spine:  - 1/10/2020: WNL    CC: \"optic neuritis, ophthalmoplegia, ptosis of both eyes\"    HISTORY OF ILLNESS:  Mercy Almeida is a 27 y.o. woman with a history most notable for ophthalmoplegia, meningioma, Hashimoto's disease, and hypothyroidism.  Today, she was unaccompanied, and she provided the following history:    I have summarized " "Mercy's pertinent history in \"disease summary\" above.    She has never experienced numbness, dysphagia, or difficulty breathing.    MEDICAL AND SURGICAL HISTORY:  Past Medical History:   Diagnosis Date    Preeclampsia     Thyroid disease      Past Surgical History:   Procedure Laterality Date    PRIMARY C SECTION N/A 2022    Procedure:  SECTION, PRIMARY;  Surgeon: Araceli Cohen M.D.;  Location: [facility];  Service: Labor and Delivery     MEDICATIONS:  Current Outpatient Medications   Medication Sig    levothyroxine (SYNTHROID) 50 MCG Tab TAKE 1 TABLET BY MOUTH EVERY DAY IN THE MORNING ON AN EMPTY STOMACH     SOCIAL HISTORY:  Social History     Tobacco Use    Smoking status: Never    Smokeless tobacco: Never   Substance Use Topics    Alcohol use: No     Social History     Social History Narrative    Working- case manger     FAMILY HISTORY:  Family History   Problem Relation Age of Onset    Cancer Maternal Aunt      REVIEW OF SYSTEMS:  A ROS was completed.  Pertinent positives and negatives were included in the HPI, above.  All other systems were reviewed and are negative.    PHYSICAL EXAM:  General/Medical:  - NAD  - hair, skin, nails, and joints were normal    Neuro:  MENTAL STATUS: awake and alert; no deficits of speech or language; oriented to conversation; affect was appropriate to situation    CRANIAL NERVES:    II: acuity: J1+/J1+ uncorrected, fields: intact to confrontation, pupils: 3/3 to 2/2 without a relative afferent pupillary defect, discs: sharp    III/IV/VI: versions: intact without nystagmus; ptosis, bilaterally    V: facial sensation: symmetric to light touch    VII: facial expression: symmetric    VIII: hearing: intact to finger rub    IX/X: palate: elevates symmetrically    XI: shoulder shrug: symmetric    XII: tongue: midline    MOTOR:  - bulk: normal throughout  - tone: normal in the upper extremities  Upper Extremity Strength  (R/L)    5/5 "   Elbow flexion 5/5   Elbow extension 5/5   Shoulder abduction 5/5     Lower Extremity Strength  (R/L)   Hip flexion 5/5   Knee extension 5/5   Knee flexion 5/5   Ankle plantarflexion 5/5   Ankle dorsiflexion 5/5     - can walk on toes and heels  - pronator drift: absent  - abnormal movements: none    SENSATION:  - light touch: grossly intact over the upper- and lower extremities  - vibration (R/L, seconds): NT/NT at the great toes  - pinprick: NT  - proprioception: NT  - Romberg: absent    COORDINATION:  - finger to nose: normal, no ataxia on exam  - finger tapping: rapid and accurate, bilaterally    REFLEXES:  Reflex Right Left   BR 2+ 2+   Biceps 2+ 2+   Triceps 2+ 2+   Patellae 2+ 2+   Achilles NT NT   Toes NT NT     GAIT:  - narrow base and normal  - heel-raised/toe-raised gait: intact/intact  - tandem gait: intact    QUANTITATIVE SCORES:  Timed 25-foot walk (sec): NT.  Assistive device: none    REVIEW OF IMAGING STUDIES:  I have summarized pertinent data above.    REVIEW OF LABORATORY STUDIES:  I have summarized pertinent data above.    ASSESSMENT:  Mercy Almeida is a 27 y.o. woman with episodic ptosis (bilateral, and generalized weakness with a history most notable for meningioma, Hashimoto's disease, and hypothyroidism.  Differential diagnosis includes MG (history isn't classic for this, antibody testing has been negative, could consider a pyridostigmine trial), LEMS (will order EMG/NCS to look for increment), CPEO (mitochondrial myopathy, I have reached out to a contact at [employer] for guidance), and myotonic dystrophy (will investigate options for genetic testing).    PLAN:  MG?  - will consider a pyridostigmine trial  LEMS?  - will consider EMG/NCS  CPEO?  - serum lactate level  - serum pyruvate level  - will investigate options for genetic testing for CPEO (to include POLG, E32jct8, RRM2B, XRY29H7, POLG2, DGUOK, and SPG7); I have reached out to a contact at [employer] for guidance  - if  "genetic testing is negative, could consider skeletal muscle biopsy  Myotonic Dystrophy?  - will consider genetic testing    Follow-Up:  - Return in about 3 months (around 2/4/2023).    Signed: Heriberto Coelho M.D.    BILLING DOCUMENTATION:   I spent 63 minutes reviewing the medical record, interviewing and examining the patient, discussing my impression (see \"assessment\" above), and coordinating care.      "

## 2022-11-28 ENCOUNTER — OFFICE VISIT (OUTPATIENT)
Dept: OPHTHALMOLOGY | Facility: MEDICAL CENTER | Age: 27
End: 2022-11-28
Payer: COMMERCIAL

## 2022-11-28 DIAGNOSIS — G37.9 CNS DEMYELINATION (HCC): ICD-10-CM

## 2022-11-28 DIAGNOSIS — H49.9 OPHTHALMOPLEGIA: ICD-10-CM

## 2022-11-28 PROCEDURE — 99213 OFFICE O/P EST LOW 20 MIN: CPT | Performed by: OPHTHALMOLOGY

## 2022-11-28 ASSESSMENT — ENCOUNTER SYMPTOMS: EYE PAIN: 1

## 2022-11-28 ASSESSMENT — SLIT LAMP EXAM - LIDS
COMMENTS: 1+ PTOSIS
COMMENTS: 1+ PTOSIS

## 2022-11-28 ASSESSMENT — TONOMETRY
IOP_METHOD: I-CARE
OS_IOP_MMHG: 24
OD_IOP_MMHG: 24

## 2022-11-28 ASSESSMENT — VISUAL ACUITY
OD_SC: 20/25
OS_SC: 20/20
METHOD: SNELLEN - LINEAR

## 2022-11-28 ASSESSMENT — EXTERNAL EXAM - RIGHT EYE: OD_EXAM: NORMAL

## 2022-11-28 ASSESSMENT — REFRACTION_MANIFEST
OS_SPHERE: -0.50
OS_AXIS: 086
OS_CYLINDER: +0.50
OD_SPHERE: -0.50
OD_AXIS: 089
OD_CYLINDER: +0.75
METHOD_AUTOREFRACTION: 1

## 2022-11-28 ASSESSMENT — CUP TO DISC RATIO
OD_RATIO: 0.2
OS_RATIO: 0.2

## 2022-11-28 ASSESSMENT — CONF VISUAL FIELD
OD_INFERIOR_TEMPORAL_RESTRICTION: 0
OD_NORMAL: 1
OD_SUPERIOR_NASAL_RESTRICTION: 0
OD_SUPERIOR_TEMPORAL_RESTRICTION: 0
OD_INFERIOR_NASAL_RESTRICTION: 0

## 2022-11-28 ASSESSMENT — EXTERNAL EXAM - LEFT EYE: OS_EXAM: NORMAL

## 2022-11-28 NOTE — ASSESSMENT & PLAN NOTE
9/20/2022-on her admission back in 2020 she did have elevated oligoclonal bands however no increased protein in the CSF.  OCT nerve fiber layer thickness was normal at 96 OD and 101 OS without evidence of optic nerve involvement  11/28/2022 -OD stable

## 2022-11-28 NOTE — PROGRESS NOTES
Peds/Neuro Ophthalmology:   Ke Leigh M.D.    Date & Time note created:    2022   4:06 PM     Referring MD / APRN:  LISANDRO Chavarria, No att. providers found    Patient ID:  Name:             Mercy Nassar   YOB: 1995  Age:                 27 y.o.  female   MRN:               [id number]    Chief Complaint/Reason for Visit:     Other (8 week follow up for Ophthalmoplegia OU)      History of Present Illness:    Mercy Almeida is a 27 y.o. female   8 week follow up for ophthalmoplegia in both eyes. Pt feels that vision is getting better in both eyes. Still very light sensitive. No pain. No headaches.       Review of Systems:  Review of Systems   Eyes:  Positive for pain.   All other systems reviewed and are negative.    Past Medical History:   Past Medical History:   Diagnosis Date    Preeclampsia     Thyroid disease        Past Surgical History:  Past Surgical History:   Procedure Laterality Date    PRIMARY C SECTION N/A 2022    Procedure:  SECTION, PRIMARY;  Surgeon: Araceli Cohen M.D.;  Location: [facility];  Service: Labor and Delivery       Current Outpatient Medications:  Current Outpatient Medications   Medication Sig Dispense Refill    levothyroxine (SYNTHROID) 50 MCG Tab TAKE 1 TABLET BY MOUTH EVERY DAY IN THE MORNING ON AN EMPTY STOMACH 30 Tablet 2     No current facility-administered medications for this visit.       Allergies:  No Known Allergies    Family History:  Family History   Problem Relation Age of Onset    Cancer Maternal Aunt        Social History:  Social History     Socioeconomic History    Marital status:      Spouse name: Not on file    Number of children: Not on file    Years of education: Not on file    Highest education level: Not on file   Occupational History    Not on file   Tobacco Use    Smoking status: Never    Smokeless tobacco: Never   Vaping  Use    Vaping Use: Former   Substance and Sexual Activity    Alcohol use: No    Drug use: No    Sexual activity: Yes     Partners: Male   Other Topics Concern    Not on file   Social History Narrative    Working- case manger     Social Determinants of Health     Financial Resource Strain: Not on file   Food Insecurity: Not on file   Transportation Needs: Not on file   Physical Activity: Not on file   Stress: Not on file   Social Connections: Not on file   Intimate Partner Violence: Not on file   Housing Stability: Not on file          Physical Exam:  Physical Exam    Oriented x 3  Weight/BMI: There is no height or weight on file to calculate BMI.  There were no vitals taken for this visit.    Base Eye Exam       Visual Acuity (Snellen - Linear)         Right Left    Dist sc 20/25 20/20              Tonometry (i-care, 8:20 AM)         Right Left    Pressure 24 24              Pupils         Pupils    Right PERRL    Left PERRL              Visual Fields         Right Left     Full               Neuro/Psych       Oriented x3: Yes    Mood/Affect: Normal                  Strabismus Exam         0 0 0   0 0 0                       0  0   0  0                       0 0 0   0 0 0                       Slit Lamp and Fundus Exam       External Exam         Right Left    External Normal Normal              Slit Lamp Exam         Right Left    Lids/Lashes 1+ Ptosis 1+ Ptosis    Conjunctiva/Sclera White and quiet White and quiet    Cornea Clear Clear    Anterior Chamber Deep and quiet Deep and quiet    Iris Round and reactive Round and reactive    Lens Clear Clear    Vitreous Normal Normal              Fundus Exam         Right Left    Disc Normal Normal    C/D Ratio 0.2 0.2    Macula Normal Normal    Vessels Normal Normal    Periphery Normal Normal                  Refraction       Manifest Refraction (Auto)         Sphere Cylinder Axis    Right -0.50 +0.75 089    Left -0.50 +0.50 086                    Pertinent  Lab/Test/Imaging Review:      Assessment and Plan:     Ophthalmoplegia  9/20/2022-very complex patient with a history of admission to [facility] in 2020 for a undiagnosed generalized peripheral weakness with subsequent bilateral ptosis and double vision.  She had an extensive work-up at that time that revealed a meningioma which was probably noncontributory.  She had acetylcholine receptor antibodies that were negative however antimicrosomal antibody was elevated.  She did undergo a lumbar puncture test that was unremarkable although she did have positive oligoclonal bands.  She states that she was discharged home without treatment with improvement overall.  She has been doing well until 1 week ago when she had return of her ocular symptoms having bilateral ptosis and double vision.  She was seen by neurology at [place] who was uncertain of the diagnosis and referred her to ophthalmology.  She was then seen by optometry in [facility].  She then presented to the emergency room where the meningioma was unchanged.  On examination today she has bilateral ptosis with weakness of orbicularis strength.  She also has some slight upbeat nystagmus and elevation deficit of both eyes worse on the right than the left which is leading to a left hypertropia.  She has saccadic smooth pursuit movements and some fatigue.  Therefore it appears that she has had recurrence of her symptoms from back in 2020.  High in the differential diagnosis would be myasthenia gravis or some post partum precipitation of a Betts Skelton variant of Guillain-Barré syndrome.  I therefore repeated her acetylcholine receptor antibodies, thyroid antibodies, anti-LISA 1 antibody, and recommended a trial of oral prednisone 60 mg to taper over the next 2 weeks.  She will discuss with pediatrics whether this is OK given that she is best breast-feeding.  Consideration could be for a trial of IVIG.  10/4/2020-stop taking oral prednisone after 2 days  because she felt it was interfering with her milk production.  However overall she feels like her ptosis has improved as well as her amount of double vision.  On examination today therapist has been in improvement in her orbicularis strength.  She still has a mild elevation deficit of the left eye.  She also has intermittent hypertropia.  Therefore since it has not progressed and she feels as though it is improved slightly we will continue to monitor.  There are rare case reports of Betts Skelton variant of Guillain-Barré having an exacerbation after pregnancy.  However she just had her myasthenia gravis testing yesterday so will await results.  10/12/2022 - Review of labs TSI, myasthenia panel, anti smooth muscle abs negative. However GD1a ab elevated at 199 which is associated with different variants of GBS  11/28/2022 -essentially resolved extraocular motility elevation deficit.  Still with mild ptosis.  Discussed at length Guillain-Barré syndrome and Betts Skelton variant.  Since overall improving we will continue to monitor    CNS demyelination (HCC)  9/20/2022-on her admission back in 2020 she did have elevated oligoclonal bands however no increased protein in the CSF.  OCT nerve fiber layer thickness was normal at 96 OD and 101 OS without evidence of optic nerve involvement  11/28/2022 -OD stable    Postpartum thyroiditis  11/28/2022-managed by Dr. Segundo Leigh M.D.

## 2022-11-28 NOTE — ASSESSMENT & PLAN NOTE
9/20/2022-very complex patient with a history of admission to [facility] in 2020 for a undiagnosed generalized peripheral weakness with subsequent bilateral ptosis and double vision.  She had an extensive work-up at that time that revealed a meningioma which was probably noncontributory.  She had acetylcholine receptor antibodies that were negative however antimicrosomal antibody was elevated.  She did undergo a lumbar puncture test that was unremarkable although she did have positive oligoclonal bands.  She states that she was discharged home without treatment with improvement overall.  She has been doing well until 1 week ago when she had return of her ocular symptoms having bilateral ptosis and double vision.  She was seen by neurology at [place] who was uncertain of the diagnosis and referred her to ophthalmology.  She was then seen by optometry in [facility].  She then presented to the emergency room where the meningioma was unchanged.  On examination today she has bilateral ptosis with weakness of orbicularis strength.  She also has some slight upbeat nystagmus and elevation deficit of both eyes worse on the right than the left which is leading to a left hypertropia.  She has saccadic smooth pursuit movements and some fatigue.  Therefore it appears that she has had recurrence of her symptoms from back in 2020.  High in the differential diagnosis would be myasthenia gravis or some post partum precipitation of a Betts Skelton variant of Guillain-Barré syndrome.  I therefore repeated her acetylcholine receptor antibodies, thyroid antibodies, anti-LISA 1 antibody, and recommended a trial of oral prednisone 60 mg to taper over the next 2 weeks.  She will discuss with pediatrics whether this is OK given that she is best breast-feeding.  Consideration could be for a trial of IVIG.  10/4/2020-stop taking oral prednisone after 2 days because she felt it was interfering with her milk production.  However  overall she feels like her ptosis has improved as well as her amount of double vision.  On examination today therapist has been in improvement in her orbicularis strength.  She still has a mild elevation deficit of the left eye.  She also has intermittent hypertropia.  Therefore since it has not progressed and she feels as though it is improved slightly we will continue to monitor.  There are rare case reports of Betts Skelton variant of Guillain-Barré having an exacerbation after pregnancy.  However she just had her myasthenia gravis testing yesterday so will await results.  10/12/2022 - Review of labs TSI, myasthenia panel, anti smooth muscle abs negative. However GD1a ab elevated at 199 which is associated with different variants of GBS  11/28/2022 -essentially resolved extraocular motility elevation deficit.  Still with mild ptosis.  Discussed at length Guillain-Barré syndrome and Betts Skelton variant.  Since overall improving we will continue to monitor

## 2023-01-03 ENCOUNTER — HOSPITAL ENCOUNTER (OUTPATIENT)
Dept: LAB | Facility: MEDICAL CENTER | Age: 28
End: 2023-01-03
Attending: NURSE PRACTITIONER
Payer: COMMERCIAL

## 2023-01-03 ENCOUNTER — HOSPITAL ENCOUNTER (OUTPATIENT)
Dept: LAB | Facility: MEDICAL CENTER | Age: 28
End: 2023-01-03
Attending: INTERNAL MEDICINE
Payer: COMMERCIAL

## 2023-01-03 DIAGNOSIS — E06.3 HASHIMOTO'S DISEASE: ICD-10-CM

## 2023-01-03 DIAGNOSIS — E03.9 PRIMARY HYPOTHYROIDISM: ICD-10-CM

## 2023-01-03 DIAGNOSIS — R53.1 WEAKNESS: ICD-10-CM

## 2023-01-03 LAB
ALBUMIN SERPL BCP-MCNC: 4.5 G/DL (ref 3.2–4.9)
ALBUMIN/GLOB SERPL: 1.5 G/DL
ALP SERPL-CCNC: 112 U/L (ref 30–99)
ALT SERPL-CCNC: 12 U/L (ref 2–50)
ANION GAP SERPL CALC-SCNC: 14 MMOL/L (ref 7–16)
AST SERPL-CCNC: 19 U/L (ref 12–45)
BILIRUB SERPL-MCNC: 0.5 MG/DL (ref 0.1–1.5)
BUN SERPL-MCNC: 23 MG/DL (ref 8–22)
CALCIUM ALBUM COR SERPL-MCNC: 9.6 MG/DL (ref 8.5–10.5)
CALCIUM SERPL-MCNC: 10 MG/DL (ref 8.5–10.5)
CHLORIDE SERPL-SCNC: 107 MMOL/L (ref 96–112)
CO2 SERPL-SCNC: 25 MMOL/L (ref 20–33)
CREAT SERPL-MCNC: 0.88 MG/DL (ref 0.5–1.4)
ERYTHROCYTE [DISTWIDTH] IN BLOOD BY AUTOMATED COUNT: 49.1 FL (ref 35.9–50)
FERRITIN SERPL-MCNC: 24 NG/ML (ref 10–291)
GFR SERPLBLD CREATININE-BSD FMLA CKD-EPI: 91 ML/MIN/1.73 M 2
GLOBULIN SER CALC-MCNC: 3 G/DL (ref 1.9–3.5)
GLUCOSE SERPL-MCNC: 69 MG/DL (ref 65–99)
HCT VFR BLD AUTO: 49 % (ref 37–47)
HGB BLD-MCNC: 15.5 G/DL (ref 12–16)
IRON SATN MFR SERPL: 27 % (ref 15–55)
IRON SERPL-MCNC: 90 UG/DL (ref 40–170)
MCH RBC QN AUTO: 26.4 PG (ref 27–33)
MCHC RBC AUTO-ENTMCNC: 31.6 G/DL (ref 33.6–35)
MCV RBC AUTO: 83.3 FL (ref 81.4–97.8)
PLATELET # BLD AUTO: 419 K/UL (ref 164–446)
PMV BLD AUTO: 9.5 FL (ref 9–12.9)
POTASSIUM SERPL-SCNC: 4.2 MMOL/L (ref 3.6–5.5)
PROT SERPL-MCNC: 7.5 G/DL (ref 6–8.2)
RBC # BLD AUTO: 5.88 M/UL (ref 4.2–5.4)
SODIUM SERPL-SCNC: 146 MMOL/L (ref 135–145)
T4 FREE SERPL-MCNC: 1.51 NG/DL (ref 0.93–1.7)
T4 FREE SERPL-MCNC: 1.59 NG/DL (ref 0.93–1.7)
TIBC SERPL-MCNC: 331 UG/DL (ref 250–450)
TSH SERPL DL<=0.005 MIU/L-ACNC: 3.33 UIU/ML (ref 0.38–5.33)
TSH SERPL DL<=0.005 MIU/L-ACNC: 3.52 UIU/ML (ref 0.38–5.33)
UIBC SERPL-MCNC: 241 UG/DL (ref 110–370)
WBC # BLD AUTO: 5.1 K/UL (ref 4.8–10.8)

## 2023-01-03 PROCEDURE — 84443 ASSAY THYROID STIM HORMONE: CPT | Mod: 91

## 2023-01-03 PROCEDURE — 84443 ASSAY THYROID STIM HORMONE: CPT

## 2023-01-03 PROCEDURE — 84479 ASSAY OF THYROID (T3 OR T4): CPT

## 2023-01-03 PROCEDURE — 85027 COMPLETE CBC AUTOMATED: CPT

## 2023-01-03 PROCEDURE — 82728 ASSAY OF FERRITIN: CPT

## 2023-01-03 PROCEDURE — 83540 ASSAY OF IRON: CPT

## 2023-01-03 PROCEDURE — 84439 ASSAY OF FREE THYROXINE: CPT | Mod: 91

## 2023-01-03 PROCEDURE — 84436 ASSAY OF TOTAL THYROXINE: CPT

## 2023-01-03 PROCEDURE — 83550 IRON BINDING TEST: CPT

## 2023-01-03 PROCEDURE — 80053 COMPREHEN METABOLIC PANEL: CPT

## 2023-01-03 PROCEDURE — 36415 COLL VENOUS BLD VENIPUNCTURE: CPT

## 2023-01-03 PROCEDURE — 84439 ASSAY OF FREE THYROXINE: CPT

## 2023-01-05 LAB
FT4I SERPL CALC-MCNC: 3.3 UNITS (ref 1.7–4.2)
T3RU NFR SERPL: 36 % (ref 28–41)
T4 SERPL-MCNC: 9.13 UG/DL (ref 4.5–11.7)

## 2023-01-09 ENCOUNTER — PROCEDURE VISIT (OUTPATIENT)
Dept: ENDOCRINOLOGY | Facility: MEDICAL CENTER | Age: 28
End: 2023-01-09
Attending: INTERNAL MEDICINE
Payer: COMMERCIAL

## 2023-01-09 DIAGNOSIS — E03.9 PRIMARY HYPOTHYROIDISM: ICD-10-CM

## 2023-01-09 DIAGNOSIS — E06.3 HASHIMOTO'S DISEASE: ICD-10-CM

## 2023-01-09 DIAGNOSIS — E55.9 VITAMIN D DEFICIENCY: ICD-10-CM

## 2023-01-09 PROCEDURE — 76536 US EXAM OF HEAD AND NECK: CPT | Performed by: INTERNAL MEDICINE

## 2023-01-09 NOTE — PROGRESS NOTES
Thyroid US done on this procedure visit  Please see dictated POC US report completed by endocrinologist  Patient advised to follow up in 6 mos as planned with labs prior    Shadi Raymond M.D.

## 2023-02-03 ENCOUNTER — TELEPHONE (OUTPATIENT)
Dept: NEUROLOGY | Facility: MEDICAL CENTER | Age: 28
End: 2023-02-03
Payer: COMMERCIAL

## 2023-02-03 NOTE — TELEPHONE ENCOUNTER

## 2023-02-15 ENCOUNTER — TELEPHONE (OUTPATIENT)
Dept: HEALTH INFORMATION MANAGEMENT | Facility: OTHER | Age: 28
End: 2023-02-15

## 2023-02-16 ENCOUNTER — TELEPHONE (OUTPATIENT)
Dept: HEALTH INFORMATION MANAGEMENT | Facility: OTHER | Age: 28
End: 2023-02-16
Payer: COMMERCIAL

## 2023-02-28 ENCOUNTER — OFFICE VISIT (OUTPATIENT)
Dept: NEUROLOGY | Facility: MEDICAL CENTER | Age: 28
End: 2023-02-28
Attending: PSYCHIATRY & NEUROLOGY
Payer: COMMERCIAL

## 2023-02-28 VITALS
SYSTOLIC BLOOD PRESSURE: 112 MMHG | HEART RATE: 76 BPM | TEMPERATURE: 97.4 F | DIASTOLIC BLOOD PRESSURE: 66 MMHG | HEIGHT: 70 IN | WEIGHT: 173.06 LBS | BODY MASS INDEX: 24.78 KG/M2 | RESPIRATION RATE: 15 BRPM | OXYGEN SATURATION: 99 %

## 2023-02-28 DIAGNOSIS — H49.9 OPHTHALMOPLEGIA: ICD-10-CM

## 2023-02-28 PROCEDURE — 99211 OFF/OP EST MAY X REQ PHY/QHP: CPT | Performed by: PSYCHIATRY & NEUROLOGY

## 2023-02-28 PROCEDURE — 99214 OFFICE O/P EST MOD 30 MIN: CPT | Performed by: PSYCHIATRY & NEUROLOGY

## 2023-02-28 ASSESSMENT — FIBROSIS 4 INDEX: FIB4 SCORE: 0.37

## 2023-02-28 ASSESSMENT — PATIENT HEALTH QUESTIONNAIRE - PHQ9: CLINICAL INTERPRETATION OF PHQ2 SCORE: 0

## 2023-02-28 NOTE — PROGRESS NOTES
"[facility] NEUROLOGY  MULTIPLE SCLEROSIS & NEUROIMMUNOLOGY  FOLLOW-UP VISIT    DISEASE SUMMARY:  Principal neurologic diagnosis: n/a  Diagnosis of MS: n/a  Disease History:  - 1/2020: woke up with weakness involving the upper>lower extremities, worsened over several days, developed double vision, constipation, difficulty emptying bladder, and UTI  - 2/2020: symptoms resolved completely  - 9/2020: return of double vision, onset of bilateral ptosis, return of mild weakness in the upper>lower extremities, no additional workup  - 1/2021: symptoms resolved completely (recovery took longer after this episode than the first)  - 10/2021: became pregnant  - 6/29/2022: delivered first child (boy); pregnancy complicated by pre-eclampsia  - 9/2022: recurrence of ptosis, worse than previous; recurrence of double vision; recurrence of bowel/bladder symptoms  - 10/2022: took prednisone 60 mg x3 days, stopped because of effects on milk supply, no noticeable benefit  - 12/2022: the double vision seemed improved  - 1/2023: the double vision seemed to worsen somewhat  Disease course at onset: n/a  Current disease course: n/a  Previous disease therapies:  - none  Current disease therapies:  - none  Symptomatic therapies:  - none  CSF (1/11/2020):  - OCBs: positive (4)  Other Testing:  - anti-AQP4 Ab (12/26/2020): <1.5  - anti-AChR Abs (10/7/2022): negative  - anti-GD1a Ab (10/3/2022): 199 (elevated)  MRI head:  - 9/15/2022: stable meningioma in the left parasagittal parietal region  - 1/10/2020: pre report, \"25 mm calcific/ossific lesion at the left frontal vertex consistent with an entirely calcified meningioma or inner table osteoma\"  MRI cervical spine:  - 1/10/2020: WNL  MRI thoracic spine:  - 1/10/2020: WNL  Immunizations:  - influenza?:   - Pneumonia?:  - SARS-CoV-2?:   Cancer Screens:  - mammogram:   - PAP?:   - skin check:     CC: 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  I last saw her in the [facility] on 11/4/2022.  Afterward, I discussed the case further with Dr. Leigh.  Today, she was unaccompanied, and she provided the following interval history:    Since the last visit Mercy has noticed some improvement in the bilateral ptosis.    She continues to experience double vision.  This seems triggered by looking at objects in the distance even over short periods of time.    There is also a feeling of \"off balance.\"  Sometimes while walking she feels \"drunk.\"  This has occurred fairly consistently over the past ~3 weeks.    MEDICATIONS:  Current Outpatient Medications   Medication Sig    levothyroxine (SYNTHROID) 50 MCG Tab TAKE 1 TABLET BY MOUTH EVERY DAY IN THE MORNING ON AN EMPTY STOMACH     MEDICAL, SOCIAL, AND FAMILY HISTORY:  There is no change in the patient's ROS or medical, social, or family histories since the previous visit on 11/4/2022.    REVIEW OF SYSTEMS:  A ROS was completed.  Pertinent positives and negatives were included in the HPI, above.  All other systems were reviewed and are negative.    PHYSICAL EXAM:  General/Medical:  - NAD  - hair, skin, nails, and joints were normal    Neuro:  MENTAL STATUS: awake and alert; no deficits of speech or language; oriented to conversation; affect was appropriate to situation; tearful at times    CRANIAL NERVES:    II: acuity: J1+/J1+ without glasses, fields: intact to confrontation, pupils: NT, discs: sharp, no red desaturation noted    III/IV/VI: versions: intact without nystagmus; subjective double vision with nearly extreme left lateral gaze    V: facial sensation: symmetric to light touch    VII: facial expression: symmetric    VIII: hearing: intact to finger rub    IX/X: palate: elevates symmetrically    XI: shoulder shrug: symmetric    XII: tongue: midline    MOTOR:  - bulk: normal " "throughout  - tone: NT  Upper Extremity Strength  (R/L)    5/5   Elbow flexion 5/5   Elbow extension 5/5   Shoulder abduction 5/5     Lower Extremity Strength  (R/L)   Hip flexion 5/5   Knee extension 5/5   Knee flexion NT   Ankle plantarflexion NT   Ankle dorsiflexion NT     - pronator drift: NT  - abnormal movements: none    SENSATION:  - light touch: grossly intact over the upper- and lower extremities  - vibration (R/L, seconds): NT/NT at the great toes  - pinprick: NT  - proprioception: NT  - Romberg: absent    COORDINATION:  - finger to nose: normal, no ataxia on exam  - finger tapping: rapid and accurate, bilaterally    REFLEXES:  Reflex Right Left   BR 2+ 2+   Biceps 2+ 2+   Triceps 2+ 2+   Patellae 2+ 2+   Achilles NT NT   Toes NT NT     GAIT:  - narrow base and normal  - heel-raised/toe-raised gait: NT/NT  - tandem gait: NT    REVIEW OF IMAGING STUDIES:  No additional data since the last visit.    REVIEW OF LABORATORY STUDIES:  No additional data since the last visit.    ASSESSMENT:  Mercy OSULLIVAN is a 27 y.o. 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  Mercy's presentation is difficult to characterize but could be consistent with a mild MFS.  She has fairly mild, persistent visual symptoms including diplopia with distance vision.  I don't think there is a role for IVIg or plasma exchange at this point.  She will alert the clinic if she develops new/worsened symptoms.  We will follow up on an as-needed basis.    PLAN:  MFS:  - no additional workup at this time  - alert the clinic if symptoms worsen    Follow-Up:  - Return if symptoms worsen or fail to improve.    Signed: Heriberto Coelho M.D.    BILLING DOCUMENTATION:   I spent 36 minutes reviewing the medical record, interviewing and examining the patient, discussing my impression (see \"assessment\" above), and coordinating care.  "

## 2023-04-10 ENCOUNTER — OFFICE VISIT (OUTPATIENT)
Dept: OPHTHALMOLOGY | Facility: MEDICAL CENTER | Age: 28
End: 2023-04-10
Payer: COMMERCIAL

## 2023-04-10 DIAGNOSIS — H49.9 OPHTHALMOPLEGIA: ICD-10-CM

## 2023-04-10 PROCEDURE — 92060 SENSORIMOTOR EXAMINATION: CPT | Performed by: OPHTHALMOLOGY

## 2023-04-10 PROCEDURE — 99213 OFFICE O/P EST LOW 20 MIN: CPT | Performed by: OPHTHALMOLOGY

## 2023-04-10 ASSESSMENT — REFRACTION_FINALRX
OD_VPRISM: 2.0
OS_VPRISM: 2.0

## 2023-04-10 ASSESSMENT — REFRACTION_MANIFEST
OS_CYLINDER: +0.25
OD_AXIS: 064
OS_AXIS: 101
OD_CYLINDER: +0.25
OS_SPHERE: +0.00
OD_SPHERE: +0.00

## 2023-04-10 ASSESSMENT — CONF VISUAL FIELD
OD_INFERIOR_NASAL_RESTRICTION: 0
OS_SUPERIOR_NASAL_RESTRICTION: 0
OD_SUPERIOR_TEMPORAL_RESTRICTION: 0
OS_INFERIOR_NASAL_RESTRICTION: 0
OS_SUPERIOR_TEMPORAL_RESTRICTION: 0
OS_NORMAL: 1
OD_NORMAL: 1
OS_INFERIOR_TEMPORAL_RESTRICTION: 0
OD_INFERIOR_TEMPORAL_RESTRICTION: 0
OD_SUPERIOR_NASAL_RESTRICTION: 0

## 2023-04-10 ASSESSMENT — VISUAL ACUITY
OS_SC: 20/25
OS_SC+: +3
OD_SC: 20/20
METHOD: SNELLEN - LINEAR

## 2023-04-10 ASSESSMENT — EXTERNAL EXAM - RIGHT EYE: OD_EXAM: NORMAL

## 2023-04-10 ASSESSMENT — ENCOUNTER SYMPTOMS: DOUBLE VISION: 1

## 2023-04-10 ASSESSMENT — CUP TO DISC RATIO
OD_RATIO: 0.2
OS_RATIO: 0.2

## 2023-04-10 ASSESSMENT — TONOMETRY
OD_IOP_MMHG: 26
IOP_METHOD: I-CARE
OS_IOP_MMHG: 25

## 2023-04-10 ASSESSMENT — SLIT LAMP EXAM - LIDS
COMMENTS: 1+ PTOSIS
COMMENTS: 1+ PTOSIS

## 2023-04-10 ASSESSMENT — EXTERNAL EXAM - LEFT EYE: OS_EXAM: NORMAL

## 2023-04-10 NOTE — ASSESSMENT & PLAN NOTE
9/20/2022-very complex patient with a history of admission to [facility] in 2020 for a undiagnosed generalized peripheral weakness with subsequent bilateral ptosis and double vision.  She had an extensive work-up at that time that revealed a meningioma which was probably noncontributory.  She had acetylcholine receptor antibodies that were negative however antimicrosomal antibody was elevated.  She did undergo a lumbar puncture test that was unremarkable although she did have positive oligoclonal bands.  She states that she was discharged home without treatment with improvement overall.  She has been doing well until 1 week ago when she had return of her ocular symptoms having bilateral ptosis and double vision.  She was seen by neurology at [place] who was uncertain of the diagnosis and referred her to ophthalmology.  She was then seen by optometry in [facility].  She then presented to the emergency room where the meningioma was unchanged.  On examination today she has bilateral ptosis with weakness of orbicularis strength.  She also has some slight upbeat nystagmus and elevation deficit of both eyes worse on the right than the left which is leading to a left hypertropia.  She has saccadic smooth pursuit movements and some fatigue.  Therefore it appears that she has had recurrence of her symptoms from back in 2020.  High in the differential diagnosis would be myasthenia gravis or some post partum precipitation of a Betts Skelton variant of Guillain-Barré syndrome.  I therefore repeated her acetylcholine receptor antibodies, thyroid antibodies, anti-LISA 1 antibody, and recommended a trial of oral prednisone 60 mg to taper over the next 2 weeks.  She will discuss with pediatrics whether this is OK given that she is best breast-feeding.  Consideration could be for a trial of IVIG.  10/4/2020-stop taking oral prednisone after 2 days because she felt it was interfering with her milk production.  However  overall she feels like her ptosis has improved as well as her amount of double vision.  On examination today therapist has been in improvement in her orbicularis strength.  She still has a mild elevation deficit of the left eye.  She also has intermittent hypertropia.  Therefore since it has not progressed and she feels as though it is improved slightly we will continue to monitor.  There are rare case reports of Betts Skelton variant of Guillain-Barré having an exacerbation after pregnancy.  However she just had her myasthenia gravis testing yesterday so will await results.  10/12/2022 - Review of labs TSI, myasthenia panel, anti smooth muscle abs negative. However GD1a ab elevated at 199 which is associated with different variants of GBS  11/28/2022 -essentially resolved extraocular motility elevation deficit.  Still with mild ptosis.  Discussed at length Guillain-Barré syndrome and Betts Skelton variant.  Since overall improving we will continue to monitor  4/10/2023 - Occasional double vision. Has a residual 4 left intermittent hypertropia. Did well in PAT so gave rx with 2 base up OD and 2 base down OS

## 2023-04-10 NOTE — PROGRESS NOTES
Peds/Neuro Ophthalmology:   Ke Leigh M.D.    Date & Time note created:    4/10/2023   4:27 PM     Referring MD / APRN:  LISANDRO Chavarria, No att. providers found    Patient ID:  Name:             Mercy Copeland   YOB: 1995  Age:                 27 y.o.  female   MRN:               [id number]    Chief Complaint/Reason for Visit:     Other (4 month f.v. Ophthalmoplegia OU)      History of Present Illness:    Mercy OSULLIVAN is a 27 y.o. female   4 month f.v. for Ophthalmoplegia OU. Pt states vision is good. She is experiencing double vision from time to time. no pain or discomfort OU. Pt states she isn't experiencing headaches.      Review of Systems:  Review of Systems   Eyes:  Positive for double vision.        Ophthalmoplegia OU   All other systems reviewed and are negative.    Past Medical History:   Past Medical History:   Diagnosis Date    Preeclampsia     Thyroid disease        Past Surgical History:  Past Surgical History:   Procedure Laterality Date    PRIMARY C SECTION N/A 2022    Procedure:  SECTION, PRIMARY;  Surgeon: Araceli Cohen M.D.;  Location[facility];  Service: Labor and Delivery       Current Outpatient Medications:  Current Outpatient Medications   Medication Sig Dispense Refill    levothyroxine (SYNTHROID) 50 MCG Tab TAKE 1 TABLET BY MOUTH EVERY DAY IN THE MORNING ON AN EMPTY STOMACH 30 Tablet 2     No current facility-administered medications for this visit.       Allergies:  No Known Allergies    Family History:  Family History   Problem Relation Age of Onset    Cancer Maternal Aunt        Social History:  Social History     Socioeconomic History    Marital status:      Spouse name: Not on file    Number of children: Not on file    Years of education: Not on file    Highest education level: Not on file   Occupational History    Not on file   Tobacco Use    Smoking status:  Never    Smokeless tobacco: Never   Vaping Use    Vaping Use: Former    Quit date: 1/1/2021   Substance and Sexual Activity    Alcohol use: No    Drug use: No    Sexual activity: Yes     Partners: Male   Other Topics Concern    Not on file   Social History Narrative    Working- case manger     Social Determinants of Health     Financial Resource Strain: Not on file   Food Insecurity: Not on file   Transportation Needs: Not on file   Physical Activity: Not on file   Stress: Not on file   Social Connections: Not on file   Intimate Partner Violence: Not on file   Housing Stability: Not on file          Physical Exam:  Physical Exam    Oriented x 3  Weight/BMI: There is no height or weight on file to calculate BMI.  There were no vitals taken for this visit.    Base Eye Exam       Visual Acuity (Snellen - Linear)         Right Left    Dist sc 20/20 20/25 +3              Tonometry (i-care, 2:25 PM)         Right Left    Pressure 26 25              Pupils         Pupils    Right PERRL    Left PERRL              Visual Fields         Right Left     Full Full              Neuro/Psych       Oriented x3: Yes    Mood/Affect: Normal                  Additional Tests       Color         Right Left    [place] 12/12 12/12                  Strabismus Exam       Correction: sc      Distance Near Near +3DS N Bifocals                      0 0 0   0 0 0                      LH(T) 4 0  0  LH(T) 4 0  0  LH(T) 4     E(T) 2     E(T) 2     E(T) 2      0 0 0   0 0 0                       Slit Lamp and Fundus Exam       External Exam         Right Left    External Normal Normal              Slit Lamp Exam         Right Left    Lids/Lashes 1+ Ptosis 1+ Ptosis    Conjunctiva/Sclera White and quiet White and quiet    Cornea Clear Clear    Anterior Chamber Deep and quiet Deep and quiet    Iris Round and reactive Round and reactive    Lens Clear Clear    Vitreous Normal Normal              Fundus Exam         Right Left    Disc Normal Normal     C/D Ratio 0.2 0.2    Macula Normal Normal    Vessels Normal Normal    Periphery Normal Normal                  Refraction       Manifest Refraction         Sphere Cylinder Axis    Right +0.00 +0.25 064    Left +0.00 +0.25 101              Final Rx         Sphere Cylinder Axis Vert Prism    Right [place] +0.25 065 2.0 up    Left [place] +0.25 100 2.0 down                    Pertinent Lab/Test/Imaging Review:      Assessment and Plan:     Ophthalmoplegia  9/20/2022-very complex patient with a history of admission to [facility] in 2020 for a undiagnosed generalized peripheral weakness with subsequent bilateral ptosis and double vision.  She had an extensive work-up at that time that revealed a meningioma which was probably noncontributory.  She had acetylcholine receptor antibodies that were negative however antimicrosomal antibody was elevated.  She did undergo a lumbar puncture test that was unremarkable although she did have positive oligoclonal bands.  She states that she was discharged home without treatment with improvement overall.  She has been doing well until 1 week ago when she had return of her ocular symptoms having bilateral ptosis and double vision.  She was seen by neurology at [place] who was uncertain of the diagnosis and referred her to ophthalmology.  She was then seen by optometry in [facility].  She then presented to the emergency room where the meningioma was unchanged.  On examination today she has bilateral ptosis with weakness of orbicularis strength.  She also has some slight upbeat nystagmus and elevation deficit of both eyes worse on the right than the left which is leading to a left hypertropia.  She has saccadic smooth pursuit movements and some fatigue.  Therefore it appears that she has had recurrence of her symptoms from [facility] in 2020.  High in the differential diagnosis would be myasthenia gravis or some post partum precipitation of a [place] variant of Guillain-Barré  syndrome.  I therefore repeated her acetylcholine receptor antibodies, thyroid antibodies, anti-LISA 1 antibody, and recommended a trial of oral prednisone 60 mg to taper over the next 2 weeks.  She will discuss with pediatrics whether this is OK given that she is best breast-feeding.  Consideration could be for a trial of IVIG.  10/4/2020-stop taking oral prednisone after 2 days because she felt it was interfering with her milk production.  However overall she feels like her ptosis has improved as well as her amount of double vision.  On examination today therapist has been in improvement in her orbicularis strength.  She still has a mild elevation deficit of the left eye.  She also has intermittent hypertropia.  Therefore since it has not progressed and she feels as though it is improved slightly we will continue to monitor.  There are rare case reports of [place] variant of Guillain-Barré having an exacerbation after pregnancy.  However she just had her myasthenia gravis testing yesterday so will await results.  10/12/2022 - Review of labs TSI, myasthenia panel, anti smooth muscle abs negative. However GD1a ab elevated at 199 which is associated with different variants of GBS  11/28/2022 -essentially resolved extraocular motility elevation deficit.  Still with mild ptosis.  Discussed at length Guillain-Barré syndrome and [place] variant.  Since overall improving we will continue to monitor  4/10/2023 - Occasional double vision. Has a residual 4 left intermittent hypertropia. Did well in PAT so gave rx with 2 base up OD and 2 base down OS        Ke Leigh M.D.

## 2023-04-25 ENCOUNTER — DOCUMENTATION (OUTPATIENT)
Dept: OPHTHALMOLOGY | Facility: MEDICAL CENTER | Age: 28
End: 2023-04-25
Payer: COMMERCIAL

## 2023-04-25 ASSESSMENT — REFRACTION_WEARINGRX
OD_VPRISM: 2.0
OS_AXIS: 100
OD_CYLINDER: +0.25
OS_VBASE: DOWN
OS_VPRISM: 2.0
OD_SPHERE: PLANO
OD_VBASE: UP
OS_SPHERE: PLANO
OS_CYLINDER: +0.25
OD_AXIS: 065

## 2023-04-25 ASSESSMENT — REFRACTION_FINALRX
OD_VPRISM: 2.0
OS_VPRISM: 2.0
OS_HPRISM: 1.0
OD_HPRISM: 1.0
OD_HBASE: OUT
OS_HBASE: OUT

## 2023-05-30 ENCOUNTER — OFFICE VISIT (OUTPATIENT)
Dept: URGENT CARE | Facility: CLINIC | Age: 28
End: 2023-05-30
Payer: COMMERCIAL

## 2023-05-30 VITALS
RESPIRATION RATE: 16 BRPM | OXYGEN SATURATION: 94 % | HEART RATE: 92 BPM | WEIGHT: 185 LBS | HEIGHT: 70 IN | DIASTOLIC BLOOD PRESSURE: 68 MMHG | TEMPERATURE: 98.2 F | SYSTOLIC BLOOD PRESSURE: 102 MMHG | BODY MASS INDEX: 26.48 KG/M2

## 2023-05-30 DIAGNOSIS — R05.1 ACUTE COUGH: ICD-10-CM

## 2023-05-30 DIAGNOSIS — J02.9 PHARYNGITIS, UNSPECIFIED ETIOLOGY: ICD-10-CM

## 2023-05-30 DIAGNOSIS — J04.0 LARYNGITIS: ICD-10-CM

## 2023-05-30 LAB — S PYO DNA SPEC NAA+PROBE: NOT DETECTED

## 2023-05-30 PROCEDURE — 3074F SYST BP LT 130 MM HG: CPT

## 2023-05-30 PROCEDURE — 87651 STREP A DNA AMP PROBE: CPT

## 2023-05-30 PROCEDURE — 3078F DIAST BP <80 MM HG: CPT

## 2023-05-30 PROCEDURE — 99214 OFFICE O/P EST MOD 30 MIN: CPT

## 2023-05-30 RX ORDER — METHYLPREDNISOLONE 4 MG/1
TABLET ORAL
Qty: 21 TABLET | Refills: 0 | Status: SHIPPED | OUTPATIENT
Start: 2023-05-30

## 2023-05-30 RX ORDER — BENZONATATE 100 MG/1
100 CAPSULE ORAL 3 TIMES DAILY PRN
Qty: 30 CAPSULE | Refills: 0 | Status: SHIPPED | OUTPATIENT
Start: 2023-05-30 | End: 2023-06-09

## 2023-05-30 ASSESSMENT — ENCOUNTER SYMPTOMS
SINUS PAIN: 0
STRIDOR: 0
WHEEZING: 0
SHORTNESS OF BREATH: 0
TROUBLE SWALLOWING: 0
HEADACHES: 0
VOMITING: 0
COUGH: 1
SORE THROAT: 1
NECK PAIN: 0
NAUSEA: 0
FEVER: 0
HOARSE VOICE: 1
DIARRHEA: 0
SWOLLEN GLANDS: 0
SPUTUM PRODUCTION: 0
ABDOMINAL PAIN: 0
DIAPHORESIS: 0
WEIGHT LOSS: 0
CHILLS: 0
HEMOPTYSIS: 0

## 2023-05-30 ASSESSMENT — FIBROSIS 4 INDEX: FIB4 SCORE: 0.38

## 2023-05-30 NOTE — PROGRESS NOTES
Subjective:     Mercy OSULLIVAN is a 28 y.o. female who presents for Pharyngitis (W/Cough x3 days)      Pharyngitis   This is a new problem. Episode onset: four days ago. Neither side of throat is experiencing more pain than the other. There has been no fever. Associated symptoms include coughing and a hoarse voice. Pertinent negatives include no abdominal pain, congestion, diarrhea, drooling, ear discharge, ear pain, headaches, plugged ear sensation, neck pain, shortness of breath, stridor, swollen glands, trouble swallowing or vomiting. Associated symptoms comments: Pain with swallowing, tonsil swelling . She has had no exposure to strep or mono. She has tried NSAIDs, acetaminophen and gargles (tea) for the symptoms. The treatment provided mild relief.     Review of Systems   Constitutional:  Negative for chills, diaphoresis, fever, malaise/fatigue and weight loss.   HENT:  Positive for hoarse voice and sore throat. Negative for congestion, drooling, ear discharge, ear pain, sinus pain and trouble swallowing.    Respiratory:  Positive for cough. Negative for hemoptysis, sputum production, shortness of breath, wheezing and stridor.    Cardiovascular:  Negative for chest pain.   Gastrointestinal:  Negative for abdominal pain, diarrhea, nausea and vomiting.   Musculoskeletal:  Negative for neck pain.   Neurological:  Negative for headaches.   All other systems reviewed and are negative.      PMH:   Past Medical History:   Diagnosis Date    Preeclampsia     Thyroid disease      ALLERGIES: No Known Allergies  SURGHX:   Past Surgical History:   Procedure Laterality Date    PRIMARY C SECTION N/A 2022    Procedure:  SECTION, PRIMARY;  Surgeon: Araceli Cohen M.D.;  Location: [facility];  Service: Labor and Delivery     SOCHX:   Social History     Socioeconomic History    Marital status:    Tobacco Use    Smoking status: Never    Smokeless tobacco: Never  "  Vaping Use    Vaping Use: Former    Quit date: 1/1/2021   Substance and Sexual Activity    Alcohol use: No    Drug use: No    Sexual activity: Yes     Partners: Male   Social History Narrative    Working- case manger     FH:   Family History   Problem Relation Age of Onset    Cancer Maternal Aunt          Objective:   /68 (BP Location: Left arm, Patient Position: Sitting, BP Cuff Size: Adult)   Pulse 92   Temp 36.8 °C (98.2 °F) (Temporal)   Resp 16   Ht 1.778 m (5' 10\")   Wt 83.9 kg (185 lb)   SpO2 94%   BMI 26.54 kg/m²     Physical Exam  Vitals reviewed.   Constitutional:       General: She is not in acute distress.     Appearance: Normal appearance. She is not ill-appearing or toxic-appearing.   HENT:      Head: Normocephalic and atraumatic.      Right Ear: Tympanic membrane, ear canal and external ear normal.      Left Ear: Tympanic membrane, ear canal and external ear normal.      Nose: Nose normal.      Mouth/Throat:      Lips: Pink.      Mouth: Mucous membranes are moist.      Tongue: No lesions. Tongue does not deviate from midline.      Palate: No mass and lesions.      Pharynx: Oropharynx is clear. Uvula midline. Posterior oropharyngeal erythema present. No oropharyngeal exudate.      Tonsils: No tonsillar exudate or tonsillar abscesses. 2+ on the right. 2+ on the left.   Eyes:      Extraocular Movements: Extraocular movements intact.      Conjunctiva/sclera: Conjunctivae normal.      Pupils: Pupils are equal, round, and reactive to light.   Neck:      Trachea: Trachea and phonation normal.   Cardiovascular:      Rate and Rhythm: Normal rate and regular rhythm.   Pulmonary:      Effort: Pulmonary effort is normal. No respiratory distress.      Breath sounds: Normal breath sounds. No wheezing, rhonchi or rales.   Chest:      Chest wall: No tenderness.   Musculoskeletal:         General: Normal range of motion.      Cervical back: Full passive range of motion without pain, normal range of " motion and neck supple. Normal range of motion.   Lymphadenopathy:      Cervical: No cervical adenopathy.      Right cervical: No superficial, deep or posterior cervical adenopathy.     Left cervical: No superficial, deep or posterior cervical adenopathy.   Skin:     General: Skin is warm and dry.   Neurological:      Mental Status: She is alert.   Psychiatric:         Mood and Affect: Mood normal.         Behavior: Behavior normal.         Thought Content: Thought content normal.       Results for orders placed or performed in visit on 05/30/23   POCT GROUP A STREP, PCR   Result Value Ref Range    POC Group A Strep, PCR Not Detected Not Detected, Invalid       Assessment/Plan:   Assessment      1. Pharyngitis, unspecified etiology  - POCT GROUP A STREP, PCR    2. Laryngitis  - methylPREDNISolone (MEDROL DOSEPAK) 4 MG Tablet Therapy Pack; Follow schedule on package instructions.  Dispense: 21 Tablet; Refill: 0    3. Acute cough  - benzonatate (TESSALON) 100 MG Cap; Take 1 Capsule by mouth 3 times a day as needed for Cough for up to 10 days.  Dispense: 30 Capsule; Refill: 0     Based on patient's symptoms, symptom duration, and physical exam findings, it was discussed with patient that the likely etiology of the illness is viral.  Patient has significant laryngitis/hoarseness in clinic which is prohibiting her from speaking.  During clinical assessment, her tonsils were swollen and are 2+ without exudate or visible abscess. She is not having any difficulty swallowing or breathing.   Prescription for Medrol Dosepak, benzonatate sent to patient's preferred pharmacy.  Supportive care discussed with patient. All questions answered. Patient verbalized understanding and is in agreement with this plan of care.     If symptoms are worsening or not improving in 3-5 days, follow-up with PCP or return to . Differential diagnosis, natural history, and supportive care discussed. AVS handout given and reviewed with patient.  Patient educated on red flags and when to seek treatment back in ED or UC.     I personally reviewed prior external notes and test results pertinent to today's visit.  I have independently reviewed and interpreted all diagnostics ordered during this urgent care visit.     This dictation has been created using voice recognition software. The accuracy of the dictation is limited by the abilities of the software. I expect there may be some errors of grammar and possibly content. I made every attempt to manually correct the errors within my dictation. However, errors related to voice recognition software may still exist and should be interpreted within the appropriate context.    This note was electronically signed by LISANDRO Motta

## 2023-05-30 NOTE — LETTER
May 30, 2023    To Whom It May Concern:         This is confirmation that Mercy OSULLIVAN attended her scheduled appointment with LISANDRO Mercer on 5/30/23.  Please excuse her from work on 5/30/23 due to an acute illness.  She will be able to return to work on 5/31/2023.         If you have any questions please do not hesitate to call me at the phone number listed below.    Sincerely,          MARLYS MercerRChinoN.  [phone]

## 2023-06-20 ENCOUNTER — TELEPHONE (OUTPATIENT)
Dept: ENDOCRINOLOGY | Facility: MEDICAL CENTER | Age: 28
End: 2023-06-20
Payer: COMMERCIAL

## 2023-06-27 ENCOUNTER — TELEPHONE (OUTPATIENT)
Dept: ENDOCRINOLOGY | Facility: MEDICAL CENTER | Age: 28
End: 2023-06-27
Payer: COMMERCIAL

## 2023-06-28 ENCOUNTER — TELEPHONE (OUTPATIENT)
Dept: ENDOCRINOLOGY | Facility: MEDICAL CENTER | Age: 28
End: 2023-06-28
Payer: COMMERCIAL

## 2023-06-29 ENCOUNTER — TELEPHONE (OUTPATIENT)
Dept: ENDOCRINOLOGY | Facility: MEDICAL CENTER | Age: 28
End: 2023-06-29
Payer: COMMERCIAL

## 2023-07-03 ENCOUNTER — TELEPHONE (OUTPATIENT)
Dept: ENDOCRINOLOGY | Facility: MEDICAL CENTER | Age: 28
End: 2023-07-03
Payer: COMMERCIAL

## 2023-12-02 ENCOUNTER — HOSPITAL ENCOUNTER (OUTPATIENT)
Dept: LAB | Facility: MEDICAL CENTER | Age: 28
End: 2023-12-02
Attending: NURSE PRACTITIONER
Payer: COMMERCIAL

## 2023-12-02 LAB
ALBUMIN SERPL BCP-MCNC: 4.4 G/DL (ref 3.2–4.9)
ALBUMIN/GLOB SERPL: 1.5 G/DL
ALP SERPL-CCNC: 87 U/L (ref 30–99)
ALT SERPL-CCNC: 13 U/L (ref 2–50)
ANION GAP SERPL CALC-SCNC: 9 MMOL/L (ref 7–16)
AST SERPL-CCNC: 13 U/L (ref 12–45)
BILIRUB SERPL-MCNC: 0.6 MG/DL (ref 0.1–1.5)
BUN SERPL-MCNC: 20 MG/DL (ref 8–22)
CALCIUM ALBUM COR SERPL-MCNC: 9.5 MG/DL (ref 8.5–10.5)
CALCIUM SERPL-MCNC: 9.8 MG/DL (ref 8.5–10.5)
CHLORIDE SERPL-SCNC: 106 MMOL/L (ref 96–112)
CO2 SERPL-SCNC: 27 MMOL/L (ref 20–33)
CREAT SERPL-MCNC: 1.04 MG/DL (ref 0.5–1.4)
ERYTHROCYTE [DISTWIDTH] IN BLOOD BY AUTOMATED COUNT: 42.9 FL (ref 35.9–50)
FASTING STATUS PATIENT QL REPORTED: NORMAL
FOLATE SERPL-MCNC: 11.2 NG/ML
GFR SERPLBLD CREATININE-BSD FMLA CKD-EPI: 74 ML/MIN/1.73 M 2
GLOBULIN SER CALC-MCNC: 2.9 G/DL (ref 1.9–3.5)
GLUCOSE SERPL-MCNC: 89 MG/DL (ref 65–99)
HCT VFR BLD AUTO: 48.8 % (ref 37–47)
HGB BLD-MCNC: 15.9 G/DL (ref 12–16)
MCH RBC QN AUTO: 26.9 PG (ref 27–33)
MCHC RBC AUTO-ENTMCNC: 32.6 G/DL (ref 32.2–35.5)
MCV RBC AUTO: 82.7 FL (ref 81.4–97.8)
PLATELET # BLD AUTO: 374 K/UL (ref 164–446)
PMV BLD AUTO: 9.3 FL (ref 9–12.9)
POTASSIUM SERPL-SCNC: 4.7 MMOL/L (ref 3.6–5.5)
PROT SERPL-MCNC: 7.3 G/DL (ref 6–8.2)
RBC # BLD AUTO: 5.9 M/UL (ref 4.2–5.4)
SODIUM SERPL-SCNC: 142 MMOL/L (ref 135–145)
T4 FREE SERPL-MCNC: 1.37 NG/DL (ref 0.93–1.7)
TSH SERPL DL<=0.005 MIU/L-ACNC: 1.07 UIU/ML (ref 0.38–5.33)
VIT B12 SERPL-MCNC: 653 PG/ML (ref 211–911)
WBC # BLD AUTO: 5.2 K/UL (ref 4.8–10.8)

## 2023-12-02 PROCEDURE — 82746 ASSAY OF FOLIC ACID SERUM: CPT

## 2023-12-02 PROCEDURE — 84443 ASSAY THYROID STIM HORMONE: CPT

## 2023-12-02 PROCEDURE — 82607 VITAMIN B-12: CPT

## 2023-12-02 PROCEDURE — 36415 COLL VENOUS BLD VENIPUNCTURE: CPT

## 2023-12-02 PROCEDURE — 80053 COMPREHEN METABOLIC PANEL: CPT

## 2023-12-02 PROCEDURE — 85027 COMPLETE CBC AUTOMATED: CPT

## 2023-12-02 PROCEDURE — 84439 ASSAY OF FREE THYROXINE: CPT

## (undated) DEVICE — KIT  I.V. START (100EA/CA)

## (undated) DEVICE — CATHETER IV NON-SAFETY 18 GA X 1 1/4 (50/BX 4BX/CA)

## (undated) DEVICE — GLOVE BIOGEL SZ 6.5 SURGICAL PF LTX (50PR/BX 4BX/CA)

## (undated) DEVICE — DRESSING TEGADERM 8 X 12 - (10/BX 8BX/CA)

## (undated) DEVICE — SUTURE 3-0 VICRYL PLUS CT-1 - 36 INCH (36/BX)

## (undated) DEVICE — DRESSING TRANSPARENT FILM TEGADERM 4 X 4.75" (50EA/BX)"

## (undated) DEVICE — TRAY SPINAL ANESTHESIA NON-SAFETY (10/CA)

## (undated) DEVICE — SUTURE 0 VICRYL PLUS CT-1 - 36 INCH (36/BX)

## (undated) DEVICE — WATER IRRIGATION STERILE 1000ML (12EA/CA)

## (undated) DEVICE — HEAD HOLDER JUNIOR/ADULT

## (undated) DEVICE — ELECTRODE DUAL RETURN W/ CORD - (50/PK)

## (undated) DEVICE — PACK C-SECTION (2EA/CA)

## (undated) DEVICE — CLOSURE PRINEO SKIN - (2EA/BX)

## (undated) DEVICE — SUTURE 1 CHROMIC GUTCT-1 27 (36PK/BX)"

## (undated) DEVICE — SET EXTENSION WITH 2 PORTS (48EA/CA) ***PART #2C8610 IS A SUBSTITUTE*****

## (undated) DEVICE — SODIUM CHL IRRIGATION 0.9% 1000ML (12EA/CA)

## (undated) DEVICE — STAPLER SKIN DISP - (6/BX 10BX/CA) VISISTAT

## (undated) DEVICE — TUBING CLEARLINK DUO-VENT - C-FLO (48EA/CA)